# Patient Record
Sex: MALE | Race: WHITE | NOT HISPANIC OR LATINO | Employment: OTHER | URBAN - METROPOLITAN AREA
[De-identification: names, ages, dates, MRNs, and addresses within clinical notes are randomized per-mention and may not be internally consistent; named-entity substitution may affect disease eponyms.]

---

## 2018-05-17 ENCOUNTER — HOSPITAL ENCOUNTER (INPATIENT)
Facility: HOSPITAL | Age: 73
LOS: 13 days | Discharge: HOME HEALTH | DRG: 871 | End: 2018-05-30
Attending: EMERGENCY MEDICINE | Admitting: INTERNAL MEDICINE
Payer: MEDICARE

## 2018-05-17 DIAGNOSIS — G47.33 OBSTRUCTIVE SLEEP APNEA: ICD-10-CM

## 2018-05-17 DIAGNOSIS — R50.9 FEVER: ICD-10-CM

## 2018-05-17 DIAGNOSIS — R65.20 SEVERE SEPSIS: ICD-10-CM

## 2018-05-17 DIAGNOSIS — Z78.9 ALCOHOL USE: ICD-10-CM

## 2018-05-17 DIAGNOSIS — N17.9 AKI (ACUTE KIDNEY INJURY): ICD-10-CM

## 2018-05-17 DIAGNOSIS — I25.10 CORONARY ARTERY DISEASE, ANGINA PRESENCE UNSPECIFIED, UNSPECIFIED VESSEL OR LESION TYPE, UNSPECIFIED WHETHER NATIVE OR TRANSPLANTED HEART: ICD-10-CM

## 2018-05-17 DIAGNOSIS — J44.0 COPD WITH ACUTE LOWER RESPIRATORY INFECTION: ICD-10-CM

## 2018-05-17 DIAGNOSIS — E11.65 TYPE 2 DIABETES MELLITUS WITH HYPERGLYCEMIA, UNSPECIFIED WHETHER LONG TERM INSULIN USE: ICD-10-CM

## 2018-05-17 DIAGNOSIS — J18.9 COMMUNITY ACQUIRED PNEUMONIA OF LEFT LOWER LOBE OF LUNG: Primary | ICD-10-CM

## 2018-05-17 DIAGNOSIS — J96.01 ACUTE HYPOXEMIC RESPIRATORY FAILURE: ICD-10-CM

## 2018-05-17 DIAGNOSIS — J96.01 ACUTE RESPIRATORY FAILURE WITH HYPOXEMIA: ICD-10-CM

## 2018-05-17 DIAGNOSIS — I48.91 A-FIB: ICD-10-CM

## 2018-05-17 DIAGNOSIS — I10 HYPERTENSION, UNSPECIFIED TYPE: ICD-10-CM

## 2018-05-17 DIAGNOSIS — A41.9 SEVERE SEPSIS: ICD-10-CM

## 2018-05-17 DIAGNOSIS — I48.0 PAROXYSMAL ATRIAL FIBRILLATION: ICD-10-CM

## 2018-05-17 LAB
ALBUMIN SERPL BCP-MCNC: 3.9 G/DL
ALLENS TEST: ABNORMAL
ALP SERPL-CCNC: 42 U/L
ALT SERPL W/O P-5'-P-CCNC: 14 U/L
AMMONIA PLAS-SCNC: 22 UMOL/L
ANION GAP SERPL CALC-SCNC: 12 MMOL/L
AST SERPL-CCNC: 11 U/L
BACTERIA #/AREA URNS HPF: ABNORMAL /HPF
BASOPHILS # BLD AUTO: 0 K/UL
BASOPHILS NFR BLD: 0.1 %
BILIRUB SERPL-MCNC: 0.4 MG/DL
BILIRUB UR QL STRIP: NEGATIVE
BUN SERPL-MCNC: 16 MG/DL
CALCIUM SERPL-MCNC: 9.4 MG/DL
CHLORIDE SERPL-SCNC: 97 MMOL/L
CLARITY UR: CLEAR
CO2 SERPL-SCNC: 23 MMOL/L
COLOR UR: YELLOW
CREAT SERPL-MCNC: 1.4 MG/DL
DIFFERENTIAL METHOD: ABNORMAL
EOSINOPHIL # BLD AUTO: 0 K/UL
EOSINOPHIL NFR BLD: 0 %
ERYTHROCYTE [DISTWIDTH] IN BLOOD BY AUTOMATED COUNT: 14.7 %
EST. GFR  (AFRICAN AMERICAN): 58 ML/MIN/1.73 M^2
EST. GFR  (NON AFRICAN AMERICAN): 51 ML/MIN/1.73 M^2
ETHANOL SERPL-MCNC: <10 MG/DL
GLUCOSE SERPL-MCNC: 196 MG/DL
GLUCOSE UR QL STRIP: ABNORMAL
HCO3 UR-SCNC: 25 MMOL/L (ref 24–28)
HCT VFR BLD AUTO: 35.8 %
HGB BLD-MCNC: 12.2 G/DL
HGB UR QL STRIP: ABNORMAL
HYALINE CASTS #/AREA URNS LPF: 0 /LPF
KETONES UR QL STRIP: NEGATIVE
LACTATE SERPL-SCNC: 2.1 MMOL/L
LACTATE SERPL-SCNC: 2.1 MMOL/L
LACTATE SERPL-SCNC: 2.4 MMOL/L
LEUKOCYTE ESTERASE UR QL STRIP: NEGATIVE
LYMPHOCYTES # BLD AUTO: 0.3 K/UL
LYMPHOCYTES NFR BLD: 4.5 %
MCH RBC QN AUTO: 33.6 PG
MCHC RBC AUTO-ENTMCNC: 34 G/DL
MCV RBC AUTO: 99 FL
MICROSCOPIC COMMENT: ABNORMAL
MONOCYTES # BLD AUTO: 0.5 K/UL
MONOCYTES NFR BLD: 6.5 %
NEUTROPHILS # BLD AUTO: 6.9 K/UL
NEUTROPHILS NFR BLD: 88.9 %
NITRITE UR QL STRIP: NEGATIVE
PCO2 BLDA: 38.8 MMHG (ref 35–45)
PH SMN: 7.42 [PH] (ref 7.35–7.45)
PH UR STRIP: 6 [PH] (ref 5–8)
PLATELET # BLD AUTO: 104 K/UL
PMV BLD AUTO: 9 FL
PO2 BLDA: 29 MMHG (ref 40–60)
POC BE: 0 MMOL/L
POC SATURATED O2: 56 % (ref 95–100)
POC TCO2: 26 MMOL/L (ref 24–29)
POTASSIUM SERPL-SCNC: 4 MMOL/L
PROT SERPL-MCNC: 7.6 G/DL
PROT UR QL STRIP: ABNORMAL
RBC # BLD AUTO: 3.62 M/UL
RBC #/AREA URNS HPF: 8 /HPF (ref 0–4)
SAMPLE: ABNORMAL
SITE: ABNORMAL
SODIUM SERPL-SCNC: 132 MMOL/L
SP GR UR STRIP: 1.02 (ref 1–1.03)
SQUAMOUS #/AREA URNS HPF: 1 /HPF
URN SPEC COLLECT METH UR: ABNORMAL
UROBILINOGEN UR STRIP-ACNC: NEGATIVE EU/DL
WBC # BLD AUTO: 7.8 K/UL
WBC #/AREA URNS HPF: 4 /HPF (ref 0–5)
YEAST URNS QL MICRO: ABNORMAL

## 2018-05-17 PROCEDURE — 99223 1ST HOSP IP/OBS HIGH 75: CPT | Mod: AI,,, | Performed by: INTERNAL MEDICINE

## 2018-05-17 PROCEDURE — 96361 HYDRATE IV INFUSION ADD-ON: CPT

## 2018-05-17 PROCEDURE — 80320 DRUG SCREEN QUANTALCOHOLS: CPT

## 2018-05-17 PROCEDURE — 99285 EMERGENCY DEPT VISIT HI MDM: CPT | Mod: 25

## 2018-05-17 PROCEDURE — 36415 COLL VENOUS BLD VENIPUNCTURE: CPT

## 2018-05-17 PROCEDURE — 84145 PROCALCITONIN (PCT): CPT

## 2018-05-17 PROCEDURE — 83605 ASSAY OF LACTIC ACID: CPT

## 2018-05-17 PROCEDURE — 87186 SC STD MICRODIL/AGAR DIL: CPT

## 2018-05-17 PROCEDURE — 63600175 PHARM REV CODE 636 W HCPCS: Performed by: EMERGENCY MEDICINE

## 2018-05-17 PROCEDURE — 81000 URINALYSIS NONAUTO W/SCOPE: CPT

## 2018-05-17 PROCEDURE — 25000003 PHARM REV CODE 250: Performed by: EMERGENCY MEDICINE

## 2018-05-17 PROCEDURE — 27000221 HC OXYGEN, UP TO 24 HOURS

## 2018-05-17 PROCEDURE — 85025 COMPLETE CBC W/AUTO DIFF WBC: CPT

## 2018-05-17 PROCEDURE — 80053 COMPREHEN METABOLIC PANEL: CPT

## 2018-05-17 PROCEDURE — 87077 CULTURE AEROBIC IDENTIFY: CPT

## 2018-05-17 PROCEDURE — 82803 BLOOD GASES ANY COMBINATION: CPT

## 2018-05-17 PROCEDURE — 82140 ASSAY OF AMMONIA: CPT

## 2018-05-17 PROCEDURE — 12000002 HC ACUTE/MED SURGE SEMI-PRIVATE ROOM

## 2018-05-17 PROCEDURE — 99900035 HC TECH TIME PER 15 MIN (STAT)

## 2018-05-17 PROCEDURE — 87040 BLOOD CULTURE FOR BACTERIA: CPT | Mod: 59

## 2018-05-17 PROCEDURE — 83605 ASSAY OF LACTIC ACID: CPT | Mod: 91

## 2018-05-17 PROCEDURE — 94760 N-INVAS EAR/PLS OXIMETRY 1: CPT

## 2018-05-17 PROCEDURE — 96374 THER/PROPH/DIAG INJ IV PUSH: CPT

## 2018-05-17 RX ORDER — IBUPROFEN 200 MG
16 TABLET ORAL
Status: DISCONTINUED | OUTPATIENT
Start: 2018-05-17 | End: 2018-05-30 | Stop reason: HOSPADM

## 2018-05-17 RX ORDER — BENAZEPRIL HYDROCHLORIDE 10 MG/1
40 TABLET ORAL DAILY
Status: DISCONTINUED | OUTPATIENT
Start: 2018-05-18 | End: 2018-05-30 | Stop reason: HOSPADM

## 2018-05-17 RX ORDER — MONTELUKAST SODIUM 10 MG/1
10 TABLET ORAL NIGHTLY
Status: DISCONTINUED | OUTPATIENT
Start: 2018-05-17 | End: 2018-05-30 | Stop reason: HOSPADM

## 2018-05-17 RX ORDER — AMLODIPINE BESYLATE 5 MG/1
10 TABLET ORAL DAILY
Status: DISCONTINUED | OUTPATIENT
Start: 2018-05-18 | End: 2018-05-30 | Stop reason: HOSPADM

## 2018-05-17 RX ORDER — FUROSEMIDE 40 MG/1
40 TABLET ORAL DAILY
COMMUNITY

## 2018-05-17 RX ORDER — METOPROLOL SUCCINATE 25 MG/1
25 TABLET, EXTENDED RELEASE ORAL DAILY
Status: DISCONTINUED | OUTPATIENT
Start: 2018-05-18 | End: 2018-05-30 | Stop reason: HOSPADM

## 2018-05-17 RX ORDER — CEFTRIAXONE 1 G/50ML
1 INJECTION, SOLUTION INTRAVENOUS
Status: COMPLETED | OUTPATIENT
Start: 2018-05-17 | End: 2018-05-17

## 2018-05-17 RX ORDER — ATORVASTATIN CALCIUM 40 MG/1
40 TABLET, FILM COATED ORAL DAILY
Status: ON HOLD | COMMUNITY
End: 2018-05-30

## 2018-05-17 RX ORDER — GABAPENTIN 300 MG/1
300 CAPSULE ORAL 3 TIMES DAILY
Status: DISCONTINUED | OUTPATIENT
Start: 2018-05-18 | End: 2018-05-30 | Stop reason: HOSPADM

## 2018-05-17 RX ORDER — LORAZEPAM 2 MG/ML
2 INJECTION INTRAMUSCULAR
Status: DISCONTINUED | OUTPATIENT
Start: 2018-05-17 | End: 2018-05-30 | Stop reason: HOSPADM

## 2018-05-17 RX ORDER — INSULIN ASPART 100 [IU]/ML
0-5 INJECTION, SOLUTION INTRAVENOUS; SUBCUTANEOUS
Status: DISCONTINUED | OUTPATIENT
Start: 2018-05-17 | End: 2018-05-30 | Stop reason: HOSPADM

## 2018-05-17 RX ORDER — IBUPROFEN 400 MG/1
400 TABLET ORAL
Status: COMPLETED | OUTPATIENT
Start: 2018-05-17 | End: 2018-05-17

## 2018-05-17 RX ORDER — ATORVASTATIN CALCIUM 40 MG/1
40 TABLET, FILM COATED ORAL DAILY
Status: DISCONTINUED | OUTPATIENT
Start: 2018-05-18 | End: 2018-05-28

## 2018-05-17 RX ORDER — GLUCAGON 1 MG
1 KIT INJECTION
Status: DISCONTINUED | OUTPATIENT
Start: 2018-05-17 | End: 2018-05-30 | Stop reason: HOSPADM

## 2018-05-17 RX ORDER — ENOXAPARIN SODIUM 100 MG/ML
40 INJECTION SUBCUTANEOUS EVERY 24 HOURS
Status: DISCONTINUED | OUTPATIENT
Start: 2018-05-17 | End: 2018-05-21

## 2018-05-17 RX ORDER — AMIODARONE HYDROCHLORIDE 200 MG/1
200 TABLET ORAL DAILY
Status: DISCONTINUED | OUTPATIENT
Start: 2018-05-18 | End: 2018-05-30 | Stop reason: HOSPADM

## 2018-05-17 RX ORDER — ASPIRIN 325 MG
162 TABLET ORAL DAILY
COMMUNITY
End: 2019-03-14 | Stop reason: ALTCHOICE

## 2018-05-17 RX ORDER — THIAMINE HCL 100 MG
100 TABLET ORAL DAILY
Status: DISCONTINUED | OUTPATIENT
Start: 2018-05-18 | End: 2018-05-30 | Stop reason: HOSPADM

## 2018-05-17 RX ORDER — CEFTRIAXONE 2 G/50ML
2 INJECTION, SOLUTION INTRAVENOUS
Status: COMPLETED | OUTPATIENT
Start: 2018-05-18 | End: 2018-05-18

## 2018-05-17 RX ORDER — FUROSEMIDE 40 MG/1
40 TABLET ORAL DAILY
Status: DISCONTINUED | OUTPATIENT
Start: 2018-05-18 | End: 2018-05-18

## 2018-05-17 RX ORDER — METOPROLOL SUCCINATE 25 MG/1
25 TABLET, EXTENDED RELEASE ORAL DAILY
COMMUNITY

## 2018-05-17 RX ORDER — MONTELUKAST SODIUM 10 MG/1
10 TABLET ORAL NIGHTLY
COMMUNITY

## 2018-05-17 RX ORDER — TAMSULOSIN HYDROCHLORIDE 0.4 MG/1
0.4 CAPSULE ORAL DAILY
Status: DISCONTINUED | OUTPATIENT
Start: 2018-05-18 | End: 2018-05-30 | Stop reason: HOSPADM

## 2018-05-17 RX ORDER — IBUPROFEN 200 MG
24 TABLET ORAL
Status: DISCONTINUED | OUTPATIENT
Start: 2018-05-17 | End: 2018-05-30 | Stop reason: HOSPADM

## 2018-05-17 RX ORDER — IPRATROPIUM BROMIDE AND ALBUTEROL SULFATE 2.5; .5 MG/3ML; MG/3ML
3 SOLUTION RESPIRATORY (INHALATION) EVERY 4 HOURS
Status: DISCONTINUED | OUTPATIENT
Start: 2018-05-18 | End: 2018-05-30 | Stop reason: HOSPADM

## 2018-05-17 RX ORDER — GABAPENTIN 300 MG/1
300 CAPSULE ORAL 3 TIMES DAILY
COMMUNITY
End: 2019-01-23

## 2018-05-17 RX ORDER — SODIUM CHLORIDE 9 MG/ML
1000 INJECTION, SOLUTION INTRAVENOUS
Status: COMPLETED | OUTPATIENT
Start: 2018-05-17 | End: 2018-05-17

## 2018-05-17 RX ORDER — ACETAMINOPHEN 325 MG/1
650 TABLET ORAL EVERY 6 HOURS PRN
Status: DISCONTINUED | OUTPATIENT
Start: 2018-05-17 | End: 2018-05-30 | Stop reason: HOSPADM

## 2018-05-17 RX ORDER — ASPIRIN 325 MG
325 TABLET ORAL DAILY
Status: DISCONTINUED | OUTPATIENT
Start: 2018-05-18 | End: 2018-05-30 | Stop reason: HOSPADM

## 2018-05-17 RX ORDER — ONDANSETRON 2 MG/ML
4 INJECTION INTRAMUSCULAR; INTRAVENOUS EVERY 8 HOURS PRN
Status: DISCONTINUED | OUTPATIENT
Start: 2018-05-17 | End: 2018-05-30 | Stop reason: HOSPADM

## 2018-05-17 RX ORDER — PANTOPRAZOLE SODIUM 40 MG/1
40 TABLET, DELAYED RELEASE ORAL DAILY
Status: DISCONTINUED | OUTPATIENT
Start: 2018-05-18 | End: 2018-05-30 | Stop reason: HOSPADM

## 2018-05-17 RX ORDER — IBUPROFEN 200 MG
1 TABLET ORAL DAILY
Status: DISCONTINUED | OUTPATIENT
Start: 2018-05-18 | End: 2018-05-30 | Stop reason: HOSPADM

## 2018-05-17 RX ADMIN — CEFTRIAXONE 1 G: 1 INJECTION, SOLUTION INTRAVENOUS at 09:05

## 2018-05-17 RX ADMIN — AZITHROMYCIN MONOHYDRATE 500 MG: 500 INJECTION, POWDER, LYOPHILIZED, FOR SOLUTION INTRAVENOUS at 10:05

## 2018-05-17 RX ADMIN — SODIUM CHLORIDE 1000 ML: 0.9 INJECTION, SOLUTION INTRAVENOUS at 07:05

## 2018-05-17 RX ADMIN — IBUPROFEN 400 MG: 400 TABLET ORAL at 07:05

## 2018-05-17 NOTE — LETTER
May 22, 2018               [facility]  [address]  [place]  [address]  Phone: [phone]  Fax: [phone] May 22, 2018     Patient: Jun Stuart   YOB: 1945       To Whom It May Concern:    Jun Stuart was admitted to the hospital on 5/17/2018  6:54 PM and is presently under hospitalist care. If you have any questions or concerns, please don't hesitate to call my office at [phone].      Sincerely,        Erik Granados MD  Department of Hospital Medicine

## 2018-05-18 PROBLEM — Z72.0 TOBACCO ABUSE: Status: ACTIVE | Noted: 2018-05-18

## 2018-05-18 PROBLEM — I48.0 PAROXYSMAL ATRIAL FIBRILLATION: Status: ACTIVE | Noted: 2018-05-17

## 2018-05-18 PROBLEM — N17.9 AKI (ACUTE KIDNEY INJURY): Status: ACTIVE | Noted: 2018-05-18

## 2018-05-18 PROBLEM — Z78.9 ALCOHOL USE: Status: ACTIVE | Noted: 2018-05-18

## 2018-05-18 LAB
ALLENS TEST: ABNORMAL
AMMONIA PLAS-SCNC: 33 UMOL/L
ANION GAP SERPL CALC-SCNC: 12 MMOL/L
BASOPHILS # BLD AUTO: 0 K/UL
BASOPHILS NFR BLD: 0.3 %
BUN SERPL-MCNC: 17 MG/DL
CALCIUM SERPL-MCNC: 8.8 MG/DL
CHLORIDE SERPL-SCNC: 100 MMOL/L
CO2 SERPL-SCNC: 21 MMOL/L
CREAT SERPL-MCNC: 1.2 MG/DL
DELSYS: ABNORMAL
DIFFERENTIAL METHOD: ABNORMAL
EOSINOPHIL # BLD AUTO: 0 K/UL
EOSINOPHIL NFR BLD: 0 %
ERYTHROCYTE [DISTWIDTH] IN BLOOD BY AUTOMATED COUNT: 14.8 %
EST. GFR  (AFRICAN AMERICAN): >60 ML/MIN/1.73 M^2
EST. GFR  (NON AFRICAN AMERICAN): >60 ML/MIN/1.73 M^2
FIO2: 40
FLOW: 5
GLUCOSE SERPL-MCNC: 143 MG/DL
HCO3 UR-SCNC: 22.5 MMOL/L (ref 24–28)
HCT VFR BLD AUTO: 32.8 %
HGB BLD-MCNC: 11.3 G/DL
LACTATE SERPL-SCNC: 1.4 MMOL/L
LACTATE SERPL-SCNC: 1.6 MMOL/L
LACTATE SERPL-SCNC: 1.6 MMOL/L
LACTATE SERPL-SCNC: 2.2 MMOL/L
LACTATE SERPL-SCNC: 2.6 MMOL/L
LYMPHOCYTES # BLD AUTO: 0.3 K/UL
LYMPHOCYTES NFR BLD: 4.5 %
MCH RBC QN AUTO: 34.1 PG
MCHC RBC AUTO-ENTMCNC: 34.4 G/DL
MCV RBC AUTO: 99 FL
MODE: ABNORMAL
MONOCYTES # BLD AUTO: 0.5 K/UL
MONOCYTES NFR BLD: 6.9 %
NEUTROPHILS # BLD AUTO: 6.3 K/UL
NEUTROPHILS NFR BLD: 88.3 %
PCO2 BLDA: 31.6 MMHG (ref 35–45)
PH SMN: 7.46 [PH] (ref 7.35–7.45)
PLATELET # BLD AUTO: 85 K/UL
PMV BLD AUTO: 8.8 FL
PO2 BLDA: 48 MMHG (ref 80–100)
POC BE: -1 MMOL/L
POC SATURATED O2: 87 % (ref 95–100)
POC TCO2: 23 MMOL/L (ref 23–27)
POCT GLUCOSE: 151 MG/DL (ref 70–110)
POCT GLUCOSE: 171 MG/DL (ref 70–110)
POCT GLUCOSE: 173 MG/DL (ref 70–110)
POTASSIUM SERPL-SCNC: 4.1 MMOL/L
PROCALCITONIN SERPL IA-MCNC: 0.78 NG/ML
RBC # BLD AUTO: 3.31 M/UL
SAMPLE: ABNORMAL
SITE: ABNORMAL
SODIUM SERPL-SCNC: 133 MMOL/L
WBC # BLD AUTO: 7.2 K/UL

## 2018-05-18 PROCEDURE — 27000221 HC OXYGEN, UP TO 24 HOURS

## 2018-05-18 PROCEDURE — 94640 AIRWAY INHALATION TREATMENT: CPT

## 2018-05-18 PROCEDURE — 25000003 PHARM REV CODE 250: Performed by: NURSE PRACTITIONER

## 2018-05-18 PROCEDURE — 83605 ASSAY OF LACTIC ACID: CPT | Mod: 91

## 2018-05-18 PROCEDURE — 20000000 HC ICU ROOM

## 2018-05-18 PROCEDURE — 25000242 PHARM REV CODE 250 ALT 637 W/ HCPCS: Performed by: NURSE PRACTITIONER

## 2018-05-18 PROCEDURE — 25000003 PHARM REV CODE 250: Performed by: EMERGENCY MEDICINE

## 2018-05-18 PROCEDURE — 25000003 PHARM REV CODE 250: Performed by: INTERNAL MEDICINE

## 2018-05-18 PROCEDURE — 99900035 HC TECH TIME PER 15 MIN (STAT)

## 2018-05-18 PROCEDURE — 63600175 PHARM REV CODE 636 W HCPCS: Performed by: INTERNAL MEDICINE

## 2018-05-18 PROCEDURE — 51702 INSERT TEMP BLADDER CATH: CPT

## 2018-05-18 PROCEDURE — 82140 ASSAY OF AMMONIA: CPT

## 2018-05-18 PROCEDURE — 82803 BLOOD GASES ANY COMBINATION: CPT

## 2018-05-18 PROCEDURE — 94761 N-INVAS EAR/PLS OXIMETRY MLT: CPT

## 2018-05-18 PROCEDURE — 36600 WITHDRAWAL OF ARTERIAL BLOOD: CPT

## 2018-05-18 PROCEDURE — 99233 SBSQ HOSP IP/OBS HIGH 50: CPT | Mod: ,,, | Performed by: INTERNAL MEDICINE

## 2018-05-18 PROCEDURE — S4991 NICOTINE PATCH NONLEGEND: HCPCS | Performed by: NURSE PRACTITIONER

## 2018-05-18 PROCEDURE — 63600175 PHARM REV CODE 636 W HCPCS: Performed by: NURSE PRACTITIONER

## 2018-05-18 PROCEDURE — 80048 BASIC METABOLIC PNL TOTAL CA: CPT

## 2018-05-18 PROCEDURE — 85025 COMPLETE CBC W/AUTO DIFF WBC: CPT

## 2018-05-18 PROCEDURE — 87086 URINE CULTURE/COLONY COUNT: CPT

## 2018-05-18 PROCEDURE — 63600175 PHARM REV CODE 636 W HCPCS: Performed by: EMERGENCY MEDICINE

## 2018-05-18 PROCEDURE — 36415 COLL VENOUS BLD VENIPUNCTURE: CPT

## 2018-05-18 RX ORDER — SODIUM CHLORIDE 9 MG/ML
INJECTION, SOLUTION INTRAVENOUS CONTINUOUS
Status: DISCONTINUED | OUTPATIENT
Start: 2018-05-18 | End: 2018-05-18

## 2018-05-18 RX ORDER — HYDRALAZINE HYDROCHLORIDE 20 MG/ML
10 INJECTION INTRAMUSCULAR; INTRAVENOUS EVERY 6 HOURS PRN
Status: DISCONTINUED | OUTPATIENT
Start: 2018-05-19 | End: 2018-05-30 | Stop reason: HOSPADM

## 2018-05-18 RX ORDER — LORAZEPAM 2 MG/ML
1 INJECTION INTRAMUSCULAR EVERY 4 HOURS PRN
Status: DISCONTINUED | OUTPATIENT
Start: 2018-05-18 | End: 2018-05-30 | Stop reason: HOSPADM

## 2018-05-18 RX ORDER — CHLORDIAZEPOXIDE HYDROCHLORIDE 10 MG/1
10 CAPSULE, GELATIN COATED ORAL EVERY 8 HOURS
Status: DISCONTINUED | OUTPATIENT
Start: 2018-05-18 | End: 2018-05-25

## 2018-05-18 RX ORDER — TRAMADOL HYDROCHLORIDE 50 MG/1
50 TABLET ORAL EVERY 8 HOURS PRN
Status: DISCONTINUED | OUTPATIENT
Start: 2018-05-18 | End: 2018-05-30 | Stop reason: HOSPADM

## 2018-05-18 RX ORDER — LORAZEPAM 2 MG/ML
INJECTION INTRAMUSCULAR
Status: DISPENSED
Start: 2018-05-18 | End: 2018-05-19

## 2018-05-18 RX ORDER — FUROSEMIDE 10 MG/ML
20 INJECTION INTRAMUSCULAR; INTRAVENOUS ONCE
Status: COMPLETED | OUTPATIENT
Start: 2018-05-18 | End: 2018-05-18

## 2018-05-18 RX ORDER — ACETAMINOPHEN 10 MG/ML
1000 INJECTION, SOLUTION INTRAVENOUS ONCE
Status: COMPLETED | OUTPATIENT
Start: 2018-05-18 | End: 2018-05-18

## 2018-05-18 RX ADMIN — TAMSULOSIN HYDROCHLORIDE 0.4 MG: 0.4 CAPSULE ORAL at 09:05

## 2018-05-18 RX ADMIN — LORAZEPAM 1 MG: 2 INJECTION, SOLUTION INTRAMUSCULAR; INTRAVENOUS at 04:05

## 2018-05-18 RX ADMIN — Medication 1 TABLET: at 09:05

## 2018-05-18 RX ADMIN — CEFTRIAXONE 2 G: 2 INJECTION, SOLUTION INTRAVENOUS at 09:05

## 2018-05-18 RX ADMIN — ACETAMINOPHEN 1000 MG: 10 INJECTION, SOLUTION INTRAVENOUS at 05:05

## 2018-05-18 RX ADMIN — CHLORDIAZEPOXIDE HYDROCHLORIDE 10 MG: 10 CAPSULE ORAL at 02:05

## 2018-05-18 RX ADMIN — ACETAMINOPHEN 650 MG: 325 TABLET, FILM COATED ORAL at 05:05

## 2018-05-18 RX ADMIN — HYDRALAZINE HYDROCHLORIDE: 20 INJECTION INTRAMUSCULAR; INTRAVENOUS at 11:05

## 2018-05-18 RX ADMIN — IPRATROPIUM BROMIDE AND ALBUTEROL SULFATE 3 ML: .5; 3 SOLUTION RESPIRATORY (INHALATION) at 07:05

## 2018-05-18 RX ADMIN — AMLODIPINE BESYLATE 10 MG: 5 TABLET ORAL at 09:05

## 2018-05-18 RX ADMIN — FUROSEMIDE 20 MG: 10 INJECTION, SOLUTION INTRAMUSCULAR; INTRAVENOUS at 05:05

## 2018-05-18 RX ADMIN — SODIUM CHLORIDE: 0.9 INJECTION, SOLUTION INTRAVENOUS at 12:05

## 2018-05-18 RX ADMIN — PIPERACILLIN SODIUM AND TAZOBACTAM SODIUM 4.5 G: 4; .5 INJECTION, POWDER, FOR SOLUTION INTRAVENOUS at 07:05

## 2018-05-18 RX ADMIN — IPRATROPIUM BROMIDE AND ALBUTEROL SULFATE 3 ML: .5; 3 SOLUTION RESPIRATORY (INHALATION) at 12:05

## 2018-05-18 RX ADMIN — IPRATROPIUM BROMIDE AND ALBUTEROL SULFATE 3 ML: .5; 3 SOLUTION RESPIRATORY (INHALATION) at 11:05

## 2018-05-18 RX ADMIN — ENOXAPARIN SODIUM 40 MG: 100 INJECTION SUBCUTANEOUS at 04:05

## 2018-05-18 RX ADMIN — GABAPENTIN 300 MG: 300 CAPSULE ORAL at 02:05

## 2018-05-18 RX ADMIN — SODIUM CHLORIDE 2000 MG: 9 INJECTION, SOLUTION INTRAVENOUS at 07:05

## 2018-05-18 RX ADMIN — ATORVASTATIN CALCIUM 40 MG: 40 TABLET, FILM COATED ORAL at 09:05

## 2018-05-18 RX ADMIN — NICOTINE 1 PATCH: 14 PATCH, EXTENDED RELEASE TRANSDERMAL at 09:05

## 2018-05-18 RX ADMIN — FUROSEMIDE 40 MG: 40 TABLET ORAL at 09:05

## 2018-05-18 RX ADMIN — CHLORDIAZEPOXIDE HYDROCHLORIDE 10 MG: 10 CAPSULE ORAL at 10:05

## 2018-05-18 RX ADMIN — AZITHROMYCIN MONOHYDRATE 500 MG: 500 INJECTION, POWDER, LYOPHILIZED, FOR SOLUTION INTRAVENOUS at 10:05

## 2018-05-18 RX ADMIN — IPRATROPIUM BROMIDE AND ALBUTEROL SULFATE 3 ML: .5; 3 SOLUTION RESPIRATORY (INHALATION) at 04:05

## 2018-05-18 RX ADMIN — AMIODARONE HYDROCHLORIDE 200 MG: 200 TABLET ORAL at 09:05

## 2018-05-18 RX ADMIN — PANTOPRAZOLE SODIUM 40 MG: 40 TABLET, DELAYED RELEASE ORAL at 09:05

## 2018-05-18 RX ADMIN — ASPIRIN 325 MG ORAL TABLET 325 MG: 325 PILL ORAL at 09:05

## 2018-05-18 RX ADMIN — ENOXAPARIN SODIUM 40 MG: 100 INJECTION SUBCUTANEOUS at 12:05

## 2018-05-18 RX ADMIN — MONTELUKAST SODIUM 10 MG: 10 TABLET, FILM COATED ORAL at 09:05

## 2018-05-18 RX ADMIN — TRAMADOL HYDROCHLORIDE 50 MG: 50 TABLET, FILM COATED ORAL at 02:05

## 2018-05-18 RX ADMIN — MONTELUKAST SODIUM 10 MG: 10 TABLET, FILM COATED ORAL at 12:05

## 2018-05-18 RX ADMIN — ACETAMINOPHEN 650 MG: 325 TABLET, FILM COATED ORAL at 11:05

## 2018-05-18 RX ADMIN — DEXMEDETOMIDINE HYDROCHLORIDE 0.2 MCG/KG/HR: 100 INJECTION, SOLUTION INTRAVENOUS at 05:05

## 2018-05-18 RX ADMIN — METOPROLOL SUCCINATE 25 MG: 25 TABLET, EXTENDED RELEASE ORAL at 09:05

## 2018-05-18 RX ADMIN — GABAPENTIN 300 MG: 300 CAPSULE ORAL at 09:05

## 2018-05-18 RX ADMIN — THIAMINE HCL TAB 100 MG 100 MG: 100 TAB at 09:05

## 2018-05-18 RX ADMIN — BENAZEPRIL HYDROCHLORIDE 40 MG: 10 TABLET, FILM COATED ORAL at 09:05

## 2018-05-18 RX ADMIN — IPRATROPIUM BROMIDE AND ALBUTEROL SULFATE 3 ML: .5; 3 SOLUTION RESPIRATORY (INHALATION) at 03:05

## 2018-05-18 NOTE — PROGRESS NOTES
Attempted to call pt's wife again w/ no answer. I called and spoke w/ pt's daughter Drake and updated her on what was going on and that he had been transferred to ICU. She stated she would go to ICU to see him and find out more information, as well as try and get in touch w/ his wife Isaura.    Renny Sanders RN

## 2018-05-18 NOTE — ASSESSMENT & PLAN NOTE
Chronic problem.  Labile.  Continue home antihypertensive regimen and monitor b/p closely.  Adjust medications as necessary.

## 2018-05-18 NOTE — PROGRESS NOTES
Pt restless, tries to get oob. Pt answers orientation questions appropriately, but appears confused. He was repositioned in bed and instructed to not get up w/o assistance. Bed alarm set. He is a daily drinker and hasn't had a drink since yesterday. He was started on tramadol and librium today. Will continue to monitor for further withdrawal symptoms.    Renny Sanders RN

## 2018-05-18 NOTE — ED PROVIDER NOTES
"Encounter Date: 5/17/2018    SCRIBE #1 NOTE: I, Burt Wallace, am scribing for, and in the presence of, Dr. Rouse .       History     Chief Complaint   Patient presents with    Altered Mental Status     confusion with fever and SOB; 3 drinks of Vodka     Time seen by provider: 7:11 PM on 05/17/2018    Chief Complaint: GERA Stuart is a 72 y.o. male who presents to the ED via EMS for further evaluation of confusion with an onset of this am. Wife states that this am the patient awoke, complaining of chest pain that radiated into his upper back. The patient was brought to [facility], where the wife states that patient had an "x-ray and EKG." She states that the patient was then given pain medication and discharged. She admits that since the patients discharge he has had a subjective fever, stating that "He felt hotter every time I felt him." Patient then drank 2 Toddies, became nausous and had 1 episode of emesis. Patient then started complaining of a diffuse headache. She denies the patient having diarrhea. Wife states that this is when the patient started to become SOB and confused. She reports that he then repeated questions for her, which he "knew the answers too." She admits that it has been worsening since onset this am. Patient then became more somnolent and harder to move per wife. She states that he had 1 episode of incontinence of urine, which is unusual for him. EMS states that the patient had an CBG of 193 and was 91% on RA on arrival. Patient has a PMHx of HTN, DM, colon polyp, and GERD. Records show a PSHx of joint replacement surgery, vasectomy, and cardioversion. Wife admits that the patient drinks alcohol daily.       The history is provided by the patient, the spouse and the EMS personnel.     Review of patient's allergies indicates:  No Known Allergies  Past Medical History:   Diagnosis Date    Atrial fibrillation     Colon polyp     Coronary artery disease     " Diabetes mellitus     GERD (gastroesophageal reflux disease)     Hyperlipidemia     Hypertension      Past Surgical History:   Procedure Laterality Date    cardioversion  2012    JOINT REPLACEMENT      VASECTOMY       Family History   Problem Relation Age of Onset    Urolithiasis Neg Hx     Sickle cell trait Neg Hx     Kidney cancer Neg Hx      Social History   Substance Use Topics    Smoking status: Current Every Day Smoker     Packs/day: 1.00     Years: 50.00     Types: Cigarettes    Smokeless tobacco: Never Used    Alcohol use Yes     Review of Systems   Constitutional: Positive for fever (subjective). Negative for activity change, appetite change, chills and fatigue.   Eyes: Negative for visual disturbance.   Respiratory: Positive for shortness of breath. Negative for apnea.    Cardiovascular: Positive for chest pain. Negative for palpitations.   Gastrointestinal: Positive for nausea and vomiting. Negative for abdominal distention, abdominal pain and diarrhea.   Genitourinary: Negative for difficulty urinating.   Musculoskeletal: Positive for back pain. Negative for neck pain.   Skin: Negative for pallor and rash.   Neurological: Positive for headaches. Negative for speech difficulty, weakness and numbness.   Hematological: Does not bruise/bleed easily.   Psychiatric/Behavioral: Negative for agitation.   All other systems reviewed and are negative.      Physical Exam     Initial Vitals [05/17/18 1849]   BP Pulse Resp Temp SpO2   (!) 183/75 92 (!) 24 (!) 103.1 °F (39.5 °C) (!) 92 %      MAP       111         Physical Exam    Nursing note and vitals reviewed.  Constitutional: He appears well-developed and well-nourished.   HENT:   Head: Normocephalic and atraumatic.   Eyes: Conjunctivae are normal.   Neck: Normal range of motion. Neck supple.   Cardiovascular: Normal rate, regular rhythm and normal heart sounds. Exam reveals no gallop and no friction rub.    No murmur heard.  Pulmonary/Chest: No  respiratory distress. He has no wheezes. He has no rhonchi. He has no rales.   Decreased air movement    Abdominal: Soft. He exhibits no distension. There is no hepatomegaly. There is no tenderness.   Musculoskeletal: Normal range of motion.   Neurological: He is alert and oriented to person, place, and time.   Skin: Skin is warm and dry.   Psychiatric: He has a normal mood and affect.         ED Course   Procedures  Labs Reviewed   CULTURE, BLOOD   CULTURE, BLOOD   AMMONIA   CBC W/ AUTO DIFFERENTIAL   COMPREHENSIVE METABOLIC PANEL   URINALYSIS   ALCOHOL,MEDICAL (ETHANOL)   LACTIC ACID, PLASMA        Imaging Results    None            Medical Decision Making:   History:   Old Medical Records: I decided to obtain old medical records.  Clinical Tests:   Lab Tests: Ordered and Reviewed  Radiological Study: Ordered and Reviewed       APC / Resident Notes:   I, Dr. Coleman Rouse III, personally performed the services described in this documentation. All medical record entries made by the scribe were at my direction and in my presence.  I have reviewed the chart and agree that the record reflects my personal performance and is accurate and complete. Coleman Rouse III, MD.  9:05 PM 05/17/2018       Scribe Attestation:   Scribe #1: I performed the above scribed service and the documentation accurately describes the services I performed. I attest to the accuracy of the note.               Clinical Impression:   The encounter diagnosis was Fever.                           Coleman Rouse III, MD  05/17/18 9143

## 2018-05-18 NOTE — ASSESSMENT & PLAN NOTE
Reportedly drinks etoh daily without history of DT's  Nursing to perform CIWA assessment, monitoring closesly for DT's (tachycardia, diaphoresis, hypertension, and aggitation)  Will utilize multivitamins  Educated on the deleterious effects of alcohol use  Seizure precautions with prn ativan   Fall precautions

## 2018-05-18 NOTE — ASSESSMENT & PLAN NOTE
S/P cardioversion.  Has been in NSR since.  Not on anticoagulant therapy.  Continue home BB, Amiodarone.  Cardiac monitoring.

## 2018-05-18 NOTE — PROGRESS NOTES
05/18/18 0740   Patient Assessment/Suction   Level of Consciousness (AVPU) alert   Respiratory Effort Normal;Unlabored   All Lung Fields Breath Sounds coarse   Cough Frequency infrequent   Cough Type good;nonproductive   PRE-TX-O2-ETCO2   O2 Device (Oxygen Therapy) nasal cannula   $ Is the patient on Low Flow Oxygen? Yes   Flow (L/min) 3  (titrated to 2)   Oxygen Concentration (%) 32   SpO2 98 %   Pulse Oximetry Type Intermittent   $ Pulse Oximetry - Multiple Charge Pulse Oximetry - Multiple   Pulse 64   Resp 18   Aerosol Therapy   $ Aerosol Therapy Charges Aerosol Treatment   Respiratory Treatment Status given   SVN/Inhaler Treatment Route mask   Position During Treatment Sitting on edge of bed (dangling)   Patient Tolerance good   Post-Treatment   Post-treatment Heart Rate (beats/min) 67   Post-treatment Resp Rate (breaths/min) 18   All Fields Breath Sounds unchanged   Duoneb Q4, NC titrated to 2L, vitals as charted, tolerated tx well.

## 2018-05-18 NOTE — ASSESSMENT & PLAN NOTE
IV Azithromycin and Rocephin  Bronchodilator treatments q4h  Antipyretics prn  Supplemental O2

## 2018-05-18 NOTE — PROGRESS NOTES
Found pt in mild distress due to trying to get out of bed while side rail was up, had become pinned TRINI Rocha and TRINI Lawson and myself got him back to bed, pts SpO2 was at 88 but quickly came up to 93 once O2 was increased and breathing tx started. Pt appears to either be having reaction to medication or withdrawals.

## 2018-05-18 NOTE — ASSESSMENT & PLAN NOTE
Dangers of cigarette smoking were reviewed with patient in detail for 3 minutes and patient was encouraged to quit. Nicotine replacement options were discussed - option to use Nicoderm.

## 2018-05-18 NOTE — CONSULTS
Date: 5/18/2018   Jun Stuart [id number] is a 72 y.o. male who has been consulted for vancomycin dosing.    The patient has the following labs:     Creatinine (mg/dl)    WBC Count   Serum creatinine: 1.2 mg/dL 05/18/18 0605  Estimated creatinine clearance: 81.3 mL/min Lab Results   Component Value Date    WBC 7.20 05/18/2018        Current weight is 134.6 kg (296 lb 11.8 oz)    Pt being treated with vancomycin for bacteremia, pneumonia, sepsis.    The patient will be started on vancomycin at a dose of 2000 mg every 12 hours (15 mg/kg/dose). The vancomycin trough has been ordered for 5/20/18 at 0715.  Target goal is 15-20.     Patient will be followed by pharmacy for changes in renal function, toxicity, and efficacy.      Thank you for allowing us to participate in this patient's care.     Qamar Morton, Pharmacist

## 2018-05-18 NOTE — PROGRESS NOTES
Pt's temp elevated, O2 sat low. O2 increased. Dr. Granados called and made aware of pt's current condition, new orders received. Pt transferred to ICU via bed, report given to Ludy FELIZ.    Renny Sanders RN

## 2018-05-18 NOTE — PLAN OF CARE
"I met with the pt and his SO, Ms. Delarosa at bedside. The pt lives with his SO and arrived from home. He denies HH or DME. Pharmacy of choice is [facility] [address] in [place]. The pt correct his PCP to Dr. Morales at the [facility]. Verified insurance as Medicare and [employer]. The pt stated that he," wants to go home today." I told him that I would update Dr. Granados. I educated the pt on the blue discharge folder and wrote my name and number on the pts white board.  Monica Vaughn [facility]     05/18/18 0929   Discharge Assessment   Assessment Type Discharge Planning Assessment   Confirmed/corrected address and phone number on facesheet? Yes   Assessment information obtained from? Patient;Caregiver   Communicated expected length of stay with patient/caregiver no   Prior to hospitilization cognitive status: Alert/Oriented   Prior to hospitalization functional status: Independent   Current cognitive status: Alert/Oriented   Current Functional Status: Independent   Lives With significant other   Able to Return to Prior Arrangements yes   Is patient able to care for self after discharge? Yes   Readmission Within The Last 30 Days no previous admission in last 30 days   Patient currently being followed by outpatient case management? No   Patient currently receives any other outside agency services? No   Equipment Currently Used at Home none   Do you have any problems affording any of your prescribed medications? No   Is the patient taking medications as prescribed? yes   Does the patient have transportation home? Yes   Transportation Available family or friend will provide   Does the patient receive services at the [facility]? No   Discharge Plan A Home   Discharge Plan B Home with family   Patient/Family In Agreement With Plan yes     "

## 2018-05-18 NOTE — PROGRESS NOTES
"Progress Note  Hospital Medicine  Patient Name:Jun Stuart  MRN:  [id number]  Patient Class: IP- Inpatient  Admit Date: 5/17/2018  Length of Stay: 1 days  Expected Discharge Date:   Attending Physician: Erik Granados MD  Primary Care Provider:  Juanjo Park MD    SUBJECTIVE:     Principal Problem: Acute hypoxemic respiratory failure  Initial history of present illness: Jun Sutart is a 72 y.o. Male with PMHx significant for afib, CAD, GERD, HTN, DM and HLD.  He is admitted to the service of hospital medicine with severe sepsis and acute hypoxemic respiratory failure. He presented to the ED via EMS for further evaluation of confusion.  His wife stated that  this am the patient awoke, he began complaining of upper back pain. He was brought to [facility], where the wife states that patient had an "x-ray and EKG." She states that the patient was then given pain medication and discharged. The patient took the pain medication and then drank alcohol.  He became nausous and had 1 episode of emesis.  Wife stated he went to bed and when he tried to get up to go to the bathroom, he couldn't, so she called EMS.  She reported that he had been confused after the pain medication and alcohol.  She stated that he had chills while at [facility], but did not take his temperature.  She endorsed the patient has had and increase in cough that is nonproductive. EMS states that the patient had an CBG of 193 and was 91% on RA on arrival.    PMH/PSH/SH/FH/Meds: reviewed.    Symptoms/Review of Systems: Anxious and tremulous. Wanting to go home. Family members confirm heavy alcohol use on regular basis.  No cough, chest pain or headache, fever or abdominal pain.     Diet:  Adequate intake.    Activity level: Normal.    Pain:  Back pain    OBJECTIVE:   Vital Signs (Most Recent):      Temp: 99.2 °F (37.3 °C) (05/18/18 0725)  Pulse: 64 (05/18/18 0740)  Resp: 18 (05/18/18 0740)  BP: 137/62 (05/18/18 0725)  SpO2: 98 % " (05/18/18 0740)       Vital Signs Range (Last 24H):  Temp:  [98.1 °F (36.7 °C)-103.2 °F (39.6 °C)]   Pulse:  [18-92]   Resp:  [16-24]   BP: (120-189)/(56-95)   SpO2:  [92 %-98 %]     Weight: 130 kg (286 lb 9.6 oz)  Body mass index is 38.87 kg/m².    Intake/Output Summary (Last 24 hours) at 05/18/18 1011  Last data filed at 05/18/18 0806   Gross per 24 hour   Intake           616.67 ml   Output              850 ml   Net          -233.33 ml     Physical Examination:  Constitutional: He is oriented to person, place, and time. He appears well-developed and well-nourished. No distress.   Obese   HENT:   Head: Normocephalic and atraumatic.   Mouth/Throat: Oropharynx is clear and moist.   Eyes: Conjunctivae and EOM are normal. Pupils are equal, round, and reactive to light. No scleral icterus.   Neck: Normal range of motion. Neck supple. No JVD present. No tracheal deviation present. No thyromegaly present.   Cardiovascular: Normal rate, regular rhythm, normal heart sounds and intact distal pulses.  Exam reveals no gallop and no friction rub.    No murmur heard.  Pulses:       Dorsalis pedis pulses are 2+ on the right side, and 2+ on the left side.   Pulmonary/Chest: No accessory muscle usage. No respiratory distress. He has wheezes. He has rhonchi. He has no rales.   Abdominal: Soft. Right CVA tenderness. He exhibits no distension and no mass. There is no tenderness. There is no guarding.   Pendulous   Musculoskeletal: Normal range of motion. He exhibits no edema, tenderness or deformity.   Neurological: He is alert and oriented to person, place, and time. He has normal strength. He exhibits normal muscle tone. Coordination normal.   Skin: Skin is warm and intact. Capillary refill takes less than 2 seconds. No rash noted. He is diaphoretic. No erythema.   Psychiatric: He has a normal mood and affect. His speech is normal and behavior is normal. Judgment and thought content normal.   CRANIAL NERVES   CN III, IV, VI    Pupils are equal, round, and reactive to light.  Extraocular motions are normal.     CBC:    Recent Labs  Lab 05/17/18 1941 05/18/18  0605   WBC 7.80 7.20   RBC 3.62* 3.31*   HGB 12.2* 11.3*   HCT 35.8* 32.8*   * 85*   MCV 99* 99*   MCH 33.6* 34.1*   MCHC 34.0 34.4   BMP    Recent Labs  Lab 05/17/18 1941 05/18/18  0605   * 143*   * 133*   K 4.0 4.1   CL 97 100   CO2 23 21*   BUN 16 17   CREATININE 1.4 1.2   CALCIUM 9.4 8.8      Diagnostic Results:  Microbiology Results (last 7 days)     Procedure Component Value Units Date/Time    Blood culture [[id number]] Collected:  05/17/18 1940    Order Status:  Completed Specimen:  Blood from Antecubital, Right  Arm Updated:  05/18/18 0745     Blood Culture, Routine No Growth to date    Blood culture [[id number]] Collected:  05/17/18 1945    Order Status:  Completed Specimen:  Blood Updated:  05/18/18 0745     Blood Culture, Routine No Growth to date    Culture, Respiratory with Gram Stain [[id number]]     Order Status:  No result Specimen:  Respiratory          CXR: Mildly increased interstitial markings diffusely in each lung.  Differential considerations include mild CHF (noting cardiomegaly) and an acute bronchiolitis.    Assessment/Plan:     * Acute hypoxemic respiratory failure      Community acquired pneumonia of left lower lobe of lung     IV Azithromycin and Rocephin  Bronchodilator treatments q4h  Antipyretics prn  Supplemental O2 via nasal cannula         Severe sepsis     This patient does have evidence of infective focus  My overall impression is severe sepsis.  Source - pneumonia  Antibiotics given-              Antibiotics      Start     Stop Route Frequency Ordered     05/18/18 2200   azithromycin 500 mg in dextrose 5 % 250 mL IVPB (ready to mix system)      05/19 0959 IV ED 1 Time 05/17/18 2248 05/18/18 2100   cefTRIAXone 2 gram/50 mL IVPB 2 g      05/19 0859 IV ED 1 Time 05/17/18 2248       Severe Sepsis only-  Latest labs reviewed,  they are-     Recent Labs  Lab 05/17/18 1941   BILITOT 0.4     Recent Labs  Lab 05/17/18 1941   LACTATE 2.4*      Recent Labs  Lab 05/17/18 1941   PH 7.417   PO2 29*   PCO2 38.8   HCO3 25.0   BE 0     Organ dysfunction indicated by Encephalopathy and Acute respiratory failure and ZACHARY  Trend lactate until normalized.  Follow blood, urine and sputum cultures.  Check procalcitonin level.         Type 2 diabetes mellitus with hyperglycemia     Chronic problem.  Uncontrolled hyperglycemia upon admission labs.    Check blood glucose level q AC/HS.  Use Novolog Insulin Sliding Scale as needed.   Continue American Diabetic Association 1800 Kcal diet.           UTI  Follow urine C/S. Continue iV antibiotics.             ZACHARY (acute kidney injury)     Review of prior lab work at [facility] on 5/8/18 - Cr was 1.07, now 1.4 on admission.  Likely 2/2 to sepsis.  Aggressive IVF hydration.  Follow renal panel and electrolytes closely.  Adjust renal dose medications for Estimated Creatinine Clearance: 66.9 mL/min (based on SCr of 1.4 mg/dL).  Avoid nonessential nephrotoxic agents.       Tobacco abuse     Dangers of cigarette smoking were reviewed with patient in detail for 3 minutes and patient was encouraged to quit. Nicotine replacement options were discussed - option to use Nicoderm.       Alcohol use     Reportedly drinks etoh daily without history of DT's  Nursing to perform CIWA assessment, monitoring closesly for DT's (tachycardia, diaphoresis, hypertension, and aggitation)  Will utilize multivitamins  Educated on the deleterious effects of alcohol use  Seizure precautions with prn ativan   Fall precautions       HTN (hypertension)     Chronic problem.  Labile.  Continue home antihypertensive regimen and monitor b/p closely.  Adjust medications as necessary.       CAD (coronary artery disease)     Historical diagnosis with stent placement.  Did not present with s/s of ACS.  Will continue home BB, ASA, and Statin therapy.  Cardiac  monitoring  Monitor closely for s/s of ACS.       Paroxysmal atrial fibrillation     S/P cardioversion.  Has been in NSR since.  Not on anticoagulant therapy.  Continue home BB, Amiodarone.  Cardiac monitoring.     Discussed with multiple family members.     VTE Risk Mitigation         Ordered     enoxaparin injection 40 mg  Daily      05/17/18 2248     IP VTE LOW RISK PATIENT  Once      05/17/18 2248        Erik Granados MD  Department of Hospital Medicine   [facility]

## 2018-05-18 NOTE — SUBJECTIVE & OBJECTIVE
Past Medical History:   Diagnosis Date    Atrial fibrillation     Colon polyp     Coronary artery disease     Diabetes mellitus     GERD (gastroesophageal reflux disease)     Hyperlipidemia     Hypertension        Past Surgical History:   Procedure Laterality Date    cardioversion  2012    JOINT REPLACEMENT      VASECTOMY         Review of patient's allergies indicates:  No Known Allergies    No current facility-administered medications on file prior to encounter.      Current Outpatient Prescriptions on File Prior to Encounter   Medication Sig    amiodarone (PACERONE) 200 MG Tab Take 200 mg by mouth once daily.     amlodipine (NORVASC) 10 MG tablet Take 10 mg by mouth once daily.     benazepril (LOTENSIN) 40 MG tablet Take 40 mg by mouth once daily.     metFORMIN (GLUCOPHAGE-XR) 500 MG 24 hr tablet Take 1,000 mg by mouth 2 (two) times daily with meals.     tamsulosin (FLOMAX) 0.4 mg Cp24 Take 0.4 mg by mouth once daily.      Family History     None        Social History Main Topics    Smoking status: Current Every Day Smoker     Packs/day: 1.00     Years: 50.00     Types: Cigarettes    Smokeless tobacco: Never Used    Alcohol use Yes    Drug use: No    Sexual activity: Not on file     Review of Systems   Constitutional: Positive for chills and fever. Negative for activity change, appetite change and fatigue.   HENT: Negative for congestion, hearing loss, sore throat and trouble swallowing.    Eyes: Negative for photophobia and visual disturbance.   Respiratory: Positive for cough. Negative for shortness of breath and wheezing.    Cardiovascular: Negative for chest pain, palpitations and leg swelling.   Gastrointestinal: Positive for nausea and vomiting. Negative for abdominal pain and diarrhea.   Endocrine: Negative for polydipsia, polyphagia and polyuria.   Genitourinary: Negative for difficulty urinating, dysuria, frequency and urgency.   Musculoskeletal: Positive for back pain. Negative for  neck pain and neck stiffness.   Skin: Negative for rash and wound.   Neurological: Negative for dizziness, syncope, weakness, numbness and headaches.   Psychiatric/Behavioral: Positive for confusion. The patient is not nervous/anxious.      Objective:     Vital Signs (Most Recent):  Temp: 98.9 °F (37.2 °C) (05/17/18 2310)  Pulse: 60 (05/17/18 2310)  Resp: 18 (05/17/18 2310)  BP: 123/61 (05/17/18 2310)  SpO2: 95 % (05/17/18 2310) Vital Signs (24h Range):  Temp:  [98.8 °F (37.1 °C)-103.2 °F (39.6 °C)] 98.9 °F (37.2 °C)  Pulse:  [60-92] 60  Resp:  [18-24] 18  SpO2:  [92 %-96 %] 95 %  BP: (120-189)/(56-95) 123/61     Weight: 124.7 kg (275 lb)  Body mass index is 37.3 kg/m².    Physical Exam   Constitutional: He is oriented to person, place, and time. He appears well-developed and well-nourished. No distress.   Obese   HENT:   Head: Normocephalic and atraumatic.   Mouth/Throat: Oropharynx is clear and moist.   Eyes: Conjunctivae and EOM are normal. Pupils are equal, round, and reactive to light. No scleral icterus.   Neck: Normal range of motion. Neck supple. No JVD present. No tracheal deviation present. No thyromegaly present.   Cardiovascular: Normal rate, regular rhythm, normal heart sounds and intact distal pulses.  Exam reveals no gallop and no friction rub.    No murmur heard.  Pulses:       Dorsalis pedis pulses are 2+ on the right side, and 2+ on the left side.   Pulmonary/Chest: No accessory muscle usage. No respiratory distress. He has wheezes. He has rhonchi. He has no rales.   Abdominal: Soft. Bowel sounds are normal. He exhibits no distension and no mass. There is no tenderness. There is no guarding.   Pendulous   Musculoskeletal: Normal range of motion. He exhibits no edema, tenderness or deformity.   Neurological: He is alert and oriented to person, place, and time. He has normal strength. He exhibits normal muscle tone. Coordination normal.   Skin: Skin is warm and intact. Capillary refill takes less  than 2 seconds. No rash noted. He is diaphoretic. No erythema.   Psychiatric: He has a normal mood and affect. His speech is normal and behavior is normal. Judgment and thought content normal.   Vitals reviewed.        CRANIAL NERVES     CN III, IV, VI   Pupils are equal, round, and reactive to light.  Extraocular motions are normal.        Significant Labs:   ABGs:   Recent Labs  Lab 05/17/18 1941   PH 7.417   PCO2 38.8   HCO3 25.0   POCSATURATED 56*   BE 0     CBC:   Recent Labs  Lab 05/17/18 1941   WBC 7.80   HGB 12.2*   HCT 35.8*   *     CMP:   Recent Labs  Lab 05/17/18 1941   *   K 4.0   CL 97   CO2 23   *   BUN 16   CREATININE 1.4   CALCIUM 9.4   PROT 7.6   ALBUMIN 3.9   BILITOT 0.4   ALKPHOS 42*   AST 11   ALT 14   ANIONGAP 12   EGFRNONAA 51*     Lactic Acid:   Recent Labs  Lab 05/17/18 1941 05/17/18  2327   LACTATE 2.4* 2.1  2.1     Urine Studies:   Recent Labs  Lab 05/17/18 1920   COLORU Yellow   APPEARANCEUA Clear   PHUR 6.0   SPECGRAV 1.020   PROTEINUA 3+*   GLUCUA 3+*   KETONESU Negative   BILIRUBINUA Negative   OCCULTUA 2+*   NITRITE Negative   UROBILINOGEN Negative   LEUKOCYTESUR Negative   RBCUA 8*   WBCUA 4   BACTERIA Occasional   SQUAMEPITHEL 1   HYALINECASTS 0       Significant Imaging: I have reviewed all pertinent imaging results/findings within the past 24 hours.

## 2018-05-18 NOTE — HPI
"Jun Stuart is a 72 y.o. Male with PMHx significant for afib, CAD, GERD, HTN, DM and HLD.  He is admitted to the service of hospital medicine with severe sepsis and acute hypoxemic respiratory failure. He presented to the ED via EMS for further evaluation of confusion.  His wife stated that  this am the patient awoke, he began complaining of upper back pain. He was brought to [facility], where the wife states that patient had an "x-ray and EKG." She states that the patient was then given pain medication and discharged. The patient took the pain medication and then drank alcohol.  He became nausous and had 1 episode of emesis.  Wife stated he went to bed and when he tried to get up to go to the bathroom, he couldn't, so she called EMS.  She reported that he had been confused after the pain medication and alcohol.  She stated that he had chills while at [facility], but did not take his temperature.  She endorsed the patient has had and increase in cough that is nonproductive. EMS states that the patient had an CBG of 193 and was 91% on RA on arrival.  "

## 2018-05-18 NOTE — PLAN OF CARE
Problem: Patient Care Overview  Goal: Plan of Care Review  Outcome: Ongoing (interventions implemented as appropriate)  Pt new admit this shift. PT spiked temp while in ER motrin given with good results, Temp remained within normal limits since on the floor Initial Lactate 2.4 awaiting repeat draw. IV fluids started per md orders. Bed low and locked call light within reach pt remained free of falls and distress thus far this shift.

## 2018-05-18 NOTE — PROGRESS NOTES
05/17/18 8613   Patient Assessment/Suction   Level of Consciousness (AVPU) alert   Respiratory Effort Normal;Unlabored   PRE-TX-O2-ETCO2   O2 Device (Oxygen Therapy) nasal cannula   $ Is the patient on Low Flow Oxygen? Yes   Flow (L/min) 4   Oxygen Concentration (%) 36   SpO2 (!) 94 %   Pulse Oximetry Type Intermittent   $ Pulse Oximetry - Single Charge Pulse Oximetry - Single   Pulse 63   Resp 20   Ready to Wean/Extubation Screen   FIO2<=50 (chart decimal) 0.36

## 2018-05-18 NOTE — ASSESSMENT & PLAN NOTE
Chronic problem.  Uncontrolled hyperglycemia upon admission labs.    Check blood glucose level q AC/HS.  Use Novolog Insulin Sliding Scale as needed.   Continue American Diabetic Association 1800 Kcal diet.

## 2018-05-18 NOTE — ASSESSMENT & PLAN NOTE
Historical diagnosis with stent placement.  Did not present with s/s of ACS.  Will continue home BB, ASA, and Statin therapy.  Cardiac monitoring  Monitor closely for s/s of ACS.

## 2018-05-18 NOTE — ASSESSMENT & PLAN NOTE
Review of prior lab work at [facility] on 5/8/18 - Cr was 1.07, now 1.4 on admission.  Likely 2/2 to sepsis.  Aggressive IVF hydration.  Follow renal panel and electrolytes closely.  Adjust renal dose medications for Estimated Creatinine Clearance: 66.9 mL/min (based on SCr of 1.4 mg/dL).  Avoid nonessential nephrotoxic agents.

## 2018-05-18 NOTE — ED NOTES
Pt in room 5 via EMS for evaluation of confusion. Pt is awake and alert, oriented to person, time and city. Pt has SOB with decreased breath sounds. Abd is large, distended and non-tender. Pt seen at hospital in [place] today for not feeling well. Pt is febrile. Pt has taken pain pill today and drinks ETOH daily. Pt smokes cigarettes daily. Wife is at bedside and reports pt vomiting today after eating spaghetti and meatballs.

## 2018-05-18 NOTE — ASSESSMENT & PLAN NOTE
This patient does have evidence of infective focus  My overall impression is severe sepsis.  Source - pneumonia  Antibiotics given-   Antibiotics     Start     Stop Route Frequency Ordered    05/18/18 2200  azithromycin 500 mg in dextrose 5 % 250 mL IVPB (ready to mix system)      05/19 0959 IV ED 1 Time 05/17/18 2248 05/18/18 2100  cefTRIAXone 2 gram/50 mL IVPB 2 g      05/19 0859 IV ED 1 Time 05/17/18 2248          Severe Sepsis only-  Latest labs reviewed, they are-    Recent Labs  Lab 05/17/18 1941   BILITOT 0.4       Recent Labs  Lab 05/17/18 1941   LACTATE 2.4*       Recent Labs  Lab 05/17/18 1941   PH 7.417   PO2 29*   PCO2 38.8   HCO3 25.0   BE 0       Organ dysfunction indicated by Encephalopathy and Acute respiratory failure and ZACHARY      Trend lactate until normalized.  Follow blood, urine and sputum cultures.  Check procalcitonin level.

## 2018-05-18 NOTE — PROGRESS NOTES
Pt trying to get OOB by himself, had legs hanging over side rails and found laying soaked in urine. RT at BS, pt appears sob, O2 sat 87%. Oxygen increased, and pt repositioned in bed. He remains restless trying to continue to get OOB. Dr. Sultan josef MD to transfer pt to unit for closer monitoring.    Renny Sanders RN

## 2018-05-18 NOTE — PROGRESS NOTES
Spoke with Dr. Granados and updated him with patient status. Orders received and placed on chart to start Precedex infusion.

## 2018-05-19 ENCOUNTER — OUTSIDE PLACE OF SERVICE (OUTPATIENT)
Dept: PULMONOLOGY | Facility: CLINIC | Age: 73
End: 2018-05-19
Payer: MEDICARE

## 2018-05-19 LAB
ALLENS TEST: ABNORMAL
ALLENS TEST: ABNORMAL
ANION GAP SERPL CALC-SCNC: 11 MMOL/L
BACTERIA UR CULT: NO GROWTH
BASOPHILS # BLD AUTO: 0 K/UL
BASOPHILS NFR BLD: 0.1 %
BNP SERPL-MCNC: 456 PG/ML
BUN SERPL-MCNC: 20 MG/DL
CALCIUM SERPL-MCNC: 8.3 MG/DL
CHLORIDE SERPL-SCNC: 100 MMOL/L
CO2 SERPL-SCNC: 23 MMOL/L
CREAT SERPL-MCNC: 1.4 MG/DL
DELSYS: ABNORMAL
DELSYS: ABNORMAL
DIFFERENTIAL METHOD: ABNORMAL
EOSINOPHIL # BLD AUTO: 0 K/UL
EOSINOPHIL NFR BLD: 0.1 %
EP: 10
ERYTHROCYTE [DISTWIDTH] IN BLOOD BY AUTOMATED COUNT: 14.6 %
ERYTHROCYTE [SEDIMENTATION RATE] IN BLOOD BY WESTERGREN METHOD: 14 MM/H
ERYTHROCYTE [SEDIMENTATION RATE] IN BLOOD BY WESTERGREN METHOD: 28 MM/H
EST. GFR  (AFRICAN AMERICAN): 58 ML/MIN/1.73 M^2
EST. GFR  (NON AFRICAN AMERICAN): 51 ML/MIN/1.73 M^2
FIO2: 100
FLOW: 15
GLUCOSE SERPL-MCNC: 193 MG/DL
HCO3 UR-SCNC: 22.7 MMOL/L (ref 24–28)
HCO3 UR-SCNC: 24.9 MMOL/L (ref 24–28)
HCT VFR BLD AUTO: 30.8 %
HGB BLD-MCNC: 10.6 G/DL
IP: 25
LACTATE SERPL-SCNC: 1.2 MMOL/L
LACTATE SERPL-SCNC: 1.3 MMOL/L
LACTATE SERPL-SCNC: 2.4 MMOL/L
LYMPHOCYTES # BLD AUTO: 0.6 K/UL
LYMPHOCYTES NFR BLD: 9.9 %
MCH RBC QN AUTO: 34 PG
MCHC RBC AUTO-ENTMCNC: 34.3 G/DL
MCV RBC AUTO: 99 FL
MIN VOL: 38
MODE: ABNORMAL
MODE: ABNORMAL
MONOCYTES # BLD AUTO: 0.5 K/UL
MONOCYTES NFR BLD: 8 %
NEUTROPHILS # BLD AUTO: 4.7 K/UL
NEUTROPHILS NFR BLD: 81.9 %
PCO2 BLDA: 33.3 MMHG (ref 35–45)
PCO2 BLDA: 35.9 MMHG (ref 35–45)
PH SMN: 7.41 [PH] (ref 7.35–7.45)
PH SMN: 7.48 [PH] (ref 7.35–7.45)
PLATELET # BLD AUTO: 84 K/UL
PMV BLD AUTO: 9 FL
PO2 BLDA: 219 MMHG (ref 80–100)
PO2 BLDA: 41 MMHG (ref 80–100)
POC BE: -2 MMOL/L
POC BE: 1 MMOL/L
POC SATURATED O2: 100 % (ref 95–100)
POC SATURATED O2: 77 % (ref 95–100)
POC TCO2: 24 MMOL/L (ref 23–27)
POC TCO2: 26 MMOL/L (ref 23–27)
POCT GLUCOSE: 140 MG/DL (ref 70–110)
POCT GLUCOSE: 174 MG/DL (ref 70–110)
POCT GLUCOSE: 215 MG/DL (ref 70–110)
POTASSIUM SERPL-SCNC: 3.6 MMOL/L
RBC # BLD AUTO: 3.11 M/UL
SAMPLE: ABNORMAL
SAMPLE: ABNORMAL
SITE: ABNORMAL
SITE: ABNORMAL
SODIUM SERPL-SCNC: 134 MMOL/L
SP02: 100
SP02: 88
SPONT RATE: 13
WBC # BLD AUTO: 5.8 K/UL

## 2018-05-19 PROCEDURE — 27000190 HC CPAP FULL FACE MASK W/VALVE

## 2018-05-19 PROCEDURE — 99223 1ST HOSP IP/OBS HIGH 75: CPT | Mod: ,,, | Performed by: INTERNAL MEDICINE

## 2018-05-19 PROCEDURE — 83880 ASSAY OF NATRIURETIC PEPTIDE: CPT

## 2018-05-19 PROCEDURE — S4991 NICOTINE PATCH NONLEGEND: HCPCS | Performed by: NURSE PRACTITIONER

## 2018-05-19 PROCEDURE — 20000000 HC ICU ROOM

## 2018-05-19 PROCEDURE — 87077 CULTURE AEROBIC IDENTIFY: CPT

## 2018-05-19 PROCEDURE — 36600 WITHDRAWAL OF ARTERIAL BLOOD: CPT

## 2018-05-19 PROCEDURE — 25000003 PHARM REV CODE 250: Performed by: INTERNAL MEDICINE

## 2018-05-19 PROCEDURE — 25000242 PHARM REV CODE 250 ALT 637 W/ HCPCS: Performed by: NURSE PRACTITIONER

## 2018-05-19 PROCEDURE — 80048 BASIC METABOLIC PNL TOTAL CA: CPT

## 2018-05-19 PROCEDURE — 63600175 PHARM REV CODE 636 W HCPCS: Performed by: NURSE PRACTITIONER

## 2018-05-19 PROCEDURE — 94640 AIRWAY INHALATION TREATMENT: CPT

## 2018-05-19 PROCEDURE — 83605 ASSAY OF LACTIC ACID: CPT | Mod: 91

## 2018-05-19 PROCEDURE — 85025 COMPLETE CBC W/AUTO DIFF WBC: CPT

## 2018-05-19 PROCEDURE — 87186 SC STD MICRODIL/AGAR DIL: CPT

## 2018-05-19 PROCEDURE — 94660 CPAP INITIATION&MGMT: CPT

## 2018-05-19 PROCEDURE — 36415 COLL VENOUS BLD VENIPUNCTURE: CPT

## 2018-05-19 PROCEDURE — 87040 BLOOD CULTURE FOR BACTERIA: CPT | Mod: 59

## 2018-05-19 PROCEDURE — 63600175 PHARM REV CODE 636 W HCPCS: Performed by: EMERGENCY MEDICINE

## 2018-05-19 PROCEDURE — 94761 N-INVAS EAR/PLS OXIMETRY MLT: CPT

## 2018-05-19 PROCEDURE — 25000003 PHARM REV CODE 250: Performed by: NURSE PRACTITIONER

## 2018-05-19 PROCEDURE — 25000003 PHARM REV CODE 250: Performed by: EMERGENCY MEDICINE

## 2018-05-19 PROCEDURE — 27000221 HC OXYGEN, UP TO 24 HOURS

## 2018-05-19 PROCEDURE — 63600175 PHARM REV CODE 636 W HCPCS: Performed by: INTERNAL MEDICINE

## 2018-05-19 PROCEDURE — 82803 BLOOD GASES ANY COMBINATION: CPT

## 2018-05-19 PROCEDURE — 99900035 HC TECH TIME PER 15 MIN (STAT)

## 2018-05-19 RX ORDER — ACETAMINOPHEN 10 MG/ML
1000 INJECTION, SOLUTION INTRAVENOUS EVERY 8 HOURS
Status: DISCONTINUED | OUTPATIENT
Start: 2018-05-19 | End: 2018-05-19

## 2018-05-19 RX ORDER — ADHESIVE BANDAGE
30 BANDAGE TOPICAL DAILY PRN
Status: DISCONTINUED | OUTPATIENT
Start: 2018-05-19 | End: 2018-05-20 | Stop reason: SDUPTHER

## 2018-05-19 RX ORDER — FUROSEMIDE 10 MG/ML
INJECTION INTRAMUSCULAR; INTRAVENOUS
Status: DISPENSED
Start: 2018-05-19 | End: 2018-05-19

## 2018-05-19 RX ORDER — ACETAMINOPHEN 10 MG/ML
1000 INJECTION, SOLUTION INTRAVENOUS EVERY 8 HOURS
Status: COMPLETED | OUTPATIENT
Start: 2018-05-19 | End: 2018-05-19

## 2018-05-19 RX ORDER — FUROSEMIDE 10 MG/ML
20 INJECTION INTRAMUSCULAR; INTRAVENOUS ONCE
Status: COMPLETED | OUTPATIENT
Start: 2018-05-19 | End: 2018-05-19

## 2018-05-19 RX ADMIN — Medication 1 TABLET: at 09:05

## 2018-05-19 RX ADMIN — IPRATROPIUM BROMIDE AND ALBUTEROL SULFATE 3 ML: .5; 3 SOLUTION RESPIRATORY (INHALATION) at 08:05

## 2018-05-19 RX ADMIN — SODIUM CHLORIDE 2000 MG: 9 INJECTION, SOLUTION INTRAVENOUS at 08:05

## 2018-05-19 RX ADMIN — MONTELUKAST SODIUM 10 MG: 10 TABLET, FILM COATED ORAL at 08:05

## 2018-05-19 RX ADMIN — INSULIN ASPART 1 UNITS: 100 INJECTION, SOLUTION INTRAVENOUS; SUBCUTANEOUS at 09:05

## 2018-05-19 RX ADMIN — GABAPENTIN 300 MG: 300 CAPSULE ORAL at 04:05

## 2018-05-19 RX ADMIN — DEXMEDETOMIDINE HYDROCHLORIDE 0.4 MCG/KG/HR: 100 INJECTION, SOLUTION INTRAVENOUS at 08:05

## 2018-05-19 RX ADMIN — PIPERACILLIN SODIUM AND TAZOBACTAM SODIUM 4.5 G: 4; .5 INJECTION, POWDER, FOR SOLUTION INTRAVENOUS at 12:05

## 2018-05-19 RX ADMIN — IPRATROPIUM BROMIDE AND ALBUTEROL SULFATE 3 ML: .5; 3 SOLUTION RESPIRATORY (INHALATION) at 07:05

## 2018-05-19 RX ADMIN — NICOTINE 1 PATCH: 14 PATCH, EXTENDED RELEASE TRANSDERMAL at 09:05

## 2018-05-19 RX ADMIN — ATORVASTATIN CALCIUM 40 MG: 40 TABLET, FILM COATED ORAL at 09:05

## 2018-05-19 RX ADMIN — PIPERACILLIN SODIUM AND TAZOBACTAM SODIUM 4.5 G: 4; .5 INJECTION, POWDER, FOR SOLUTION INTRAVENOUS at 04:05

## 2018-05-19 RX ADMIN — AMIODARONE HYDROCHLORIDE 200 MG: 200 TABLET ORAL at 09:05

## 2018-05-19 RX ADMIN — ACETAMINOPHEN 1000 MG: 10 INJECTION, SOLUTION INTRAVENOUS at 09:05

## 2018-05-19 RX ADMIN — IPRATROPIUM BROMIDE AND ALBUTEROL SULFATE 3 ML: .5; 3 SOLUTION RESPIRATORY (INHALATION) at 03:05

## 2018-05-19 RX ADMIN — ACETAMINOPHEN 1000 MG: 10 INJECTION, SOLUTION INTRAVENOUS at 01:05

## 2018-05-19 RX ADMIN — IPRATROPIUM BROMIDE AND ALBUTEROL SULFATE 3 ML: .5; 3 SOLUTION RESPIRATORY (INHALATION) at 11:05

## 2018-05-19 RX ADMIN — PANTOPRAZOLE SODIUM 40 MG: 40 TABLET, DELAYED RELEASE ORAL at 09:05

## 2018-05-19 RX ADMIN — GABAPENTIN 300 MG: 300 CAPSULE ORAL at 08:05

## 2018-05-19 RX ADMIN — PIPERACILLIN SODIUM AND TAZOBACTAM SODIUM 4.5 G: 4; .5 INJECTION, POWDER, FOR SOLUTION INTRAVENOUS at 08:05

## 2018-05-19 RX ADMIN — CHLORDIAZEPOXIDE HYDROCHLORIDE 10 MG: 10 CAPSULE ORAL at 02:05

## 2018-05-19 RX ADMIN — CHLORDIAZEPOXIDE HYDROCHLORIDE 10 MG: 10 CAPSULE ORAL at 09:05

## 2018-05-19 RX ADMIN — TAMSULOSIN HYDROCHLORIDE 0.4 MG: 0.4 CAPSULE ORAL at 09:05

## 2018-05-19 RX ADMIN — ASPIRIN 325 MG ORAL TABLET 325 MG: 325 PILL ORAL at 09:05

## 2018-05-19 RX ADMIN — MAGNESIUM HYDROXIDE 2400 MG: 400 SUSPENSION ORAL at 08:05

## 2018-05-19 RX ADMIN — CHLORDIAZEPOXIDE HYDROCHLORIDE 10 MG: 10 CAPSULE ORAL at 05:05

## 2018-05-19 RX ADMIN — ACETAMINOPHEN 1000 MG: 10 INJECTION, SOLUTION INTRAVENOUS at 02:05

## 2018-05-19 RX ADMIN — GABAPENTIN 300 MG: 300 CAPSULE ORAL at 09:05

## 2018-05-19 RX ADMIN — FUROSEMIDE 20 MG: 10 INJECTION, SOLUTION INTRAMUSCULAR; INTRAVENOUS at 12:05

## 2018-05-19 RX ADMIN — SODIUM CHLORIDE 2000 MG: 9 INJECTION, SOLUTION INTRAVENOUS at 09:05

## 2018-05-19 RX ADMIN — ENOXAPARIN SODIUM 40 MG: 100 INJECTION SUBCUTANEOUS at 04:05

## 2018-05-19 RX ADMIN — THIAMINE HCL TAB 100 MG 100 MG: 100 TAB at 09:05

## 2018-05-19 RX ADMIN — IPRATROPIUM BROMIDE AND ALBUTEROL SULFATE 3 ML: .5; 3 SOLUTION RESPIRATORY (INHALATION) at 12:05

## 2018-05-19 NOTE — PLAN OF CARE
Problem: Patient Care Overview  Goal: Plan of Care Review  Outcome: Revised  Received from PCU this evening. Febrile 103.1 oral. Ofirmev 1 gram given IV as ordered. Lasix given and rey placed per orders. Patient very confused and agitated, pulling at lines and interfering with treatment, soft wrist restraints applied bilaterally. Precedex initiated per orders. Skin intact. Safety maintained.

## 2018-05-19 NOTE — PROGRESS NOTES
05/18/18 1920   Patient Assessment/Suction   Level of Consciousness (AVPU) alert   Respiratory Effort Normal;Unlabored   All Lung Fields Breath Sounds coarse   PRE-TX-O2-ETCO2   O2 Device (Oxygen Therapy) High Flow nasal Cannula   Flow (L/min) 15   SpO2 (!) 93 %   Pulse Oximetry Type Continuous   Pulse 64   Resp 20   Aerosol Therapy   $ Aerosol Therapy Charges Aerosol Treatment   Respiratory Treatment Status given   SVN/Inhaler Treatment Route mask   Position During Treatment HOB at 45 degrees   Patient Tolerance good   Post-Treatment   Post-treatment Heart Rate (beats/min) 64   Post-treatment Resp Rate (breaths/min) 20   All Fields Breath Sounds unchanged

## 2018-05-19 NOTE — CONSULTS
Pulmonary/Critical Care Consult      Patient name: Jun Stuart  MRN: [id number]  Date: 05/19/2018    Admit Date: 5/17/2018  Consult Requested By: Erki Granados MD    Reason for Consult: Respiratory failure, possible pneumonia, possible DT    HPI:    5/19/2018 - 73 yo male came to ER with confusion, now admitted with pneumonia, possible sepsis.  By report he had episode of nausea and vomiting at home (possibility of aspiration).  During the night he became agitated and CXR showed increased bilateral infiltrates and had some response to lasix.  He required BiPAP and is more stable.  Also placed on precedex and is currently sedate.  He is not able to provide any history.      Review of Systems    Review of Systems   Unable to perform ROS: Mental status change       Past Medical History    Past Medical History:   Diagnosis Date    Atrial fibrillation     Colon polyp     Coronary artery disease     Diabetes mellitus     GERD (gastroesophageal reflux disease)     Hyperlipidemia     Hypertension      Sleep apnea  Obesity  Cigarette use  EtOH use    Past Surgical History    Past Surgical History:   Procedure Laterality Date    cardioversion  2012    JOINT REPLACEMENT      VASECTOMY         Medications (scheduled):      acetaminophen  1,000 mg Intravenous Q8H    albuterol-ipratropium  3 mL Nebulization Q4H    amiodarone  200 mg Oral Daily    amLODIPine  10 mg Oral Daily    aspirin  325 mg Oral Daily    atorvastatin  40 mg Oral Daily    azithromycin  500 mg Intravenous Q24H    benazepril  40 mg Oral Daily    chlordiazepoxide  10 mg Oral Q8H    enoxaparin  40 mg Subcutaneous Daily    folic acid-vit B6-vit B12 2.5-25-2 mg  1 tablet Oral Daily    furosemide        gabapentin  300 mg Oral TID    metoprolol succinate  25 mg Oral Daily    montelukast  10 mg Oral QHS    nicotine  1 patch Transdermal Daily    pantoprazole  40 mg Oral Daily    piperacillin-tazobactam (ZOSYN) IVPB  4.5 g Intravenous Q8H     tamsulosin  0.4 mg Oral Daily    thiamine  100 mg Oral Daily    vancomycin (VANCOCIN) IVPB  15 mg/kg Intravenous Q12H       Medications (infusions):      dexmedetomidine (PRECEDEX) infusion 0.5 mcg/kg/hr (05/19/18 0400)       Medications (prn):     acetaminophen, dextrose 50%, dextrose 50%, glucagon (human recombinant), glucose, glucose, hydrALAZINE, insulin aspart U-100, lorazepam, lorazepam, lorazepam, ondansetron, traMADol    Family History:   Family History   Problem Relation Age of Onset    Urolithiasis Neg Hx     Sickle cell trait Neg Hx     Kidney cancer Neg Hx        Social History: Tobacco:   History   Smoking Status    Current Every Day Smoker    Packs/day: 1.00    Years: 50.00    Types: Cigarettes   Smokeless Tobacco    Never Used                                EtOH:   History   Alcohol Use    Yes                                Drugs:   History   Drug Use No                                Occupation: not known                             Asbestos exposure: not known    Physical Exam    Vital signs:  Temp:  [98.5 °F (36.9 °C)-103.1 °F (39.5 °C)]   Pulse:  []   Resp:  [16-57]   BP: ()/()   SpO2:  [78 %-100 %]     Intake/Output:   Intake/Output Summary (Last 24 hours) at 05/19/18 0709  Last data filed at 05/19/18 0600   Gross per 24 hour   Intake          2394.23 ml   Output             2150 ml   Net           244.23 ml        BMI: Estimated body mass index is 40.25 kg/m² as calculated from the following:    Height as of this encounter: 6' (1.829 m).    Weight as of this encounter: 134.6 kg (296 lb 11.8 oz).    Physical Exam   Constitutional: He is well-developed, well-nourished, and in no distress. No distress.   obese   HENT:   Head: Normocephalic and atraumatic.   Wearing BiPAP  Some facial swelling   Eyes: Conjunctivae and EOM are normal. Pupils are equal, round, and reactive to light.   Neck: Normal range of motion. Neck supple. No JVD present. No tracheal deviation  present. No thyromegaly present.   Cardiovascular: Normal rate, regular rhythm and normal heart sounds.  Exam reveals no gallop and no friction rub.    No murmur heard.  Pulmonary/Chest: Effort normal. No stridor. No respiratory distress. He has no wheezes. He has rales.   Few rales, decreased at dependent areas   Abdominal: Soft. Bowel sounds are normal. He exhibits no distension. There is no tenderness. There is no rebound.   obese   Musculoskeletal: Normal range of motion. He exhibits edema.   Upper extremity   Neurological:   Sedate, arouses, justice   Skin: Skin is warm and dry. He is not diaphoretic.   Vitals reviewed.      Laboratory    No results for input(s): WBC, RBC, HGB, HCT, PLT, MCV, MCH, MCHC in the last 24 hours.    No results for input(s): GLUCOSE, CALCIUM, PROT, NA, K, CO2, CL, BUN, CREATININE, ALKPHOS, ALT, AST, BILITOT in the last 24 hours.    Invalid input(s):  ALBUMIN    No results for input(s): PT, INR, APTT in the last 24 hours.    No results for input(s): CPK, CPKMB, TROPONINI, MB in the last 24 hours.     CBC, BMP (5/18) - noted  BNP - 456  LA - 1.3  Procalcitonin - 0.78    Additional labs: as above    Microbiology:       Microbiology Results (last 7 days)     Procedure Component Value Units Date/Time    Urine culture [[id number]] Collected:  05/18/18 1130    Order Status:  Sent Specimen:  Urine from Urine, Clean Catch Updated:  05/18/18 1840    Blood culture [[id number]] Collected:  05/17/18 1940    Order Status:  Completed Specimen:  Blood from Antecubital, Right  Arm Updated:  05/18/18 1734     Blood Culture, Routine Gram stain aer bottle: Gram positive cocci in clusters resembling Staph      Blood Culture, Routine Gram stain sharon bottle: Gram positive cocci in clusters resembling Staph      Blood Culture, Routine Results called to and read back by: Renny Hu RN 05/18/2018       Blood Culture, Routine 17:32    Blood culture [[id number]] Collected:  05/17/18 1945    Order Status:   Completed Specimen:  Blood Updated:  05/18/18 1733     Blood Culture, Routine Gram stain aer bottle: Gram positive cocci in clusters resembling Staph      Blood Culture, Routine Gram stain sharon bottle: Gram positive cocci in clusters resembling Staph      Blood Culture, Routine Results called to and read back by: Renny Hu RN 05/18/2018       Blood Culture, Routine 17:32    Culture, Respiratory with Gram Stain [[id number]]     Order Status:  No result Specimen:  Respiratory           Radiology    Imaging Results          X-Ray Chest PA And Lateral (Final result)  Result time 05/18/18 07:29:23    Final result by Larissa Rm MD (05/18/18 07:29:23)                 Impression:      Mildly increased interstitial markings diffusely in each lung.  Differential considerations include mild CHF (noting cardiomegaly) and an acute bronchiolitis.    The preliminary and final reports are concordant.      Electronically signed by: Larissa Rm MD  Date:    05/18/2018  Time:    07:29             Narrative:    EXAMINATION:  XR CHEST PA AND LATERAL    CLINICAL HISTORY:  Fever, unspecified    TECHNIQUE:  PA and lateral views of the chest were performed.    COMPARISON:  None    FINDINGS:  Cardiomegaly is noted.  Diffusely increased interstitial markings are identified in each lung.  No pleural abnormality is seen.  No pneumothorax is seen.  Cardiac loop recorder noted.  No acute osseous abnormality is identified.  Mild degenerative disc disease is present.                               RADIOLOGY REPORT (Final result)  Result time 05/18/18 12:29:10              CXR (5/19) - incaresed bilateral infiltrates looks more like pulmonary edema    Additional Studies          Ventilator Information    Oxygen Concentration (%):  [] 30           Recent Labs  Lab 05/19/18  0127   PH 7.482*   PCO2 33.3*   PO2 219*   HCO3 24.9   POCSATURATED 100   BE 1       Impression/Plan      ICD-10-CM ICD-9-CM   1. Community acquired  pneumonia of left lower lobe of lung J18.1 481   2. Fever R50.9 780.60   3. Acute hypoxemic respiratory failure J96.01 518.81   4. Acute respiratory failure with hypoxemia J96.01 518.81     Respiratory failure, acute hypoxemic  - continue BiPAP for now  - try to decrease sedation and trial off BiPAP later today  - possible penumonia (vs pneumonitis)  - possible component of pulmonary edema    Pneumonia (vs pneumonitis) - LLL  - antibiotics per ID  - follow procalcitonin  - possible aspiration    Pulmonary edema  - when he decompensated CXR looks nore like edema  - diurese (done)  - follow  - ECHO ordered    Bacteremia  - BC + GPC  - await identification  - possible pneumonia, UTI  - continue antibiotics    Delerium  - possible EtOH withdrawal  - continue precedex and try to decrease as able  - thiamine, MVI    H/o EtOH use  - aware    H/o ciagrette use  - aware, will need to discuss with pt    ASCVD  - aware  - follow    Atrial fibrillation - paroxysmal  - currently NSR    DM  - aware    Thank you for this consult.  I will follow with you while the patient is hospitalized.  Please call ([phone]) if you have any questions.    Jas Mendez MD

## 2018-05-19 NOTE — PLAN OF CARE
05/19/18 0804   Patient Assessment/Suction   Level of Consciousness (AVPU) responds to voice   Respiratory Effort Unlabored;Normal   All Lung Fields Breath Sounds coarse   Rhythm/Pattern, Respiratory unlabored;pattern regular;depth regular   Cough Frequency infrequent   Cough Type good;nonproductive   PRE-TX-O2-ETCO2   O2 Device (Oxygen Therapy) BiPAP   $ Is the patient on Low Flow Oxygen? Yes   Oxygen Concentration (%) 30   SpO2 95 %   Pulse Oximetry Type Continuous   $ Pulse Oximetry - Multiple Charge Pulse Oximetry - Multiple   Pulse (!) 55   Resp 18   BP (!) 102/53   Aerosol Therapy   $ Aerosol Therapy Charges Aerosol Treatment   Respiratory Treatment Status given   SVN/Inhaler Treatment Route in-line   Position During Treatment HOB at 45 degrees   Patient Tolerance good   Post-Treatment   Post-treatment Heart Rate (beats/min) 57   Post-treatment Resp Rate (breaths/min) 16   All Fields Breath Sounds unchanged   Ready to Wean/Extubation Screen   FIO2<=50 (chart decimal) 0.3   Preset CPAP/BiPAP Settings   Mode Of Delivery BiPAP S/T   $ CPAP/BiPAP Daily Charge BiPAP/CPAP Daily   $ Initial CPAP/BiPAP Setup? No   $ Is patient using? Yes   Equipment Type V60   Airway Device Type large full face mask   Humidifier not applicable   Ipap 20   EPAP (cm H2O) 10   Pressure Support (cm H2O) 10   Set Rate (Breaths/Min) 14   ITime (sec) 1   Rise Time (sec) 3   Patient CPAP/BiPAP Settings   Timed Inspiration (Sec) 1   IPAP Rise Time (sec) 3   RR Total (Breaths/Min) 19   Tidal Volume (mL) 877   VE Minute Ventilation (L/min) 16.4 L/min   Peak Inspiratory Pressure (cm H2O) 21   TiTOT (%) 40   Total Leak (L/Min) 15   Patient Trigger - ST Mode Only (%) 100   CPAP/BiPAP Alarms   High Pressure (cm H2O) 30   Low Pressure (cm H2O) 10   Minute Ventilation (L/Min) 3   High RR (breaths/min) 40   Low RR (breaths/min) 8

## 2018-05-19 NOTE — PLAN OF CARE
Problem: Patient Care Overview  Goal: Plan of Care Review  Shift goals discussed with patient's family with time given for questions and answers.    Comments: Remains on Precedex to keep calm, titrated for effect. Due to hypoxic episode during this shift the patient was put on BiPap and lasix given. Patient improved and rested well the remainder of the shift. Patient did have elevated temp during shift and was medicated with IV Tylenol with temperature returning to normal.

## 2018-05-19 NOTE — CONSULTS
Consult received approximately 45 min after I had left the hospital  Reviewed the chart in its entirety   72 year old man with a history of diabetes, smoking and alcohol abuse admitted with suspected pneumonia, nausea and vomiting, chest discomfort and altered mental status after taking a pain pill and 2 alcoholic beverages. Placed on rocephin and azithromycin.   Today his CXR worsened with obvious left lower lobe infiltrate, had worsening agitation suspicious for alcohol withdrawal and was moved to ICU. There was also return of blood cultures with GPC in clusters in 4/4 bottles. Vancomycin was added to regimen which was altered to zosyn plus azithromycin.    I will not be available in person 5/19 but will be there on 5/20.  I would recommend stopping azithromycin, and if blood cultures are consistent with STaph aureus tomorrow, I would change zosyn to ceftaroline 600 mg IV q8(in addition to the vanc) pending sensitivities.   I would recommend repeating the blood cultures and obtaining an echocardiogram.     I would be happy to discuss the case by phone with any of his providers.

## 2018-05-19 NOTE — PROGRESS NOTES
Spoke with Dr Padilla regarding Dr Armijo's note. New orders written per recommendation. Notified Dr Padilla of c/o numbness to left fingers and back pain.

## 2018-05-19 NOTE — SIGNIFICANT EVENT
RN notified me that patient is having increased work of breathing.  Ordered ABG and CXR stat.  Went to bedside.  PE: distressed, tachypneic, accessory muscle usage, dusky, Oxygen saturation high 70's on 15L NC.  Hypertensive, tachycardic.  Oriented to self and place. MILIAN to command.  Cardiac:  Tachycardic, no murmurs, no gallops  Resp:  Bilateral insp and exp wheezing and rales  Abdomen: protuberant, soft, non-tender    Plan:    Lasix 20mg IV x 1 dose  Bipap - continuous  ABG in 1 hour  Monitor closely for further decline

## 2018-05-19 NOTE — PROGRESS NOTES
Progress Note    Admit Date: 5/17/2018   LOS: 2 days     SUBJECTIVE:     Interval history: Now on Bipap. Family present. Pt coherent. Denies any issues at this time. Off Bipap, reports that his breathing is baseline. No acute events reported per nursing staff.     Scheduled Meds:   acetaminophen  1,000 mg Intravenous Q8H    albuterol-ipratropium  3 mL Nebulization Q4H    amiodarone  200 mg Oral Daily    amLODIPine  10 mg Oral Daily    aspirin  325 mg Oral Daily    atorvastatin  40 mg Oral Daily    azithromycin  500 mg Intravenous Q24H    benazepril  40 mg Oral Daily    chlordiazepoxide  10 mg Oral Q8H    enoxaparin  40 mg Subcutaneous Daily    folic acid-vit B6-vit B12 2.5-25-2 mg  1 tablet Oral Daily    furosemide        gabapentin  300 mg Oral TID    metoprolol succinate  25 mg Oral Daily    montelukast  10 mg Oral QHS    nicotine  1 patch Transdermal Daily    pantoprazole  40 mg Oral Daily    piperacillin-tazobactam (ZOSYN) IVPB  4.5 g Intravenous Q8H    tamsulosin  0.4 mg Oral Daily    thiamine  100 mg Oral Daily    vancomycin (VANCOCIN) IVPB  15 mg/kg Intravenous Q12H     Continuous Infusions:   dexmedetomidine (PRECEDEX) infusion 0.4 mcg/kg/hr (05/19/18 0815)     PRN Meds:acetaminophen, dextrose 50%, dextrose 50%, glucagon (human recombinant), glucose, glucose, hydrALAZINE, insulin aspart U-100, lorazepam, lorazepam, lorazepam, ondansetron, traMADol    Review of patient's allergies indicates:  No Known Allergies    Review of Systems  Negative for CP/SOB without bipap.     OBJECTIVE:     Vital Signs (Most Recent)  Temp: 98.5 °F (36.9 °C) (05/19/18 0400)  Pulse: (!) 55 (05/19/18 0804)  Resp: 18 (05/19/18 0804)  BP: (!) 101/51 (05/19/18 0600)  SpO2: 95 % (05/19/18 0804)    Vital Signs Range (Last 24H):  Temp:  [98.5 °F (36.9 °C)-103.1 °F (39.5 °C)]   Pulse:  []   Resp:  [16-57]   BP: ()/()   SpO2:  [78 %-100 %]     I & O (Last 24H):  Intake/Output Summary (Last 24 hours) at  05/19/18 1009  Last data filed at 05/19/18 0926   Gross per 24 hour   Intake          2394.23 ml   Output             2125 ml   Net           269.23 ml     Physical Exam:  Constitutional: He appears well-developed and well-nourished. No distress.   Obese   Cardiovascular: Normal rate, regular rhythm, normal heart sounds and intact distal pulses.  Exam reveals no gallop and no friction rub.    No murmur heard.  Abdominal: Soft. He exhibits no distension and no mass. There is no tenderness. There is no guarding.   Musculoskeletal: Normal range of motion. He exhibits no edema, tenderness or deformity.   Psychiatric: He has a normal mood and affect. His speech is normal and behavior is normal. Judgment and thought content normal.     Laboratory:  CBC:   Recent Labs  Lab 05/19/18  0716   WBC 5.80   RBC 3.11*   HGB 10.6*   HCT 30.8*   PLT 84*   MCV 99*   MCH 34.0*   MCHC 34.3     BMP:   Recent Labs  Lab 05/19/18  0716   *   *   K 3.6      CO2 23   BUN 20   CREATININE 1.4   CALCIUM 8.3*     CMP:   Recent Labs  Lab 05/17/18  1941 05/19/18  0716   *  < > 193*   CALCIUM 9.4  < > 8.3*   ALBUMIN 3.9  --   --    PROT 7.6  --   --    *  < > 134*   K 4.0  < > 3.6   CO2 23  < > 23   CL 97  < > 100   BUN 16  < > 20   CREATININE 1.4  < > 1.4   ALKPHOS 42*  --   --    ALT 14  --   --    AST 11  --   --    BILITOT 0.4  --   --    < > = values in this interval not displayed.      ASSESSMENT/PLAN:     Acute hypoxemic respiratory failure     Now on Bipap  2/2 CAP (see below)     Community acquired pneumonia of left lower lobe of lung     IV Azithromycin and Rocephin  Bronchodilator treatments q4h  Antipyretics prn  Supplemental O2 via nasal cannula  Febrile over night.        Severe sepsis      This patient does have evidence of infective focus  My overall impression is severe sepsis.  Source - pneumonia  Antibiotics given-                                              Antibiotics            Start     Stop  Route Frequency Ordered     05/18/18 2200   azithromycin 500 mg in dextrose 5 % 250 mL IVPB (ready to mix system)      05/19 0959 IV ED 1 Time 05/17/18 2248     05/18/18 2100   cefTRIAXone 2 gram/50 mL IVPB 2 g      05/19 0859 IV ED 1 Time 05/17/18 2248                    Severe Sepsis only-  Latest labs reviewed, they are-     Recent Labs  Lab 05/17/18 1941   BILITOT 0.4      Recent Labs  Lab 05/17/18 1941   LACTATE 2.4*      Recent Labs  Lab 05/17/18 1941   PH 7.417   PO2 29*   PCO2 38.8   HCO3 25.0   BE 0      Organ dysfunction indicated by Encephalopathy and Acute respiratory failure and ZACHARY  Trend lactate until normalized.  Follow blood, urine and sputum cultures.  Check procalcitonin level.           Type 2 diabetes mellitus with hyperglycemia      Chronic problem.  Uncontrolled hyperglycemia upon admission labs.    Check blood glucose level q AC/HS.  Use Novolog Insulin Sliding Scale as needed.   Continue American Diabetic Association 1800 Kcal diet.          UTI  Follow urine C/S. Continue iV antibiotics.                ZACHARY (acute kidney injury)      Review of prior lab work at [facility] on 5/8/18 - Cr was 1.07, now 1.4 on admission. Likely 2/2 to sepsis.  IVF hydration.  Follow renal panel and electrolytes closely.  Adjust renal dose medications for Estimated Creatinine Clearance: 66.9 mL/min (based on SCr of 1.4 mg/dL).  Avoid nonessential nephrotoxic agents.         Tobacco abuse      Dangers of cigarette smoking were reviewed with patient in detail for 3 minutes and patient was encouraged to quit. Nicotine replacement options were discussed - option to use Nicoderm.         Alcohol use      Reportedly drinks etoh daily without history of DT's  Nursing to perform CIWA assessment, monitoring closesly for DT's (tachycardia, diaphoresis, hypertension, and aggitation)  Will utilize multivitamins  Educated on the deleterious effects of alcohol use  Seizure precautions with prn ativan   Fall precautions          HTN (hypertension)      Chronic problem.  Labile.  Continue home antihypertensive regimen and monitor b/p closely.  Adjust medications as necessary.         CAD (coronary artery disease)      Historical diagnosis with stent placement.  Did not present with s/s of ACS.  Will continue home BB, ASA, and Statin therapy.  Cardiac monitoring  Monitor closely for s/s of ACS.         Paroxysmal atrial fibrillation      S/P cardioversion.  Has been in NSR since.  Not on anticoagulant therapy.  Continue home BB, Amiodarone.  Cardiac monitoring.       Discussed with multiple family members.            VTE Risk Mitigation          Ordered       enoxaparin injection 40 mg  Daily      05/17/18 2248       IP VTE LOW RISK PATIENT  Once      05/17/18 2248     Portions of this note were created using Dragon voice recognition software. There may be voice recognition errors found in the text, and attempts were made to correct these errors prior to signature    Cody Padilla MD    Family Medicine  5/19/2018

## 2018-05-20 LAB
ANION GAP SERPL CALC-SCNC: 10 MMOL/L
BACTERIA BLD CULT: NORMAL
BASOPHILS # BLD AUTO: 0 K/UL
BASOPHILS NFR BLD: 0.1 %
BUN SERPL-MCNC: 22 MG/DL
CALCIUM SERPL-MCNC: 8.8 MG/DL
CHLORIDE SERPL-SCNC: 101 MMOL/L
CO2 SERPL-SCNC: 24 MMOL/L
CREAT SERPL-MCNC: 1.3 MG/DL
DIFFERENTIAL METHOD: ABNORMAL
EOSINOPHIL # BLD AUTO: 0 K/UL
EOSINOPHIL NFR BLD: 0.1 %
ERYTHROCYTE [DISTWIDTH] IN BLOOD BY AUTOMATED COUNT: 14.8 %
EST. GFR  (AFRICAN AMERICAN): >60 ML/MIN/1.73 M^2
EST. GFR  (NON AFRICAN AMERICAN): 55 ML/MIN/1.73 M^2
GLUCOSE SERPL-MCNC: 189 MG/DL
HCT VFR BLD AUTO: 29.7 %
HGB BLD-MCNC: 10.1 G/DL
LYMPHOCYTES # BLD AUTO: 0.7 K/UL
LYMPHOCYTES NFR BLD: 11.3 %
MCH RBC QN AUTO: 33.8 PG
MCHC RBC AUTO-ENTMCNC: 34 G/DL
MCV RBC AUTO: 100 FL
MONOCYTES # BLD AUTO: 0.6 K/UL
MONOCYTES NFR BLD: 9.6 %
NEUTROPHILS # BLD AUTO: 4.7 K/UL
NEUTROPHILS NFR BLD: 78.9 %
PLATELET # BLD AUTO: 79 K/UL
PMV BLD AUTO: 10.2 FL
POCT GLUCOSE: 177 MG/DL (ref 70–110)
POCT GLUCOSE: 203 MG/DL (ref 70–110)
POCT GLUCOSE: 218 MG/DL (ref 70–110)
POTASSIUM SERPL-SCNC: 3.9 MMOL/L
PROCALCITONIN SERPL IA-MCNC: 1.48 NG/ML
RBC # BLD AUTO: 2.99 M/UL
SODIUM SERPL-SCNC: 135 MMOL/L
VANCOMYCIN TROUGH SERPL-MCNC: 22.3 UG/ML
WBC # BLD AUTO: 6 K/UL

## 2018-05-20 PROCEDURE — 99900035 HC TECH TIME PER 15 MIN (STAT)

## 2018-05-20 PROCEDURE — 25000242 PHARM REV CODE 250 ALT 637 W/ HCPCS: Performed by: NURSE PRACTITIONER

## 2018-05-20 PROCEDURE — S4991 NICOTINE PATCH NONLEGEND: HCPCS | Performed by: NURSE PRACTITIONER

## 2018-05-20 PROCEDURE — 25000003 PHARM REV CODE 250: Performed by: FAMILY MEDICINE

## 2018-05-20 PROCEDURE — 63600175 PHARM REV CODE 636 W HCPCS: Performed by: INTERNAL MEDICINE

## 2018-05-20 PROCEDURE — 25000003 PHARM REV CODE 250: Performed by: NURSE PRACTITIONER

## 2018-05-20 PROCEDURE — 80048 BASIC METABOLIC PNL TOTAL CA: CPT

## 2018-05-20 PROCEDURE — 80202 ASSAY OF VANCOMYCIN: CPT

## 2018-05-20 PROCEDURE — 25000003 PHARM REV CODE 250: Performed by: INTERNAL MEDICINE

## 2018-05-20 PROCEDURE — 99233 SBSQ HOSP IP/OBS HIGH 50: CPT | Mod: ,,, | Performed by: INTERNAL MEDICINE

## 2018-05-20 PROCEDURE — 63600175 PHARM REV CODE 636 W HCPCS: Performed by: NURSE PRACTITIONER

## 2018-05-20 PROCEDURE — 94761 N-INVAS EAR/PLS OXIMETRY MLT: CPT

## 2018-05-20 PROCEDURE — 63600175 PHARM REV CODE 636 W HCPCS: Performed by: EMERGENCY MEDICINE

## 2018-05-20 PROCEDURE — 94660 CPAP INITIATION&MGMT: CPT

## 2018-05-20 PROCEDURE — 85025 COMPLETE CBC W/AUTO DIFF WBC: CPT

## 2018-05-20 PROCEDURE — 84145 PROCALCITONIN (PCT): CPT

## 2018-05-20 PROCEDURE — 87205 SMEAR GRAM STAIN: CPT

## 2018-05-20 PROCEDURE — 94799 UNLISTED PULMONARY SVC/PX: CPT

## 2018-05-20 PROCEDURE — 25000003 PHARM REV CODE 250: Performed by: EMERGENCY MEDICINE

## 2018-05-20 PROCEDURE — 12000002 HC ACUTE/MED SURGE SEMI-PRIVATE ROOM

## 2018-05-20 PROCEDURE — 36415 COLL VENOUS BLD VENIPUNCTURE: CPT

## 2018-05-20 PROCEDURE — 87070 CULTURE OTHR SPECIMN AEROBIC: CPT

## 2018-05-20 PROCEDURE — 94640 AIRWAY INHALATION TREATMENT: CPT

## 2018-05-20 PROCEDURE — 27000221 HC OXYGEN, UP TO 24 HOURS

## 2018-05-20 RX ORDER — POLYETHYLENE GLYCOL 3350 17 G/17G
17 POWDER, FOR SOLUTION ORAL DAILY PRN
Status: DISCONTINUED | OUTPATIENT
Start: 2018-05-20 | End: 2018-05-30 | Stop reason: HOSPADM

## 2018-05-20 RX ORDER — ADHESIVE BANDAGE
30 BANDAGE TOPICAL DAILY PRN
Status: DISCONTINUED | OUTPATIENT
Start: 2018-05-20 | End: 2018-05-30 | Stop reason: HOSPADM

## 2018-05-20 RX ADMIN — INSULIN ASPART 2 UNITS: 100 INJECTION, SOLUTION INTRAVENOUS; SUBCUTANEOUS at 05:05

## 2018-05-20 RX ADMIN — ACETAMINOPHEN 650 MG: 325 TABLET, FILM COATED ORAL at 10:05

## 2018-05-20 RX ADMIN — ASPIRIN 325 MG ORAL TABLET 325 MG: 325 PILL ORAL at 08:05

## 2018-05-20 RX ADMIN — PIPERACILLIN SODIUM AND TAZOBACTAM SODIUM 4.5 G: 4; .5 INJECTION, POWDER, FOR SOLUTION INTRAVENOUS at 10:05

## 2018-05-20 RX ADMIN — Medication 1 TABLET: at 08:05

## 2018-05-20 RX ADMIN — IPRATROPIUM BROMIDE AND ALBUTEROL SULFATE 3 ML: .5; 3 SOLUTION RESPIRATORY (INHALATION) at 07:05

## 2018-05-20 RX ADMIN — AMLODIPINE BESYLATE 10 MG: 5 TABLET ORAL at 08:05

## 2018-05-20 RX ADMIN — ACETAMINOPHEN 650 MG: 325 TABLET, FILM COATED ORAL at 11:05

## 2018-05-20 RX ADMIN — AMIODARONE HYDROCHLORIDE 200 MG: 200 TABLET ORAL at 08:05

## 2018-05-20 RX ADMIN — THIAMINE HCL TAB 100 MG 100 MG: 100 TAB at 08:05

## 2018-05-20 RX ADMIN — GABAPENTIN 300 MG: 300 CAPSULE ORAL at 08:05

## 2018-05-20 RX ADMIN — IPRATROPIUM BROMIDE AND ALBUTEROL SULFATE 3 ML: .5; 3 SOLUTION RESPIRATORY (INHALATION) at 11:05

## 2018-05-20 RX ADMIN — POLYETHYLENE GLYCOL (3350) 17 G: 17 POWDER, FOR SOLUTION ORAL at 04:05

## 2018-05-20 RX ADMIN — IPRATROPIUM BROMIDE AND ALBUTEROL SULFATE 3 ML: .5; 3 SOLUTION RESPIRATORY (INHALATION) at 06:05

## 2018-05-20 RX ADMIN — BENAZEPRIL HYDROCHLORIDE 40 MG: 10 TABLET, FILM COATED ORAL at 08:05

## 2018-05-20 RX ADMIN — ACETAMINOPHEN 650 MG: 325 TABLET, FILM COATED ORAL at 05:05

## 2018-05-20 RX ADMIN — GABAPENTIN 300 MG: 300 CAPSULE ORAL at 02:05

## 2018-05-20 RX ADMIN — IPRATROPIUM BROMIDE AND ALBUTEROL SULFATE 3 ML: .5; 3 SOLUTION RESPIRATORY (INHALATION) at 04:05

## 2018-05-20 RX ADMIN — VANCOMYCIN HYDROCHLORIDE 1750 MG: 1 INJECTION, POWDER, LYOPHILIZED, FOR SOLUTION INTRAVENOUS at 01:05

## 2018-05-20 RX ADMIN — PIPERACILLIN SODIUM AND TAZOBACTAM SODIUM 4.5 G: 4; .5 INJECTION, POWDER, FOR SOLUTION INTRAVENOUS at 07:05

## 2018-05-20 RX ADMIN — CHLORDIAZEPOXIDE HYDROCHLORIDE 10 MG: 10 CAPSULE ORAL at 01:05

## 2018-05-20 RX ADMIN — ENOXAPARIN SODIUM 40 MG: 100 INJECTION SUBCUTANEOUS at 06:05

## 2018-05-20 RX ADMIN — CHLORDIAZEPOXIDE HYDROCHLORIDE 10 MG: 10 CAPSULE ORAL at 05:05

## 2018-05-20 RX ADMIN — TAMSULOSIN HYDROCHLORIDE 0.4 MG: 0.4 CAPSULE ORAL at 08:05

## 2018-05-20 RX ADMIN — TRAMADOL HYDROCHLORIDE 50 MG: 50 TABLET, FILM COATED ORAL at 12:05

## 2018-05-20 RX ADMIN — ATORVASTATIN CALCIUM 40 MG: 40 TABLET, FILM COATED ORAL at 08:05

## 2018-05-20 RX ADMIN — INSULIN ASPART 1 UNITS: 100 INJECTION, SOLUTION INTRAVENOUS; SUBCUTANEOUS at 09:05

## 2018-05-20 RX ADMIN — PANTOPRAZOLE SODIUM 40 MG: 40 TABLET, DELAYED RELEASE ORAL at 08:05

## 2018-05-20 RX ADMIN — GABAPENTIN 300 MG: 300 CAPSULE ORAL at 09:05

## 2018-05-20 RX ADMIN — IPRATROPIUM BROMIDE AND ALBUTEROL SULFATE 3 ML: .5; 3 SOLUTION RESPIRATORY (INHALATION) at 03:05

## 2018-05-20 RX ADMIN — NICOTINE 1 PATCH: 14 PATCH, EXTENDED RELEASE TRANSDERMAL at 08:05

## 2018-05-20 RX ADMIN — PIPERACILLIN SODIUM AND TAZOBACTAM SODIUM 4.5 G: 4; .5 INJECTION, POWDER, FOR SOLUTION INTRAVENOUS at 04:05

## 2018-05-20 RX ADMIN — MONTELUKAST SODIUM 10 MG: 10 TABLET, FILM COATED ORAL at 09:05

## 2018-05-20 RX ADMIN — MAGNESIUM HYDROXIDE 2400 MG: 400 SUSPENSION ORAL at 12:05

## 2018-05-20 RX ADMIN — METOPROLOL SUCCINATE 25 MG: 25 TABLET, EXTENDED RELEASE ORAL at 08:05

## 2018-05-20 RX ADMIN — CHLORDIAZEPOXIDE HYDROCHLORIDE 10 MG: 10 CAPSULE ORAL at 09:05

## 2018-05-20 NOTE — PROGRESS NOTES
Progress Note  Pulmonary/Critical Care      Admit Date: 5/17/2018    SUBJECTIVE:     HPI/Interval history (See H&P for complete P,F,SHx) :     5/20/2018 - Looks much better today, tolerated bipap overnight, now sitting up in chair is alert and responsive.  No new complaints today.  Still with some cough and congestion as well as some right sided pleuritic chest pain.    Review of Systems: List if applicable    Pain scale: 3/10    Review of Systems   Constitutional: Positive for malaise/fatigue. Negative for chills, diaphoresis, fever and weight loss.   HENT: Negative for congestion.    Eyes: Negative for pain.   Respiratory: Positive for cough, sputum production, shortness of breath and wheezing. Negative for hemoptysis and stridor.    Cardiovascular: Positive for chest pain. Negative for palpitations, orthopnea, claudication, leg swelling and PND.   Gastrointestinal: Negative for abdominal pain, constipation, diarrhea, heartburn, nausea and vomiting.   Genitourinary: Negative for dysuria, frequency and urgency.   Musculoskeletal: Negative for falls and myalgias.   Neurological: Negative for sensory change, focal weakness and weakness.   Psychiatric/Behavioral: Negative for depression. The patient is not nervous/anxious.        OBJECTIVE:     Vital Signs Range (Last 24H):  Temp:  [98.3 °F (36.8 °C)-102 °F (38.9 °C)]   Pulse:  [57-96]   Resp:  [16-34]   BP: ()/()   SpO2:  [91 %-98 %]     I & O (Last 24H):    Intake/Output Summary (Last 24 hours) at 05/20/18 0837  Last data filed at 05/20/18 0600   Gross per 24 hour   Intake          2673.64 ml   Output             1950 ml   Net           723.64 ml       Estimated body mass index is 40.14 kg/m² as calculated from the following:    Height as of this encounter: 6' (1.829 m).    Weight as of this encounter: 134.3 kg (296 lb).    Vent Settings- Oxygen Concentration (%):  [30-40] 30    ABG    Recent Labs  Lab 05/19/18  0127   PH 7.482*   PO2 219*   PCO2 33.3*    HCO3 24.9   BE 1       Physical Exam:  Physical Exam   Constitutional: He is oriented to person, place, and time. No distress.   overweight   HENT:   Head: Normocephalic and atraumatic.   Eyes: Conjunctivae and EOM are normal. Pupils are equal, round, and reactive to light.   Neck: Normal range of motion. Neck supple. No JVD present. No tracheal deviation present. No thyromegaly present.   Cardiovascular: Normal rate, regular rhythm and normal heart sounds.  Exam reveals no gallop and no friction rub.    No murmur heard.  Pulmonary/Chest: Effort normal. No stridor. No respiratory distress. He has wheezes. He has rales. He exhibits no tenderness.   Abdominal: Soft. Bowel sounds are normal. He exhibits no distension. There is no tenderness. There is no rebound.   obese   Musculoskeletal: Normal range of motion. He exhibits edema. He exhibits no tenderness.   Lymphadenopathy:     He has no cervical adenopathy.   Neurological: He is alert and oriented to person, place, and time.   justice   Skin: Skin is warm and dry. He is not diaphoretic.   Vitals reviewed.      Laboratory/Diagnostic Data:    Recent Results (from the past 336 hour(s))   CBC auto differential    Collection Time: 05/20/18  3:06 AM   Result Value Ref Range    WBC 6.00 3.90 - 12.70 K/uL    Hemoglobin 10.1 (L) 14.0 - 18.0 g/dL    Hematocrit 29.7 (L) 40.0 - 54.0 %    Platelets 79 (L) 150 - 350 K/uL   CBC auto differential    Collection Time: 05/19/18  7:16 AM   Result Value Ref Range    WBC 5.80 3.90 - 12.70 K/uL    Hemoglobin 10.6 (L) 14.0 - 18.0 g/dL    Hematocrit 30.8 (L) 40.0 - 54.0 %    Platelets 84 (L) 150 - 350 K/uL   CBC auto differential    Collection Time: 05/18/18  6:05 AM   Result Value Ref Range    WBC 7.20 3.90 - 12.70 K/uL    Hemoglobin 11.3 (L) 14.0 - 18.0 g/dL    Hematocrit 32.8 (L) 40.0 - 54.0 %    Platelets 85 (L) 150 - 350 K/uL       Recent Results (from the past 336 hour(s))   Basic metabolic panel    Collection Time: 05/20/18  3:06 AM    Result Value Ref Range    Sodium 135 (L) 136 - 145 mmol/L    Potassium 3.9 3.5 - 5.1 mmol/L    Chloride 101 95 - 110 mmol/L    CO2 24 23 - 29 mmol/L    BUN, Bld 22 8 - 23 mg/dL    Creatinine 1.3 0.5 - 1.4 mg/dL    Calcium 8.8 8.7 - 10.5 mg/dL    Anion Gap 10 8 - 16 mmol/L   Basic metabolic panel    Collection Time: 05/19/18  7:16 AM   Result Value Ref Range    Sodium 134 (L) 136 - 145 mmol/L    Potassium 3.6 3.5 - 5.1 mmol/L    Chloride 100 95 - 110 mmol/L    CO2 23 23 - 29 mmol/L    BUN, Bld 20 8 - 23 mg/dL    Creatinine 1.4 0.5 - 1.4 mg/dL    Calcium 8.3 (L) 8.7 - 10.5 mg/dL    Anion Gap 11 8 - 16 mmol/L   Basic metabolic panel    Collection Time: 05/18/18  6:05 AM   Result Value Ref Range    Sodium 133 (L) 136 - 145 mmol/L    Potassium 4.1 3.5 - 5.1 mmol/L    Chloride 100 95 - 110 mmol/L    CO2 21 (L) 23 - 29 mmol/L    BUN, Bld 17 8 - 23 mg/dL    Creatinine 1.2 0.5 - 1.4 mg/dL    Calcium 8.8 8.7 - 10.5 mg/dL    Anion Gap 12 8 - 16 mmol/L       Lab Results   Component Value Date    ALT 14 05/17/2018    AST 11 05/17/2018    ALKPHOS 42 (L) 05/17/2018    BILITOT 0.4 05/17/2018       No results for input(s): PT in the last 24 hours.    Invalid input(s):  INR,  APTT    Microbiology    Microbiology Results (last 7 days)     Procedure Component Value Units Date/Time    Blood culture [[id number]] Collected:  05/17/18 1945    Order Status:  Completed Specimen:  Blood Updated:  05/20/18 0804     Blood Culture, Routine Gram stain aer bottle: Gram positive cocci in clusters resembling Staph      Blood Culture, Routine Gram stain sharon bottle: Gram positive cocci in clusters resembling Staph      Blood Culture, Routine Results called to and read back by: Renny Hu RN 05/18/2018       Blood Culture, Routine 17:32     Blood Culture, Routine --     STAPHYLOCOCCUS AUREUS  ID consult required at [facility].[place] and [place] locations.  For susceptibility see order #[id number]      Blood culture [[id number]] Collected:   05/17/18 1940    Order Status:  Completed Specimen:  Blood from Antecubital, Right  Arm Updated:  05/20/18 0802     Blood Culture, Routine Gram stain aer bottle: Gram positive cocci in clusters resembling Staph      Blood Culture, Routine Gram stain sharon bottle: Gram positive cocci in clusters resembling Staph      Blood Culture, Routine Results called to and read back by: Renny Hu RN 05/18/2018       Blood Culture, Routine 17:32     Blood Culture, Routine --     STAPHYLOCOCCUS AUREUS  Susceptibility pending  ID consult required at [facility],[place] and [facility] locations.      Blood culture [[id number]] Collected:  05/19/18 1356    Order Status:  Completed Specimen:  Blood Updated:  05/20/18 0715     Blood Culture, Routine No Growth to date    Narrative:       Blood cultures x 2    Blood culture [[id number]] Collected:  05/19/18 1346    Order Status:  Completed Specimen:  Blood Updated:  05/20/18 0715     Blood Culture, Routine No Growth to date    Narrative:       X 2 sets    Urine culture [[id number]] Collected:  05/18/18 1130    Order Status:  Completed Specimen:  Urine from Urine, Clean Catch Updated:  05/19/18 2147     Urine Culture, Routine No growth    Culture, Respiratory with Gram Stain [[id number]]     Order Status:  No result Specimen:  Respiratory           Radiology    Imaging Results          X-Ray Chest PA And Lateral (Final result)  Result time 05/18/18 07:29:23    Final result by Larissa Rm MD (05/18/18 07:29:23)                 Impression:      Mildly increased interstitial markings diffusely in each lung.  Differential considerations include mild CHF (noting cardiomegaly) and an acute bronchiolitis.    The preliminary and final reports are concordant.      Electronically signed by: Larissa Rm MD  Date:    05/18/2018  Time:    07:29             Narrative:    EXAMINATION:  XR CHEST PA AND LATERAL    CLINICAL HISTORY:  Fever, unspecified    TECHNIQUE:  PA and lateral views of  the chest were performed.    COMPARISON:  None    FINDINGS:  Cardiomegaly is noted.  Diffusely increased interstitial markings are identified in each lung.  No pleural abnormality is seen.  No pneumothorax is seen.  Cardiac loop recorder noted.  No acute osseous abnormality is identified.  Mild degenerative disc disease is present.                               RADIOLOGY REPORT (Final result)  Result time 05/18/18 12:29:10              CXR - 5/20 - resolving LLL pneumonia, some pulm ederma    Medications:     albuterol-ipratropium  3 mL Nebulization Q4H    amiodarone  200 mg Oral Daily    amLODIPine  10 mg Oral Daily    aspirin  325 mg Oral Daily    atorvastatin  40 mg Oral Daily    benazepril  40 mg Oral Daily    chlordiazepoxide  10 mg Oral Q8H    enoxaparin  40 mg Subcutaneous Daily    folic acid-vit B6-vit B12 2.5-25-2 mg  1 tablet Oral Daily    gabapentin  300 mg Oral TID    metoprolol succinate  25 mg Oral Daily    montelukast  10 mg Oral QHS    nicotine  1 patch Transdermal Daily    pantoprazole  40 mg Oral Daily    piperacillin-tazobactam (ZOSYN) IVPB  4.5 g Intravenous Q8H    tamsulosin  0.4 mg Oral Daily    thiamine  100 mg Oral Daily    vancomycin (VANCOCIN) IVPB  15 mg/kg Intravenous Q12H        dexmedetomidine (PRECEDEX) infusion Stopped (05/20/18 0500)       acetaminophen, dextrose 50%, dextrose 50%, glucagon (human recombinant), glucose, glucose, hydrALAZINE, insulin aspart U-100, lorazepam, lorazepam, lorazepam, magnesium hydroxide 400 mg/5 ml, ondansetron, sulfur hexafluoride microspheres, traMADol    ASSESSMENT/PLAN:     Active Problems:    Active Hospital Problems    Diagnosis  POA    *Acute hypoxemic respiratory failure [J96.01]  Yes    Alcohol use [Z78.9]  Yes    Tobacco abuse [Z72.0]  Yes    ZACHARY (acute kidney injury) [N17.9]  Yes    Community acquired pneumonia of left lower lobe of lung [J18.1]  Yes    Severe sepsis [A41.9, R65.20]  Yes    Paroxysmal atrial  fibrillation [I48.0]  Yes    CAD (coronary artery disease) [I25.10]  Yes    HTN (hypertension) [I10]  Yes    Type 2 diabetes mellitus with hyperglycemia [E11.65]  Yes      Resolved Hospital Problems    Diagnosis Date Resolved POA   No resolved problems to display.     Respiratory failure, acute hypoxemic  - better, use bipap prn and qhs  - wean O2 as able  - ok to transfer to floor     Pneumonia (vs pneumonitis) - LLL  - better radiographically and clinically  - antibiotics per ID  - procalcitonin - 0.78  - possible aspiration     Pulmonary edema  - continue to diurese, better  - ECHO ordered     Bacteremia  - BC + staph, repeat cultures pending  - possible pneumonia, UTI  - continue antibiotics per ID     Delerium  - much better today  - thiamine, MVI     H/o EtOH use  - aware     H/o cigarette use  - aware, will need to discuss with pt     ASCVD  - aware  - follow     Atrial fibrillation - paroxysmal  - currently NSR     DM  - aware    Anemia  - HCT has decreased but no acute blood loss   - follow, transfuse for hgb < 7    Overall improved, could be moved to floor from pulmonary standpoint    I will continue to follow with the pt.  Please call me at [phone] if you have any questions.      Jas Mendez MD

## 2018-05-20 NOTE — PLAN OF CARE
05/20/18 1847   Patient Assessment/Suction   Level of Consciousness (AVPU) alert   Respiratory Effort Unlabored   All Lung Fields Breath Sounds diminished   ODIN Breath Sounds diminished   LLL Breath Sounds diminished   RUL Breath Sounds diminished   PRE-TX-O2-ETCO2   O2 Device (Oxygen Therapy) nasal cannula   $ Is the patient on Low Flow Oxygen? Yes   Flow (L/min) 5   SpO2 (!) 92 %   Pulse Oximetry Type Continuous   $ Pulse Oximetry - Multiple Charge Pulse Oximetry - Multiple   Pulse 77   Resp 20   Aerosol Therapy   $ Aerosol Therapy Charges Aerosol Treatment   Respiratory Treatment Status given   SVN/Inhaler Treatment Route with oxygen;mask   Position During Treatment Sitting in chair   Patient Tolerance good   Post-Treatment   Post-treatment Heart Rate (beats/min) 74   Post-treatment Resp Rate (breaths/min) 21   All Fields Breath Sounds unchanged   Incentive Spirometer   $ Incentive Spirometer Charges done with encouragement   Administration (Incentive Spirometer) done with encouragement   Number of Repetitions (Incentive Spirometer) 8   Level (mL) (Incentive Spirometer) 1200   Patient Tolerance good   Preset CPAP/BiPAP Settings   Mode Of Delivery Standby

## 2018-05-20 NOTE — PROGRESS NOTES
05/19/18 1911   Patient Assessment/Suction   Level of Consciousness (AVPU) alert   Respiratory Effort Normal;Unlabored   All Lung Fields Breath Sounds diminished;wheezes, expiratory   PRE-TX-O2-ETCO2   O2 Device (Oxygen Therapy) nasal cannula w/ humidification   Flow (L/min) 5   Oxygen Concentration (%) 40   SpO2 (!) 92 %   Pulse Oximetry Type Continuous   Pulse 93   Resp (!) 24   Aerosol Therapy   $ Aerosol Therapy Charges Aerosol Treatment   Respiratory Treatment Status given   SVN/Inhaler Treatment Route mask   Position During Treatment HOB at 45 degrees   Patient Tolerance good   Post-Treatment   Post-treatment Heart Rate (beats/min) 92   Post-treatment Resp Rate (breaths/min) 20   All Fields Breath Sounds unchanged   Ready to Wean/Extubation Screen   FIO2<=50 (chart decimal) 0.4   Preset CPAP/BiPAP Settings   Mode Of Delivery Standby

## 2018-05-20 NOTE — CONSULTS
Date: 5/20/2018   uJn Stuart [id number] is a 72 y.o. male who has been consulted for vancomycin dosing.    The patient has the following labs: Scr 1.3 Crcl 75 WBC 6      Current weight is 134.3 kg (296 lb)    Pt is receiving vancomycin 2000 mg every 12 hours.  Vancomycin trough from 5/20/2018 at 5/20/18 was 22.3 mg/dL.  Target goal is 15-20 mg/dL.    Trough was drawn on time and anticipate it is  Supratherapeutic. Pharmacy will decrease current dose.     The patient will be changed to a vancomycin dose of 1750 mg every 12 hours. The vancomycin trough has been ordered for 5/22/18 at 0030.    Patient will be followed by pharmacy for changes in renal function, toxicity, and efficacy.    Thank you for allowing us to participate in this patient's care.     Carlos Alberto Sloan, PharmD

## 2018-05-20 NOTE — PLAN OF CARE
Problem: Patient Care Overview  Goal: Plan of Care Review  Outcome: Ongoing (interventions implemented as appropriate)  Bipap at bedside for PRN and HS, aerosol txs and IS Q4 hrs tolerates well

## 2018-05-20 NOTE — NURSING
Received report from ICU nurse Yumiko. Patient arrived to floor via wheelchair. Patient up in chair with wife at bedside. No complaints at this time. Will continue to monitor.

## 2018-05-20 NOTE — NURSING
Report given to Pillo on 3rd floor. Patient transferred via wheelchair with O2. Wife is at bedside.

## 2018-05-20 NOTE — PROGRESS NOTES
Progress Note    Admit Date: 5/17/2018   LOS: 3 days     SUBJECTIVE:     Interval history: No acute events reported over night. Continues to have improvement in respiratory symptoms and has ambulated without SOB. Now without CP but reports constipation and low back pain which is intermittent.     Scheduled Meds:   albuterol-ipratropium  3 mL Nebulization Q4H    amiodarone  200 mg Oral Daily    amLODIPine  10 mg Oral Daily    aspirin  325 mg Oral Daily    atorvastatin  40 mg Oral Daily    benazepril  40 mg Oral Daily    chlordiazepoxide  10 mg Oral Q8H    enoxaparin  40 mg Subcutaneous Daily    folic acid-vit B6-vit B12 2.5-25-2 mg  1 tablet Oral Daily    gabapentin  300 mg Oral TID    metoprolol succinate  25 mg Oral Daily    montelukast  10 mg Oral QHS    nicotine  1 patch Transdermal Daily    pantoprazole  40 mg Oral Daily    piperacillin-tazobactam (ZOSYN) IVPB  4.5 g Intravenous Q8H    tamsulosin  0.4 mg Oral Daily    thiamine  100 mg Oral Daily    vancomycin (VANCOCIN) IVPB  1,750 mg Intravenous Q12H     Continuous Infusions:   dexmedetomidine (PRECEDEX) infusion Stopped (05/20/18 0500)     PRN Meds:acetaminophen, dextrose 50%, dextrose 50%, glucagon (human recombinant), glucose, glucose, hydrALAZINE, insulin aspart U-100, lorazepam, lorazepam, lorazepam, magnesium hydroxide 400 mg/5 ml, ondansetron, sulfur hexafluoride microspheres, traMADol    Review of patient's allergies indicates:  No Known Allergies    Review of Systems  Positive for low back pain and constipation. Negative for SOB/CP.    OBJECTIVE:     Vital Signs (Most Recent)  Temp: 100 °F (37.8 °C) (05/20/18 0800)  Pulse: 79 (05/20/18 0900)  Resp: (!) 35 (05/20/18 0900)  BP: (!) 150/65 (05/20/18 0900)  SpO2: (!) 93 % (05/20/18 0900)    Vital Signs Range (Last 24H):  Temp:  [98.3 °F (36.8 °C)-102 °F (38.9 °C)]   Pulse:  [59-96]   Resp:  [0-35]   BP: (106-204)/()   SpO2:  [91 %-98 %]     I & O (Last 24H):  Intake/Output Summary  (Last 24 hours) at 05/20/18 1103  Last data filed at 05/20/18 0800   Gross per 24 hour   Intake          2673.64 ml   Output             1825 ml   Net           848.64 ml     Physical Exam:  Constitutional: He appears well-developed and well-nourished. No distress.   Obese   Cardiovascular: Normal rate, regular rhythm, normal heart sounds and intact distal pulses.  Exam reveals no gallop and no friction rub.    No murmur heard.  Abdominal: Soft. He exhibits no distension and no mass. There is no tenderness. There is no guarding.   Resp: Positive for rhales. No increased WOB.   Musculoskeletal: Normal range of motion. He exhibits no edema, tenderness or deformity.   Psychiatric: He has a normal mood and affect. His speech is normal and behavior is normal. Judgment and thought content normal.     Laboratory:  CBC:   Recent Labs  Lab 05/20/18  0306   WBC 6.00   RBC 2.99*   HGB 10.1*   HCT 29.7*   PLT 79*   *   MCH 33.8*   MCHC 34.0     BMP:   Recent Labs  Lab 05/20/18  0306   *   *   K 3.9      CO2 24   BUN 22   CREATININE 1.3   CALCIUM 8.8     CMP:   Recent Labs  Lab 05/17/18  1941  05/20/18  0306   *  < > 189*   CALCIUM 9.4  < > 8.8   ALBUMIN 3.9  --   --    PROT 7.6  --   --    *  < > 135*   K 4.0  < > 3.9   CO2 23  < > 24   CL 97  < > 101   BUN 16  < > 22   CREATININE 1.4  < > 1.3   ALKPHOS 42*  --   --    ALT 14  --   --    AST 11  --   --    BILITOT 0.4  --   --    < > = values in this interval not displayed.      ASSESSMENT/PLAN:     Acute hypoxemic respiratory failure            Resolved.             Transfer to floor per pulm. Appreciate assistance.      Community acquired pneumonia of left lower lobe of lung             IV Vanc/Zosyn            Bronchodilator treatments q4h            Antipyretics prn            Supplemental O2 via nasal cannula            Repeat B/Cx's positive for staph. ID following, sensitivities pending.                         Type 2 diabetes  mellitus with hyperglycemia       Chronic problem.  Uncontrolled hyperglycemia upon admission labs.    Check blood glucose level q AC/HS.  Use Novolog Insulin Sliding Scale as needed.   Continue American Diabetic Association 1800 Kcal diet.            UTI            Follow urine C/S. Continue iV antibiotics.                   ZACHARY (acute kidney injury)       Improved on IVF's.  IVF hydration.  Follow renal panel and electrolytes closely.  Adjust renal dose medications for Estimated Creatinine Clearance: 66.9 mL/min (based on SCr of 1.4 mg/dL).  Avoid nonessential nephrotoxic agents.           Tobacco abuse       Dangers of cigarette smoking were reviewed with patient in detail for 3 minutes and patient was encouraged to quit. Nicotine replacement options were discussed - option to use Nicoderm.           Alcohol use       Reportedly drinks etoh daily without history of DT's  Nursing to perform CIWA assessment, monitoring closesly for DT's (tachycardia, diaphoresis, hypertension, and aggitation)  Will utilize multivitamins  Educated on the deleterious effects of alcohol use  Seizure precautions with prn ativan   Fall precautions           HTN (hypertension)       Chronic problem.  Labile.  Continue home antihypertensive regimen and monitor b/p closely.  Adjust medications as necessary.           CAD (coronary artery disease)       Historical diagnosis with stent placement.  Did not present with s/s of ACS.  Will continue home BB, ASA, and Statin therapy.  Cardiac monitoring  Monitor closely for s/s of ACS.           Paroxysmal atrial fibrillation       S/P cardioversion.  Has been in NSR since.  Not on anticoagulant therapy.  Continue home BB, Amiodarone.  Cardiac monitoring.        Discussed with multiple family members.                        VTE Risk Mitigation          Ordered       enoxaparin injection 40 mg  Daily      05/17/18 2248       IP VTE LOW RISK PATIENT  Once      05/17/18 2248      Portions of this  note were created using Dragon voice recognition software. There may be voice recognition errors found in the text, and attempts were made to correct these errors prior to signature    Cody Padilla MD    Family Medicine  5/20/2018

## 2018-05-20 NOTE — CONSULTS
Consult Note  Infectious Disease    Reason for Consult:  Sepsis, Staph aureus    HPI: Jun Stuart is a  72 y.o. male who was admitted through the ED on 5/17 for altered mental status, hypoxemia, chills and fever, nausea and vomiting. He was placed empirically on rocephin and zithromax. His breathing worsened and his confusion did as well and he was moved to the ICU, placed on bipap, given lasix, and rocephin changed to zosyn and vanc added when blood cultures became positive for GPC, now identified as Staph aureus. I reviewed the chart on 5/18 and left recs. Zithromax was stopped, an echo was performed and repeat blood cultures were done and are thus far. Negative. His CXR evolved a LLL infiltrate which is already improved. He required precedex for a period and his mental status is back to baseline . He has not had a staph infection for over 10 years and no one near him has either. He has no sinus congestion, purulent nasal discharge, but does have discolored sputum.    Review of patient's allergies indicates:  No Known Allergies  Past Medical History:   Diagnosis Date    Atrial fibrillation     Colon polyp     Coronary artery disease     Diabetes mellitus     GERD (gastroesophageal reflux disease)     Hyperlipidemia     Hypertension      Past Surgical History:   Procedure Laterality Date    cardioversion  2012    JOINT REPLACEMENT      VASECTOMY     Event recorder left parasternal chest    Social History     Social History    Marital status:      Spouse name: N/A    Number of children: N/A    Years of education: N/A     Social History Main Topics    Smoking status: Current Every Day Smoker     Packs/day: 1.00     Years: 50.00     Types: Cigarettes    Smokeless tobacco: Never Used    Alcohol use Yes, 6 oz spirits per d ay    Drug use: No    Sexual activity: Not Asked     Other Topics Concern    None     Social History Narrative    None     Family History   Problem Relation Age of Onset     Urolithiasis Neg Hx     Sickle cell trait Neg Hx     Kidney cancer Neg Hx        Pertinent medications noted:     Review of Systems:   5/16 chills,and no fever until he came to the ed  No change in vision, loss of vision or diplopia  No sinus congestion, purulent nasal discharge, post nasal drip or facial pain  No pain in mouth or throat. No problems with teeth, gums  No angina, palpitations, syncope  mild cough, thick brownish sputum production, possible pleurisy,no  hemoptysis, positive shortness of breath, dyspnea on exertion, or snoring/apnea  No nausea, vomiting, diarrhea,  or focal abd pain  No dysuria,   No swelling of joints, redness of joints, injuries,but he does have pain in his back. Hard to tell by his description if it is pleurisy or MSK  No unusual headaches, dizziness, vertigo, numbness, paresthesias, neuropathy, falls  No anxiety, depression,   No bleeding, lymphadenopathy, anemia, malignancy, unusual bruising    EXAM & DIAGNOSTICS REVIEWED:   Vitals:     Temp:  [98.3 °F (36.8 °C)-102 °F (38.9 °C)]   Temp: 100 °F (37.8 °C) (05/20/18 0800)  Pulse: 79 (05/20/18 0900)  Resp: (!) 35 (05/20/18 0900)  BP: (!) 150/65 (05/20/18 0900)  SpO2: (!) 93 % (05/20/18 0900)    Intake/Output Summary (Last 24 hours) at 05/20/18 0915  Last data filed at 05/20/18 0800   Gross per 24 hour   Intake          2673.64 ml   Output             2100 ml   Net           573.64 ml       General:  In NAD. Looks non toxic. Alert and attentive, cooperative  Eyes:  Anicteric, PERRL, EOMI  ENT:  Mouth w/ pink MMM, no lesions/exudate, food dentition  Neck:  Trachea midline, supple, no adenopathy appreciated  Lungs: Decreased BS throughout. No consolidation, pleural rub or wheezing  Heart:  RRR, no gallop/murmur noted  Abd:  soft, NT, ND, normal BS, obese, no masses/organomegaly appreciated.  :  Lind draining yellow urine, clear  Musc:  Joints without effusion, swelling,  erythema, synovitis, right total knee scar. No point  tenderness along spine  Skin:  Generally warm, dry, normal for color. No rashes. No palmar or plantar    lesions. No subungual petechiae. Some pretibial hyperpigmentation  Wound:   Neuro: AAOx3, speech clear, moves all extrems equally  Extrem: Trace LE edema, no erythema, phlebitis, cellulitis,   VAD:    Lines/Tubes/Drains:    General Labs reviewed:    Recent Labs  Lab 05/20/18  0306   WBC 6.00   RBC 2.99*   HGB 10.1*   HCT 29.7*   PLT 79*   *   MCH 33.8*   MCHC 34.0       Recent Labs  Lab 05/20/18  0306   CALCIUM 8.8   *   K 3.9   CO2 24      BUN 22   CREATININE 1.3           Micro: 5/17/18 blood cultures with STaph aureus, sensitivities pending    Imaging Reviewed:   CXR s    IMPRESSION & PLAN   1. Staph aureus bacteremia and pneumonia, LLL pneumonia  2. Pulmonary edema, improved, requiring bipap  3. Alcohol abuse, with suspect withdrawal, requiring precedex  4. Diabetes  5.  TKA right, in situ    Recommendation:   Continue vancomycin. If this is MRSA, the zosyn can be stopped  Follow up echocardiogram report  CT chest, no contrast in am.  Whole body bone scan  Will need a minimum of 14d of IV treatment

## 2018-05-21 LAB
ANION GAP SERPL CALC-SCNC: 9 MMOL/L
AORTIC ROOT ANNULUS: 3.34 CM
AORTIC VALVE CUSP SEPERATION: 2.56 CM
AV MEAN GRADIENT: 7.95 MMHG
AV VALVE AREA: 4.66 CM2
BASOPHILS # BLD AUTO: 0 K/UL
BASOPHILS NFR BLD: 0.1 %
BSA FOR ECHO PROCEDURE: 2.61 M2
BUN SERPL-MCNC: 17 MG/DL
CALCIUM SERPL-MCNC: 8.9 MG/DL
CHLORIDE SERPL-SCNC: 101 MMOL/L
CO2 SERPL-SCNC: 24 MMOL/L
CREAT SERPL-MCNC: 1.3 MG/DL
DIFFERENTIAL METHOD: ABNORMAL
DOP CALC AO PEAK VEL: 1.73 M/S
DOP CALC AO VTI: 0.28 CM
DOP CALC LVOT AREA: 4.83 CM2
DOP CALC LVOT DIAMETER: 2.48 CM
DOP CALC LVOT STROKE VOLUME: 1.3 CM3
DOP CALCLVOT PEAK VEL VTI: 0.27 CM
E WAVE DECELERATION TIME: 197.12 MSEC
E/A RATIO: 1.6
E/E' RATIO: 10.46
ECHO EF ESTIMATED: 65 %
EOSINOPHIL # BLD AUTO: 0 K/UL
EOSINOPHIL NFR BLD: 0.3 %
ERYTHROCYTE [DISTWIDTH] IN BLOOD BY AUTOMATED COUNT: 15.1 %
EST. GFR  (AFRICAN AMERICAN): >60 ML/MIN/1.73 M^2
EST. GFR  (NON AFRICAN AMERICAN): 55 ML/MIN/1.73 M^2
GLUCOSE SERPL-MCNC: 198 MG/DL
HCT VFR BLD AUTO: 27.5 %
HGB BLD-MCNC: 9.5 G/DL
IVRT: 0.06 MSEC
LEFT ATRIUM SIZE: 5.03 CM
LV LATERAL E/E' RATIO: 11.33
LV SEPTAL E/E' RATIO: 9.71
LYMPHOCYTES # BLD AUTO: 0.6 K/UL
LYMPHOCYTES NFR BLD: 10.3 %
MCH RBC QN AUTO: 34.2 PG
MCHC RBC AUTO-ENTMCNC: 34.6 G/DL
MCV RBC AUTO: 99 FL
MONOCYTES # BLD AUTO: 0.7 K/UL
MONOCYTES NFR BLD: 11.1 %
MV PEAK A VEL: 0.85 M/S
MV PEAK E VEL: 1.36 M/S
MV STENOSIS PRESSURE HALF TIME: 57.17 MS
MV VALVE AREA P 1/2 METHOD: 3.85 CM2
NEUTROPHILS # BLD AUTO: 4.9 K/UL
NEUTROPHILS NFR BLD: 78.2 %
PLATELET # BLD AUTO: 85 K/UL
PLATELET BLD QL SMEAR: ABNORMAL
PMV BLD AUTO: 10.2 FL
POCT GLUCOSE: 186 MG/DL (ref 70–110)
POCT GLUCOSE: 219 MG/DL (ref 70–110)
POCT GLUCOSE: 233 MG/DL (ref 70–110)
POCT GLUCOSE: 235 MG/DL (ref 70–110)
POTASSIUM SERPL-SCNC: 3.6 MMOL/L
RBC # BLD AUTO: 2.78 M/UL
SODIUM SERPL-SCNC: 134 MMOL/L
TDI LATERAL: 0.12
TDI SEPTAL: 0.14
TDI: 0.13
WBC # BLD AUTO: 6.3 K/UL

## 2018-05-21 PROCEDURE — 25000003 PHARM REV CODE 250: Performed by: FAMILY MEDICINE

## 2018-05-21 PROCEDURE — 99900035 HC TECH TIME PER 15 MIN (STAT)

## 2018-05-21 PROCEDURE — 93010 ELECTROCARDIOGRAM REPORT: CPT | Mod: ,,, | Performed by: INTERNAL MEDICINE

## 2018-05-21 PROCEDURE — 93005 ELECTROCARDIOGRAM TRACING: CPT

## 2018-05-21 PROCEDURE — 80048 BASIC METABOLIC PNL TOTAL CA: CPT

## 2018-05-21 PROCEDURE — 25000242 PHARM REV CODE 250 ALT 637 W/ HCPCS: Performed by: NURSE PRACTITIONER

## 2018-05-21 PROCEDURE — 94761 N-INVAS EAR/PLS OXIMETRY MLT: CPT

## 2018-05-21 PROCEDURE — 25000003 PHARM REV CODE 250: Performed by: NURSE PRACTITIONER

## 2018-05-21 PROCEDURE — 94660 CPAP INITIATION&MGMT: CPT

## 2018-05-21 PROCEDURE — S4991 NICOTINE PATCH NONLEGEND: HCPCS | Performed by: NURSE PRACTITIONER

## 2018-05-21 PROCEDURE — 63600175 PHARM REV CODE 636 W HCPCS: Performed by: NURSE PRACTITIONER

## 2018-05-21 PROCEDURE — 85025 COMPLETE CBC W/AUTO DIFF WBC: CPT

## 2018-05-21 PROCEDURE — 25000003 PHARM REV CODE 250: Performed by: EMERGENCY MEDICINE

## 2018-05-21 PROCEDURE — 27000221 HC OXYGEN, UP TO 24 HOURS

## 2018-05-21 PROCEDURE — 25000003 PHARM REV CODE 250: Performed by: INTERNAL MEDICINE

## 2018-05-21 PROCEDURE — 94799 UNLISTED PULMONARY SVC/PX: CPT

## 2018-05-21 PROCEDURE — 94640 AIRWAY INHALATION TREATMENT: CPT

## 2018-05-21 PROCEDURE — 36415 COLL VENOUS BLD VENIPUNCTURE: CPT

## 2018-05-21 PROCEDURE — 63600175 PHARM REV CODE 636 W HCPCS: Performed by: INTERNAL MEDICINE

## 2018-05-21 PROCEDURE — 99233 SBSQ HOSP IP/OBS HIGH 50: CPT | Mod: ,,, | Performed by: INTERNAL MEDICINE

## 2018-05-21 PROCEDURE — 12000002 HC ACUTE/MED SURGE SEMI-PRIVATE ROOM

## 2018-05-21 RX ORDER — CEFAZOLIN SODIUM 2 G/50ML
2 SOLUTION INTRAVENOUS
Status: DISCONTINUED | OUTPATIENT
Start: 2018-05-21 | End: 2018-05-30 | Stop reason: HOSPADM

## 2018-05-21 RX ORDER — ENOXAPARIN SODIUM 150 MG/ML
1 INJECTION SUBCUTANEOUS
Status: DISCONTINUED | OUTPATIENT
Start: 2018-05-21 | End: 2018-05-25

## 2018-05-21 RX ORDER — SENNOSIDES 8.6 MG/1
8.6 TABLET ORAL DAILY PRN
Status: DISCONTINUED | OUTPATIENT
Start: 2018-05-21 | End: 2018-05-21

## 2018-05-21 RX ORDER — AMOXICILLIN 250 MG
1 CAPSULE ORAL DAILY PRN
Status: DISCONTINUED | OUTPATIENT
Start: 2018-05-21 | End: 2018-05-30 | Stop reason: HOSPADM

## 2018-05-21 RX ORDER — LIDOCAINE 50 MG/G
1 PATCH TOPICAL
Status: DISCONTINUED | OUTPATIENT
Start: 2018-05-21 | End: 2018-05-30 | Stop reason: HOSPADM

## 2018-05-21 RX ORDER — SYRING-NEEDL,DISP,INSUL,0.3 ML 29 G X1/2"
296 SYRINGE, EMPTY DISPOSABLE MISCELLANEOUS ONCE
Status: COMPLETED | OUTPATIENT
Start: 2018-05-21 | End: 2018-05-21

## 2018-05-21 RX ADMIN — TAMSULOSIN HYDROCHLORIDE 0.4 MG: 0.4 CAPSULE ORAL at 08:05

## 2018-05-21 RX ADMIN — ATORVASTATIN CALCIUM 40 MG: 40 TABLET, FILM COATED ORAL at 08:05

## 2018-05-21 RX ADMIN — CHLORDIAZEPOXIDE HYDROCHLORIDE 10 MG: 10 CAPSULE ORAL at 02:05

## 2018-05-21 RX ADMIN — AMLODIPINE BESYLATE 10 MG: 5 TABLET ORAL at 08:05

## 2018-05-21 RX ADMIN — METOPROLOL SUCCINATE 25 MG: 25 TABLET, EXTENDED RELEASE ORAL at 08:05

## 2018-05-21 RX ADMIN — IPRATROPIUM BROMIDE AND ALBUTEROL SULFATE 3 ML: .5; 3 SOLUTION RESPIRATORY (INHALATION) at 03:05

## 2018-05-21 RX ADMIN — THIAMINE HCL TAB 100 MG 100 MG: 100 TAB at 08:05

## 2018-05-21 RX ADMIN — IPRATROPIUM BROMIDE AND ALBUTEROL SULFATE 3 ML: .5; 3 SOLUTION RESPIRATORY (INHALATION) at 07:05

## 2018-05-21 RX ADMIN — CEFAZOLIN SODIUM 2 G: 2 SOLUTION INTRAVENOUS at 09:05

## 2018-05-21 RX ADMIN — GABAPENTIN 300 MG: 300 CAPSULE ORAL at 08:05

## 2018-05-21 RX ADMIN — VANCOMYCIN HYDROCHLORIDE 1750 MG: 1 INJECTION, POWDER, LYOPHILIZED, FOR SOLUTION INTRAVENOUS at 01:05

## 2018-05-21 RX ADMIN — MAGESIUM CITRATE 296 ML: 1.75 LIQUID ORAL at 05:05

## 2018-05-21 RX ADMIN — ASPIRIN 325 MG ORAL TABLET 325 MG: 325 PILL ORAL at 08:05

## 2018-05-21 RX ADMIN — INSULIN ASPART 2 UNITS: 100 INJECTION, SOLUTION INTRAVENOUS; SUBCUTANEOUS at 05:05

## 2018-05-21 RX ADMIN — POLYETHYLENE GLYCOL (3350) 17 G: 17 POWDER, FOR SOLUTION ORAL at 06:05

## 2018-05-21 RX ADMIN — BENAZEPRIL HYDROCHLORIDE 40 MG: 10 TABLET, FILM COATED ORAL at 08:05

## 2018-05-21 RX ADMIN — GABAPENTIN 300 MG: 300 CAPSULE ORAL at 02:05

## 2018-05-21 RX ADMIN — AMIODARONE HYDROCHLORIDE 200 MG: 200 TABLET ORAL at 08:05

## 2018-05-21 RX ADMIN — IPRATROPIUM BROMIDE AND ALBUTEROL SULFATE 3 ML: .5; 3 SOLUTION RESPIRATORY (INHALATION) at 11:05

## 2018-05-21 RX ADMIN — MONTELUKAST SODIUM 10 MG: 10 TABLET, FILM COATED ORAL at 08:05

## 2018-05-21 RX ADMIN — INSULIN ASPART 2 UNITS: 100 INJECTION, SOLUTION INTRAVENOUS; SUBCUTANEOUS at 12:05

## 2018-05-21 RX ADMIN — CEFAZOLIN SODIUM 2 G: 2 SOLUTION INTRAVENOUS at 01:05

## 2018-05-21 RX ADMIN — NICOTINE 1 PATCH: 14 PATCH, EXTENDED RELEASE TRANSDERMAL at 08:05

## 2018-05-21 RX ADMIN — ACETAMINOPHEN 650 MG: 325 TABLET, FILM COATED ORAL at 08:05

## 2018-05-21 RX ADMIN — PIPERACILLIN SODIUM AND TAZOBACTAM SODIUM 4.5 G: 4; .5 INJECTION, POWDER, FOR SOLUTION INTRAVENOUS at 12:05

## 2018-05-21 RX ADMIN — CHLORDIAZEPOXIDE HYDROCHLORIDE 10 MG: 10 CAPSULE ORAL at 09:05

## 2018-05-21 RX ADMIN — Medication 1 TABLET: at 08:05

## 2018-05-21 RX ADMIN — HYDRALAZINE HYDROCHLORIDE 10 MG: 20 INJECTION INTRAMUSCULAR; INTRAVENOUS at 11:05

## 2018-05-21 RX ADMIN — LIDOCAINE 1 PATCH: 50 PATCH TOPICAL at 05:05

## 2018-05-21 RX ADMIN — PANTOPRAZOLE SODIUM 40 MG: 40 TABLET, DELAYED RELEASE ORAL at 08:05

## 2018-05-21 RX ADMIN — PIPERACILLIN SODIUM AND TAZOBACTAM SODIUM 4.5 G: 4; .5 INJECTION, POWDER, FOR SOLUTION INTRAVENOUS at 03:05

## 2018-05-21 RX ADMIN — STANDARDIZED SENNA CONCENTRATE AND DOCUSATE SODIUM 1 TABLET: 8.6; 5 TABLET, FILM COATED ORAL at 05:05

## 2018-05-21 RX ADMIN — INSULIN ASPART 1 UNITS: 100 INJECTION, SOLUTION INTRAVENOUS; SUBCUTANEOUS at 09:05

## 2018-05-21 RX ADMIN — ENOXAPARIN SODIUM 140 MG: 150 INJECTION SUBCUTANEOUS at 05:05

## 2018-05-21 RX ADMIN — ACETAMINOPHEN 650 MG: 325 TABLET, FILM COATED ORAL at 03:05

## 2018-05-21 RX ADMIN — TRAMADOL HYDROCHLORIDE 50 MG: 50 TABLET, FILM COATED ORAL at 06:05

## 2018-05-21 RX ADMIN — CHLORDIAZEPOXIDE HYDROCHLORIDE 10 MG: 10 CAPSULE ORAL at 05:05

## 2018-05-21 NOTE — PROGRESS NOTES
RT entered pt's room around 1900 5/20/2018 and  he was on his cell phone with his wife on speaker. He said some pretty lady just walked in, RT ignored his comment. Pts wife asked him, did they bring the machine to measure your oxygen, pt stated I dont know what the hell your talking about. RT told pt yes they did, RT pointed it out to the pt and explained the top blue number is you oxygen level. He stated that's not it, its the bottom one. RT explained again at this time his oxygen was 92%. He hung the phone up and told his wife he wasn't listening to her anymore. When he did that he told me I know why you arent , RT stated Jennifer been  for 14years, pt then told me well it must only be because you stay on your knees a lot. RT did not say anything in response to this rude comment. I told pt I would be back to turn his breathing treatment off in 5 minutes, he told me he was fucking special and I was not leaving his room. I explained I had to go see another pt, he told me if I wasn't back in 5 minutes he was taking it off. I told pt to not take it off because he would not have any oxygen. He told me he didn't fucking care and that's why I dont need to leave his room.  RT left the room and explained the situation to Radha Mendoza Charge Nurse, she went into the pt's room and asked what he had told the Respiratory Therapist and explained this was not the way we communicate with staff etc. RT returned to the room to take his treatment off and pt gave an apology about how he talked and stated he was just trying to pick with me. RT excepted and felt it was best to no longer go in his room, RT passed the care of the pt to Emani SANDHU who is in charge tonight.

## 2018-05-21 NOTE — PROGRESS NOTES
"Progress Note  Hospital Medicine  Patient Name:Jun Stuart  MRN:  [id number]  Patient Class: IP- Inpatient  Admit Date: 5/17/2018  Length of Stay: 4 days  Expected Discharge Date:   Attending Physician: Erik Granados MD  Primary Care Provider:  Juanjo Park MD    SUBJECTIVE:     Principal Problem: Acute hypoxemic respiratory failure  Initial history of present illness: Jun Stuart is a 72 y.o. Male with PMHx significant for afib, CAD, GERD, HTN, DM and HLD.  He is admitted to the service of hospital medicine with severe sepsis and acute hypoxemic respiratory failure. He presented to the ED via EMS for further evaluation of confusion.  His wife stated that  this am the patient awoke, he began complaining of upper back pain. He was brought to [facility], where the wife states that patient had an "x-ray and EKG." She states that the patient was then given pain medication and discharged. The patient took the pain medication and then drank alcohol.  He became nausous and had 1 episode of emesis.  Wife stated he went to bed and when he tried to get up to go to the bathroom, he couldn't, so she called EMS.  She reported that he had been confused after the pain medication and alcohol.  She stated that he had chills while at [facility], but did not take his temperature.  She endorsed the patient has had and increase in cough that is nonproductive. EMS states that the patient had an CBG of 193 and was 91% on RA on arrival.    PMH/PSH/SH/FH/Meds: reviewed.    Symptoms/Review of Systems:  Patient continues to be febrile, Tmax 102.1F. Blood cultures grew Staph Aureus sp. Patient is on 5L/min NC supplemental oxygen. Using BiPAP nightly. No chest pain or headache, fever or abdominal pain.     Diet:  Adequate intake.    Activity level: Normal.    Pain:  Patient reports no pain.       OBJECTIVE:   Vital Signs (Most Recent):      Temp: (!) 102.1 °F (38.9 °C) (05/21/18 0806)  Pulse: 86 (05/21/18 " 0814)  Resp: (!) 22 (05/21/18 0806)  BP: (!) 117/53 (05/21/18 0814)  SpO2: (!) 90 % (05/21/18 0806)       Vital Signs Range (Last 24H):  Temp:  [97.5 °F (36.4 °C)-102.1 °F (38.9 °C)]   Pulse:  [68-91]   Resp:  [16-29]   BP: (117-176)/(53-77)   SpO2:  [90 %-95 %]     Weight: 134.3 kg (296 lb)  Body mass index is 40.14 kg/m².    Intake/Output Summary (Last 24 hours) at 05/21/18 0917  Last data filed at 05/21/18 0600   Gross per 24 hour   Intake             1800 ml   Output              638 ml   Net             1162 ml     Physical Examination:  Constitutional: He is oriented to person, place, and time. He appears well-developed and well-nourished. No distress.   Obese   HENT:   Head: Normocephalic and atraumatic.   Mouth/Throat: Oropharynx is clear and moist.   Eyes: Conjunctivae and EOM are normal. Pupils are equal, round, and reactive to light. No scleral icterus.   Neck: Normal range of motion. Neck supple. No JVD present. No tracheal deviation present. No thyromegaly present.   Cardiovascular: Normal rate, regular rhythm, normal heart sounds and intact distal pulses.  Exam reveals no gallop and no friction rub.    No murmur heard.  Pulses:       Dorsalis pedis pulses are 2+ on the right side, and 2+ on the left side.   Pulmonary/Chest: No accessory muscle usage. No respiratory distress. He has wheezes. He has rhonchi. He has no rales.   Abdominal: Soft. Bowel sounds are normal. He exhibits no distension and no mass. There is no tenderness. There is no guarding.   Pendulous   Musculoskeletal: Normal range of motion. He exhibits no edema, tenderness or deformity.   Neurological: He is alert and oriented to person, place, and time. He has normal strength. He exhibits normal muscle tone. Coordination normal.   Skin: Skin is warm and intact. Capillary refill takes less than 2 seconds. No rash noted. He is diaphoretic. No erythema.   Psychiatric: He has a normal mood and affect. His speech is normal and behavior is  normal. Judgment and thought content normal.   Vitals reviewed.  CRANIAL NERVES      CN III, IV, VI   Pupils are equal, round, and reactive to light.  Extraocular motions are normal.     CBC:    Recent Labs  Lab 05/19/18  0716 05/20/18  0306 05/21/18  0351   WBC 5.80 6.00 6.30   RBC 3.11* 2.99* 2.78*   HGB 10.6* 10.1* 9.5*   HCT 30.8* 29.7* 27.5*   PLT 84* 79* 85*   MCV 99* 100* 99*   MCH 34.0* 33.8* 34.2*   MCHC 34.3 34.0 34.6   BMP    Recent Labs  Lab 05/19/18  0716 05/20/18  0306 05/21/18  0351   * 189* 198*   * 135* 134*   K 3.6 3.9 3.6    101 101   CO2 23 24 24   BUN 20 22 17   CREATININE 1.4 1.3 1.3   CALCIUM 8.3* 8.8 8.9      Diagnostic Results:  Microbiology Results (last 7 days)     Procedure Component Value Units Date/Time    Culture, Respiratory with Gram Stain [[id number]] Collected:  05/20/18 1056    Order Status:  Completed Specimen:  Respiratory from Sputum Updated:  05/20/18 2309     Gram Stain (Respiratory) <10 epithelial cells per low power field.     Gram Stain (Respiratory) Many WBC's     Gram Stain (Respiratory) Rare Gram positive cocci    Blood culture [[id number]] Collected:  05/19/18 1356    Order Status:  Completed Specimen:  Blood Updated:  05/20/18 2235     Blood Culture, Routine Gram stain aer bottle: Gram positive cocci in clusters resembling Staph      Blood Culture, Routine Results called to and read back by: Vivian Castillo RN  05/20/2018  22:34    Narrative:       Blood cultures x 2    Blood culture [[id number]] Collected:  05/19/18 1346    Order Status:  Completed Specimen:  Blood Updated:  05/20/18 1940     Blood Culture, Routine Gram stain aer bottle: Gram positive cocci in clusters resembling Staph      Blood Culture, Routine Results called to and read back by: Radha Mendoza RN  05/20/2018  19:40    Narrative:       X 2 sets    Blood culture [[id number]] Collected:  05/17/18 1945    Order Status:  Completed Specimen:  Blood Updated:  05/20/18 1310     Blood Culture,  Routine Gram stain aer bottle: Gram positive cocci in clusters resembling Staph      Blood Culture, Routine Gram stain sharon bottle: Gram positive cocci in clusters resembling Staph      Blood Culture, Routine Results called to and read back by: Renny Hu RN 05/18/2018       Blood Culture, Routine 17:32     Blood Culture, Routine --     STAPHYLOCOCCUS AUREUS  ID consult required at [facility].  For susceptibility see order #[id number]      Blood culture [[id number]]  (Susceptibility) Collected:  05/17/18 1940    Order Status:  Completed Specimen:  Blood from Antecubital, Right  Arm Updated:  05/20/18 1032     Blood Culture, Routine Gram stain aer bottle: Gram positive cocci in clusters resembling Staph      Blood Culture, Routine Gram stain sharon bottle: Gram positive cocci in clusters resembling Staph      Blood Culture, Routine Results called to and read back by: Renny Hu RN 05/18/2018       Blood Culture, Routine 17:32     Blood Culture, Routine --     STAPHYLOCOCCUS AUREUS  ID consult required at [facility] and [facility] locations.      Urine culture [[id number]] Collected:  05/18/18 1130    Order Status:  Completed Specimen:  Urine from Urine, Clean Catch Updated:  05/19/18 2147     Urine Culture, Routine No growth         CXR: New left lower lobe pneumonia.  Cardiomegaly.  Prominence of the pulmonary vasculature may represent borderline heart function.    CXR: Congestive heart failure with mild to moderate pulmonary edema.  Additional dense infiltrate in the left lower lobe consistent with pneumonia.    CXR: Resolving left lower lobe pneumonia.  Continued moderate pulmonary edema and cardiomegaly consistent with congestive heart failure.    CT chest without contrast:  Patchy bilateral upper lobe interstitial infiltrates.  The upper lobe predominance suggest sarcoidosis or a rather atypical infectious process.  Cavitation, pleural thickening or other evidence of  tuberculosis is not seen.  Small right pleural effusion and trace left pleural effusion is noted with moderate atelectasis at both lung bases.  Consolidation at the left lung base is not seen.  Mild cardiomegaly.  Coronary artery calcification.    Assessment/Plan:     Acute hypoxemic respiratory failure  Community acquired pneumonia of left lower lobe of lung  Staph Aureus Pneumonia, left lower lobe  Staph Aureus Bacteremia  Supplemental O2 via nasal canula; titrate O2 saturation to >92%. Use BiPAP as needed.  Follow pulmonary recommendations.   Continue beta 2 agonist bronchodilator treatments.   Continue IV antibiotics - Vancomycin and Zosyn. Pharmacist to dose Vancomycin. Follow ID recommendations.   Check sputum GS and Cx.   Continue routine medications as before.   Follow whole body scan.  Follow 2 D ECHO.                                                   Type 2 diabetes mellitus with hyperglycemia       Chronic problem.  Uncontrolled hyperglycemia upon admission labs.    Check blood glucose level q AC/HS.  Use Novolog Insulin Sliding Scale as needed.   Continue American Diabetic Association 1800 Kcal diet.            UTI            Follow urine C/S. Continue iV antibiotics.                   ZACHARY (acute kidney injury)       Improved on IVF's.  IVF hydration.  Follow renal panel and electrolytes closely.  Adjust renal dose medications for Estimated Creatinine Clearance: 66.9 mL/min (based on SCr of 1.4 mg/dL).  Avoid nonessential nephrotoxic agents.           Tobacco abuse       Dangers of cigarette smoking were reviewed with patient in detail for 3 minutes and patient was encouraged to quit. Nicotine replacement options were discussed - option to use Nicoderm.           Alcohol use       Reportedly drinks etoh daily without history of DT's  Nursing to perform CIWA assessment, monitoring closesly for DT's (tachycardia, diaphoresis, hypertension, and aggitation)  Will utilize multivitamins  Educated on the  deleterious effects of alcohol use  Seizure precautions with prn ativan   Fall precautions           HTN (hypertension)       Chronic problem.  Labile.  Continue home antihypertensive regimen and monitor b/p closely.  Adjust medications as necessary.           CAD (coronary artery disease)       Historical diagnosis with stent placement.  Did not present with s/s of ACS.  Will continue home BB, ASA, and Statin therapy.  Cardiac monitoring  Monitor closely for s/s of ACS.           Paroxysmal atrial fibrillation       S/P cardioversion.  Has been in NSR since. Start Lovenox 1 mg/kg sq q 12 hrs. Patient requesting second opinion cardiologist for long-term anticoagulation. Usually follows with Dr. Hurst. We discussed NOAC. Patientrefusing Coumadin.  Continue home BB, Amiodarone.  Cardiac monitoring.        Discussed with multiple family members - daughter and wife. Time spent in care of the patient, counseling and coordination of care (Greater than 50% spent in direct face to face contact): 35 min.    VTE Risk Mitigation         Ordered     enoxaparin injection 40 mg  Daily      05/17/18 2248     IP VTE LOW RISK PATIENT  Once      05/17/18 2248        Erik Granados MD  Department of Hospital Medicine   [facility]

## 2018-05-21 NOTE — PLAN OF CARE
"Problem: Patient Care Overview  Goal: Plan of Care Review  Outcome: Ongoing (interventions implemented as appropriate)  Plan of care reviewed with patient and spouse. Wife staying at bedside, attentive to care. Safety maintained throughout shift. Bed in low and locked position, side rails up x2, call light within reach. No falls/injuries during shift. IV antibiotics infused per orders. Blood glucose monitored per orders. Seizure precautions maintained. O2 in use. Bipap in use at bedtime. Continuous pulse ox in use. Telemetry in place. No c/o pain. Pt exhibited signs of anxiety, medication was offered, patient's wife refused. She stated she "did not want anything given that would decrease his O2 sat". Hourly rounding utilized. Will continue to monitor.       "

## 2018-05-21 NOTE — PROGRESS NOTES
ELIEZER Serrano NP notified of elevated temperatures. No new orders at this time. Will continue to monitor.

## 2018-05-21 NOTE — CONSULTS
Jun Stuart [id number] is a 72 y.o. male who had been consulted for vancomycin dosing.    Vancomycin has been discontinued.  Pharmacy consult for vancomycin dosing in no longer required.      Thank you for allowing us to participate in this patient's care.     Jaden Lopez, PharmD

## 2018-05-21 NOTE — PLAN OF CARE
Problem: Patient Care Overview  Goal: Plan of Care Review  Outcome: Ongoing (interventions implemented as appropriate)  Pt remained free from injury through shift. Ambulates without difficulty. IV abx infused. o2 @ 4-5l/min with o2 sats >90%. Tolerating meals. BS monitored. Seizure precautions maintained. NSR per tele monitor. lovenox for vte prevention. Orders obtained for mag citrate as pt stated he is constipated, nurse carried out. Pt awaiting bm. Call light in reach. Family @  Bedside. Nurse hourly rounded to ensure pt safety.

## 2018-05-21 NOTE — PLAN OF CARE
Receives aerosol tx q4     05/21/18 0743   Patient Assessment/Suction   Level of Consciousness (AVPU) alert   Respiratory Effort Normal   Expansion/Accessory Muscles/Retractions expansion symmetric   All Lung Fields Breath Sounds coarse   ODIN Breath Sounds diminished   LLL Breath Sounds diminished   RUL Breath Sounds diminished   RML Breath Sounds diminished   RLL Breath Sounds diminished   Rhythm/Pattern, Respiratory shallow   Cough Frequency infrequent   Cough Type productive   PRE-TX-O2-ETCO2   O2 Device (Oxygen Therapy) nasal cannula   $ Is the patient on Low Flow Oxygen? Yes   Flow (L/min) 5   SpO2 (!) 90 %   Pulse Oximetry Type Continuous   $ Pulse Oximetry - Multiple Charge Pulse Oximetry - Multiple   Pulse 79   Resp 18   Aerosol Therapy   $ Aerosol Therapy Charges Aerosol Treatment   Respiratory Treatment Status given   SVN/Inhaler Treatment Route mask   Position During Treatment HOB at 45 degrees   Patient Tolerance good   Post-Treatment   Post-treatment Heart Rate (beats/min) 81   Post-treatment Resp Rate (breaths/min) 21   All Fields Breath Sounds unchanged

## 2018-05-21 NOTE — PLAN OF CARE
Problem: Patient Care Overview  Goal: Plan of Care Review  Outcome: Ongoing (interventions implemented as appropriate)  Awake, alert, and oriented. Patient voiding no difficulty. IV antibiotics infused per order. VS stable. O2 continuously monitored. Remains afebrile throughout shift. Comfort level established. Moderate pain control w/ PRN pain medication. Bed in low position, brakes locked, side rails up x 2, call light w/in reach. Verbalized understanding of POC. Open communication facilitated. Will continue to monitor.

## 2018-05-22 PROBLEM — G47.33 OBSTRUCTIVE SLEEP APNEA: Status: ACTIVE | Noted: 2018-05-22

## 2018-05-22 PROBLEM — J44.0 COPD WITH ACUTE LOWER RESPIRATORY INFECTION: Status: ACTIVE | Noted: 2018-05-22

## 2018-05-22 LAB
ANION GAP SERPL CALC-SCNC: 11 MMOL/L
BACTERIA BLD CULT: NORMAL
BACTERIA SPEC AEROBE CULT: NORMAL
BASOPHILS # BLD AUTO: 0 K/UL
BASOPHILS NFR BLD: 0.4 %
BUN SERPL-MCNC: 18 MG/DL
CALCIUM SERPL-MCNC: 9.3 MG/DL
CHLORIDE SERPL-SCNC: 99 MMOL/L
CO2 SERPL-SCNC: 26 MMOL/L
CREAT SERPL-MCNC: 1.3 MG/DL
DIFFERENTIAL METHOD: ABNORMAL
EOSINOPHIL # BLD AUTO: 0 K/UL
EOSINOPHIL NFR BLD: 0.1 %
ERYTHROCYTE [DISTWIDTH] IN BLOOD BY AUTOMATED COUNT: 15.3 %
EST. GFR  (AFRICAN AMERICAN): >60 ML/MIN/1.73 M^2
EST. GFR  (NON AFRICAN AMERICAN): 55 ML/MIN/1.73 M^2
GLUCOSE SERPL-MCNC: 185 MG/DL
GRAM STN SPEC: NORMAL
HCT VFR BLD AUTO: 29.5 %
HGB BLD-MCNC: 9.9 G/DL
LYMPHOCYTES # BLD AUTO: 0.6 K/UL
LYMPHOCYTES NFR BLD: 9.7 %
MCH RBC QN AUTO: 33.5 PG
MCHC RBC AUTO-ENTMCNC: 33.5 G/DL
MCV RBC AUTO: 100 FL
MONOCYTES # BLD AUTO: 0.6 K/UL
MONOCYTES NFR BLD: 8.8 %
NEUTROPHILS # BLD AUTO: 5.1 K/UL
NEUTROPHILS NFR BLD: 81 %
PLATELET # BLD AUTO: 94 K/UL
PMV BLD AUTO: 9.8 FL
POCT GLUCOSE: 160 MG/DL (ref 70–110)
POCT GLUCOSE: 199 MG/DL (ref 70–110)
POCT GLUCOSE: 205 MG/DL (ref 70–110)
POCT GLUCOSE: 219 MG/DL (ref 70–110)
POTASSIUM SERPL-SCNC: 3.7 MMOL/L
RBC # BLD AUTO: 2.95 M/UL
SODIUM SERPL-SCNC: 136 MMOL/L
WBC # BLD AUTO: 6.4 K/UL

## 2018-05-22 PROCEDURE — 63600175 PHARM REV CODE 636 W HCPCS: Performed by: EMERGENCY MEDICINE

## 2018-05-22 PROCEDURE — 12000002 HC ACUTE/MED SURGE SEMI-PRIVATE ROOM

## 2018-05-22 PROCEDURE — 94799 UNLISTED PULMONARY SVC/PX: CPT

## 2018-05-22 PROCEDURE — 85025 COMPLETE CBC W/AUTO DIFF WBC: CPT

## 2018-05-22 PROCEDURE — 63600175 PHARM REV CODE 636 W HCPCS: Performed by: INTERNAL MEDICINE

## 2018-05-22 PROCEDURE — 25000003 PHARM REV CODE 250: Performed by: EMERGENCY MEDICINE

## 2018-05-22 PROCEDURE — S4991 NICOTINE PATCH NONLEGEND: HCPCS | Performed by: NURSE PRACTITIONER

## 2018-05-22 PROCEDURE — 87186 SC STD MICRODIL/AGAR DIL: CPT

## 2018-05-22 PROCEDURE — 27000221 HC OXYGEN, UP TO 24 HOURS

## 2018-05-22 PROCEDURE — 99232 SBSQ HOSP IP/OBS MODERATE 35: CPT | Mod: ,,, | Performed by: INTERNAL MEDICINE

## 2018-05-22 PROCEDURE — 99900035 HC TECH TIME PER 15 MIN (STAT)

## 2018-05-22 PROCEDURE — 87040 BLOOD CULTURE FOR BACTERIA: CPT

## 2018-05-22 PROCEDURE — 25000003 PHARM REV CODE 250: Performed by: INTERNAL MEDICINE

## 2018-05-22 PROCEDURE — 94640 AIRWAY INHALATION TREATMENT: CPT

## 2018-05-22 PROCEDURE — 97162 PT EVAL MOD COMPLEX 30 MIN: CPT

## 2018-05-22 PROCEDURE — 97116 GAIT TRAINING THERAPY: CPT

## 2018-05-22 PROCEDURE — 25000242 PHARM REV CODE 250 ALT 637 W/ HCPCS: Performed by: NURSE PRACTITIONER

## 2018-05-22 PROCEDURE — 80048 BASIC METABOLIC PNL TOTAL CA: CPT

## 2018-05-22 PROCEDURE — 25000003 PHARM REV CODE 250: Performed by: NURSE PRACTITIONER

## 2018-05-22 PROCEDURE — 87077 CULTURE AEROBIC IDENTIFY: CPT

## 2018-05-22 PROCEDURE — 94660 CPAP INITIATION&MGMT: CPT

## 2018-05-22 PROCEDURE — 99233 SBSQ HOSP IP/OBS HIGH 50: CPT | Mod: ,,, | Performed by: INTERNAL MEDICINE

## 2018-05-22 PROCEDURE — 94761 N-INVAS EAR/PLS OXIMETRY MLT: CPT

## 2018-05-22 RX ADMIN — IPRATROPIUM BROMIDE AND ALBUTEROL SULFATE 3 ML: .5; 3 SOLUTION RESPIRATORY (INHALATION) at 12:05

## 2018-05-22 RX ADMIN — GABAPENTIN 300 MG: 300 CAPSULE ORAL at 03:05

## 2018-05-22 RX ADMIN — IPRATROPIUM BROMIDE AND ALBUTEROL SULFATE 3 ML: .5; 3 SOLUTION RESPIRATORY (INHALATION) at 07:05

## 2018-05-22 RX ADMIN — CEFAZOLIN SODIUM 2 G: 2 SOLUTION INTRAVENOUS at 02:05

## 2018-05-22 RX ADMIN — IPRATROPIUM BROMIDE AND ALBUTEROL SULFATE 3 ML: .5; 3 SOLUTION RESPIRATORY (INHALATION) at 11:05

## 2018-05-22 RX ADMIN — ASPIRIN 325 MG ORAL TABLET 325 MG: 325 PILL ORAL at 08:05

## 2018-05-22 RX ADMIN — CEFAZOLIN SODIUM 2 G: 2 SOLUTION INTRAVENOUS at 06:05

## 2018-05-22 RX ADMIN — PANTOPRAZOLE SODIUM 40 MG: 40 TABLET, DELAYED RELEASE ORAL at 08:05

## 2018-05-22 RX ADMIN — AMIODARONE HYDROCHLORIDE 200 MG: 200 TABLET ORAL at 08:05

## 2018-05-22 RX ADMIN — ENOXAPARIN SODIUM 140 MG: 150 INJECTION SUBCUTANEOUS at 05:05

## 2018-05-22 RX ADMIN — GABAPENTIN 300 MG: 300 CAPSULE ORAL at 08:05

## 2018-05-22 RX ADMIN — METOPROLOL SUCCINATE 25 MG: 25 TABLET, EXTENDED RELEASE ORAL at 08:05

## 2018-05-22 RX ADMIN — TRAMADOL HYDROCHLORIDE 50 MG: 50 TABLET, FILM COATED ORAL at 06:05

## 2018-05-22 RX ADMIN — LIDOCAINE 1 PATCH: 50 PATCH TOPICAL at 04:05

## 2018-05-22 RX ADMIN — IPRATROPIUM BROMIDE AND ALBUTEROL SULFATE 3 ML: .5; 3 SOLUTION RESPIRATORY (INHALATION) at 03:05

## 2018-05-22 RX ADMIN — BENAZEPRIL HYDROCHLORIDE 40 MG: 10 TABLET, FILM COATED ORAL at 08:05

## 2018-05-22 RX ADMIN — THIAMINE HCL TAB 100 MG 100 MG: 100 TAB at 08:05

## 2018-05-22 RX ADMIN — GABAPENTIN 300 MG: 300 CAPSULE ORAL at 09:05

## 2018-05-22 RX ADMIN — CEFAZOLIN SODIUM 2 G: 2 SOLUTION INTRAVENOUS at 09:05

## 2018-05-22 RX ADMIN — CHLORDIAZEPOXIDE HYDROCHLORIDE 10 MG: 10 CAPSULE ORAL at 05:05

## 2018-05-22 RX ADMIN — INSULIN ASPART 2 UNITS: 100 INJECTION, SOLUTION INTRAVENOUS; SUBCUTANEOUS at 12:05

## 2018-05-22 RX ADMIN — ATORVASTATIN CALCIUM 40 MG: 40 TABLET, FILM COATED ORAL at 08:05

## 2018-05-22 RX ADMIN — ACETAMINOPHEN 650 MG: 325 TABLET, FILM COATED ORAL at 03:05

## 2018-05-22 RX ADMIN — AMLODIPINE BESYLATE 10 MG: 5 TABLET ORAL at 08:05

## 2018-05-22 RX ADMIN — NICOTINE 1 PATCH: 14 PATCH, EXTENDED RELEASE TRANSDERMAL at 08:05

## 2018-05-22 RX ADMIN — CHLORDIAZEPOXIDE HYDROCHLORIDE 10 MG: 10 CAPSULE ORAL at 09:05

## 2018-05-22 RX ADMIN — MONTELUKAST SODIUM 10 MG: 10 TABLET, FILM COATED ORAL at 09:05

## 2018-05-22 RX ADMIN — CHLORDIAZEPOXIDE HYDROCHLORIDE 10 MG: 10 CAPSULE ORAL at 02:05

## 2018-05-22 RX ADMIN — LORAZEPAM 1 MG: 2 INJECTION, SOLUTION INTRAMUSCULAR; INTRAVENOUS at 09:05

## 2018-05-22 RX ADMIN — Medication 1 TABLET: at 08:05

## 2018-05-22 RX ADMIN — TAMSULOSIN HYDROCHLORIDE 0.4 MG: 0.4 CAPSULE ORAL at 08:05

## 2018-05-22 NOTE — PROGRESS NOTES
Progress Note  PULMONARY  2  Admit Date: 5/17/2018 05/22/2018      SUBJECTIVE:     May 22, alert, uses hospital bipap but dislikes home units in past.  No home o2, usually good function.  Having pleuritic chest pain rather than back pain that ppt admission. Pt was smoker with no chr wheeze/cough/zayas.      PFSH and Allergies reviewed.    OBJECTIVE:     Vitals (Most recent):  Vitals:    05/22/18 1609   BP: (!) 180/81   Pulse: 78   Resp: 20   Temp: 98.5 °F (36.9 °C)       Vitals (24 hour range):  Temp:  [98.2 °F (36.8 °C)-101.3 °F (38.5 °C)]   Pulse:  [67-82]   Resp:  [16-22]   BP: (147-186)/(66-84)   SpO2:  [90 %-99 %]       Intake/Output Summary (Last 24 hours) at 05/22/18 1725  Last data filed at 05/22/18 1500   Gross per 24 hour   Intake             1540 ml   Output                4 ml   Net             1536 ml          Physical Exam:  The patient's neuro status (alertness,orientation,cognitive function,motor skills,), pharyngeal exam (oral lesions, hygiene, abn dentition,), Neck (jvd,mass,thyroid,nodes in neck and above/below clavicle),RESPIRATORY(symmetry,effort,fremitus,percussion,auscultation),  Cor(rhythm,heart tones including gallops,perfusion,edema)ABD(distention,hepatic&splenomegaly,tenderness,masses), Skin(rash,cyanosis),Psyc(affect,judgement,).  Exam negative except for these pertinent findings:    Morbid obese, M4, edema legs +2 brawney. Decreased bs, no distress on high flow ox. Symmetric,nl fremitus/percussion    Myocardial Findings     Left Ventricle ejection fraction at 65%. Normal left ventricle diastolic function. Normal left atrial pressure.      Right Ventricle Normal ejection fraction.      Left Atrium Cavity is severely dilated            Radiographs reviewed: view by direct vision  cxr worsened from admit 5/18 to 5/20, ct with patchy gg infiltrates bilat with pleural eff /thickening.   Results for orders placed during the hospital encounter of 05/17/18   X-Ray Chest 1 View    Narrative  EXAMINATION:  XR CHEST 1 VIEW    CLINICAL HISTORY:  pneumonia; Lobar pneumonia, unspecified organism    TECHNIQUE:  Single frontal view of the chest was performed.    COMPARISON:  Chest of 19 May 2018.    FINDINGS:  There is mild cardiomegaly.  There is continued moderate pulmonary edema.  There is decreased density at the left lower lobe consistent with resolving pneumonia.      Impression Resolving left lower lobe pneumonia.  Continued moderate pulmonary edema and cardiomegaly consistent with congestive heart failure.      Electronically signed by: Renny Cuevas MD  Date:    05/20/2018  Time:    08:35   ]Impression       Patchy bilateral upper lobe interstitial infiltrates.  The upper lobe predominance suggest sarcoidosis or a rather atypical infectious process.  Cavitation, pleural thickening or other evidence of tuberculosis is not seen.  Small right pleural effusion and trace left pleural effusion is noted with moderate atelectasis at both lung bases.  Consolidation at the left lung base is not seen.  Mild cardiomegaly.  Coronary artery calcification.      Electronically signed by: Renny Cuevas MD  Date: 05/20/2018  Time: 10:35         Labs       Recent Labs  Lab 05/22/18  0453   WBC 6.40   HGB 9.9*   HCT 29.5*   PLT 94*       Recent Labs  Lab 05/22/18  0453      K 3.7   CL 99   CO2 26   BUN 18   CREATININE 1.3   *   CALCIUM 9.3   No results for input(s): PH, PCO2, PO2, HCO3 in the last 24 hours.  Microbiology Results (last 7 days)     Procedure Component Value Units Date/Time    Blood culture [[id number]] Collected:  05/19/18 1356    Order Status:  Completed Specimen:  Blood Updated:  05/22/18 1454     Blood Culture, Routine Gram stain aer bottle: Gram positive cocci in clusters resembling Staph      Blood Culture, Routine Results called to and read back by: Vivian Castillo RN  05/20/2018  22:34     Blood Culture, Routine --     STAPHYLOCOCCUS AUREUS  ID consult required at [facility]  [facility].  For susceptibility see order #[id number]      Narrative:       Blood cultures x 2    Blood culture [[id number]]  (Susceptibility) Collected:  05/19/18 1346    Order Status:  Completed Specimen:  Blood Updated:  05/22/18 1454     Blood Culture, Routine Gram stain aer bottle: Gram positive cocci in clusters resembling Staph      Blood Culture, Routine Results called to and read back by: Radha Mendoza RN  05/20/2018  19:40     Blood Culture, Routine --     STAPHYLOCOCCUS AUREUS  ID consult required at [facility] and [facility] locations.      Narrative:       X 2 sets    Culture, Respiratory with Gram Stain [[id number]] Collected:  05/20/18 1056    Order Status:  Completed Specimen:  Respiratory from Sputum Updated:  05/22/18 1255     Respiratory Culture Normal respiratory sharron     Gram Stain (Respiratory) <10 epithelial cells per low power field.     Gram Stain (Respiratory) Many WBC's     Gram Stain (Respiratory) Rare Gram positive cocci    Blood culture [[id number]] Collected:  05/22/18 0453    Order Status:  Sent Specimen:  Blood from Antecubital, Right  Arm Updated:  05/22/18 1008    Blood culture [[id number]] Collected:  05/17/18 1945    Order Status:  Completed Specimen:  Blood Updated:  05/20/18 1310     Blood Culture, Routine Gram stain aer bottle: Gram positive cocci in clusters resembling Staph      Blood Culture, Routine Gram stain sharon bottle: Gram positive cocci in clusters resembling Staph      Blood Culture, Routine Results called to and read back by: Renny Hu RN 05/18/2018       Blood Culture, Routine 17:32     Blood Culture, Routine --     STAPHYLOCOCCUS AUREUS  ID consult required at [facility].  For susceptibility see order #[id number]      Blood culture [[id number]]  (Susceptibility) Collected:  05/17/18 1940    Order Status:  Completed Specimen:  Blood from Antecubital, Right  Arm Updated:  05/20/18 1032     Blood Culture, Routine Gram  stain aer bottle: Gram positive cocci in clusters resembling Staph      Blood Culture, Routine Gram stain sharon bottle: Gram positive cocci in clusters resembling Staph      Blood Culture, Routine Results called to and read back by: Renny Hu RN 05/18/2018       Blood Culture, Routine 17:32     Blood Culture, Routine --     STAPHYLOCOCCUS AUREUS  ID consult required at [facility] access hospital,[place] and [facility] locations.      Urine culture [[id number]] Collected:  05/18/18 1130    Order Status:  Completed Specimen:  Urine from Urine, Clean Catch Updated:  05/19/18 2147     Urine Culture, Routine No growth      Results for MEKA MEYER (MRN [id number]) as of 5/22/2018 17:18   Ref. Range 5/19/2018 00:04 5/19/2018 01:27   POC PH Latest Ref Range: 7.35 - 7.45  7.410 7.482 (H)   POC PCO2 Latest Ref Range: 35 - 45 mmHg 35.9 33.3 (L)   POC PO2 Latest Ref Range: 80 - 100 mmHg 41 (LL) 219 (H)       Impression:  Active Hospital Problems    Diagnosis  POA    *Acute hypoxemic respiratory failure [J96.01]  Yes    Obstructive sleep apnea [G47.33]  Yes    COPD with acute lower respiratory infection [J44.0]  Yes    Alcohol use [Z78.9]  Yes    Tobacco abuse [Z72.0]  Yes    ZACHARY (acute kidney injury) [N17.9]  Yes    Community acquired pneumonia of left lower lobe of lung [J18.1]  Yes    Severe sepsis [A41.9, R65.20]  Yes    Paroxysmal atrial fibrillation [I48.0]  Yes    CAD (coronary artery disease) [I25.10]  Yes    HTN (hypertension) [I10]  Yes    Type 2 diabetes mellitus with hyperglycemia [E11.65]  Yes      Resolved Hospital Problems    Diagnosis Date Resolved POA   No resolved problems to display.               Plan:     May 22, will f/u cxr.  Should clear resp failure with rx staph.  Copd  And osas and cor pulmonale may play role?  Echo suggest mitral dz/left heart problem?                                    .

## 2018-05-22 NOTE — PROGRESS NOTES
"Progress Note  Hospital Medicine  Patient Name:Jun Stuart  MRN:  [id number]  Patient Class: IP- Inpatient  Admit Date: 5/17/2018  Length of Stay: 5 days  Expected Discharge Date:   Attending Physician: Erik Granados MD  Primary Care Provider:  Juanjo Park MD    SUBJECTIVE:     Principal Problem: Acute hypoxemic respiratory failure  Initial history of present illness: Jun Stuart is a 72 y.o. Male with PMHx significant for afib, CAD, GERD, HTN, DM and HLD.  He is admitted to the service of hospital medicine with severe sepsis and acute hypoxemic respiratory failure. He presented to the ED via EMS for further evaluation of confusion.  His wife stated that  this am the patient awoke, he began complaining of upper back pain. He was brought to [facility], where the wife states that patient had an "x-ray and EKG." She states that the patient was then given pain medication and discharged. The patient took the pain medication and then drank alcohol.  He became nausous and had 1 episode of emesis.  Wife stated he went to bed and when he tried to get up to go to the bathroom, he couldn't, so she called EMS.  She reported that he had been confused after the pain medication and alcohol.  She stated that he had chills while at [facility], but did not take his temperature.  She endorsed the patient has had and increase in cough that is nonproductive. EMS states that the patient had an CBG of 193 and was 91% on RA on arrival.    PMH/PSH/SH/FH/Meds: reviewed.    Symptoms/Review of Systems:  Patient continues to be febrile, Tmax 102.1F. Blood cultures grew Staph Aureus sp. Patient is on 5L/min NC supplemental oxygen. Using BiPAP nightly. No chest pain or headache, fever or abdominal pain.     Diet:  Adequate intake.    Activity level: Normal.    Pain:  Patient reports no pain.       OBJECTIVE:   Vital Signs (Most Recent):      Temp: 99.2 °F (37.3 °C) (05/22/18 0749)  Pulse: 77 (05/22/18 0749)  Resp: 18 " (05/22/18 0749)  BP: (!) 167/73 (05/22/18 0749)  SpO2: (!) 93 % (05/22/18 0749)       Vital Signs Range (Last 24H):  Temp:  [99.1 °F (37.3 °C)-101.3 °F (38.5 °C)]   Pulse:  [65-82]   Resp:  [16-22]   BP: (164-197)/(72-84)   SpO2:  [90 %-96 %]     Weight: 134.3 kg (296 lb)  Body mass index is 40.14 kg/m².    Intake/Output Summary (Last 24 hours) at 05/22/18 0926  Last data filed at 05/22/18 0600   Gross per 24 hour   Intake             1440 ml   Output                4 ml   Net             1436 ml     Physical Examination:  Constitutional: He is oriented to person, place, and time. He appears well-developed and well-nourished. No distress.   Obese   HENT:   Head: Normocephalic and atraumatic.   Mouth/Throat: Oropharynx is clear and moist.   Eyes: Conjunctivae and EOM are normal. Pupils are equal, round, and reactive to light. No scleral icterus.   Neck: Normal range of motion. Neck supple. No JVD present. No tracheal deviation present. No thyromegaly present.   Cardiovascular: Normal rate, regular rhythm, normal heart sounds and intact distal pulses.  Exam reveals no gallop and no friction rub.    No murmur heard.  Pulses:       Dorsalis pedis pulses are 2+ on the right side, and 2+ on the left side.   Pulmonary/Chest: No accessory muscle usage. No respiratory distress. He has wheezes. He has rhonchi. He has no rales.   Abdominal: Soft. Bowel sounds are normal. He exhibits no distension and no mass. There is no tenderness. There is no guarding.   Pendulous   Musculoskeletal: Normal range of motion. He exhibits no edema, tenderness or deformity.   Neurological: He is alert and oriented to person, place, and time. He has normal strength. He exhibits normal muscle tone. Coordination normal.   Skin: Skin is warm and intact. Capillary refill takes less than 2 seconds. No rash noted. He is diaphoretic. No erythema.   Psychiatric: He has a normal mood and affect. His speech is normal and behavior is normal. Judgment and  thought content normal.   Vitals reviewed.  CRANIAL NERVES   CN III, IV, VI   Pupils are equal, round, and reactive to light.  Extraocular motions are normal.     CBC:    Recent Labs  Lab 05/20/18  0306 05/21/18 0351 05/22/18 0453   WBC 6.00 6.30 6.40   RBC 2.99* 2.78* 2.95*   HGB 10.1* 9.5* 9.9*   HCT 29.7* 27.5* 29.5*   PLT 79* 85* 94*   * 99* 100*   MCH 33.8* 34.2* 33.5*   MCHC 34.0 34.6 33.5   BMP    Recent Labs  Lab 05/20/18 0306 05/21/18 0351 05/22/18 0453   * 198* 185*   * 134* 136   K 3.9 3.6 3.7    101 99   CO2 24 24 26   BUN 22 17 18   CREATININE 1.3 1.3 1.3   CALCIUM 8.8 8.9 9.3      Diagnostic Results:  Microbiology Results (last 7 days)     Procedure Component Value Units Date/Time    Blood culture [[id number]] Collected:  05/22/18 0453    Order Status:  Sent Specimen:  Blood Updated:  05/22/18 0453    Blood culture [[id number]] Collected:  05/19/18 1356    Order Status:  Completed Specimen:  Blood Updated:  05/21/18 1006     Blood Culture, Routine Gram stain aer bottle: Gram positive cocci in clusters resembling Staph      Blood Culture, Routine Results called to and read back by: Vivian Castillo RN  05/20/2018  22:34     Blood Culture, Routine --     STAPHYLOCOCCUS AUREUS  ID consult required at [facility].  For susceptibility see order #[id number]      Narrative:       Blood cultures x 2    Blood culture [[id number]] Collected:  05/19/18 1346    Order Status:  Completed Specimen:  Blood Updated:  05/21/18 1004     Blood Culture, Routine Gram stain aer bottle: Gram positive cocci in clusters resembling Staph      Blood Culture, Routine Results called to and read back by: Radha Mendoza RN  05/20/2018  19:40     Blood Culture, Routine --     STAPHYLOCOCCUS AUREUS  Susceptibility pending  [facility]      Narrative:       X 2 sets    Culture, Respiratory with Gram Stain [[id number]] Collected:   05/20/18 1056    Order Status:  Completed Specimen:  Respiratory from Sputum Updated:  05/20/18 2309     Gram Stain (Respiratory) <10 epithelial cells per low power field.     Gram Stain (Respiratory) Many WBC's     Gram Stain (Respiratory) Rare Gram positive cocci    Blood culture [[id number]] Collected:  05/17/18 1945    Order Status:  Completed Specimen:  Blood Updated:  05/20/18 1310     Blood Culture, Routine Gram stain aer bottle: Gram positive cocci in clusters resembling Staph      Blood Culture, Routine Gram stain sharon bottle: Gram positive cocci in clusters resembling Staph      Blood Culture, Routine Results called to and read back by: Renny Hu RN 05/18/2018       Blood Culture, Routine 17:32     Blood Culture, Routine --     STAPHYLOCOCCUS AUREUS  ID consult required at [facility].  For susceptibility see order #[id number]      Blood culture [[id number]]  (Susceptibility) Collected:  05/17/18 1940    Order Status:  Completed Specimen:  Blood from Antecubital, Right  Arm Updated:  05/20/18 1032     Blood Culture, Routine Gram stain aer bottle: Gram positive cocci in clusters resembling Staph      Blood Culture, Routine Gram stain sharon bottle: Gram positive cocci in clusters resembling Staph      Blood Culture, Routine Results called to and read back by: Renny Hu RN 05/18/2018       Blood Culture, Routine 17:32     Blood Culture, Routine --     STAPHYLOCOCCUS AUREUS  ID consult required at [facility] locations.      Urine culture [[id number]] Collected:  05/18/18 1130    Order Status:  Completed Specimen:  Urine from Urine, Clean Catch Updated:  05/19/18 2147     Urine Culture, Routine No growth         CXR: New left lower lobe pneumonia.  Cardiomegaly.  Prominence of the pulmonary vasculature may represent borderline heart function.    CXR: Congestive heart failure with mild to moderate pulmonary edema.  Additional dense infiltrate in the  left lower lobe consistent with pneumonia.    CXR: Resolving left lower lobe pneumonia.  Continued moderate pulmonary edema and cardiomegaly consistent with congestive heart failure.    CT chest without contrast:  Patchy bilateral upper lobe interstitial infiltrates.  The upper lobe predominance suggest sarcoidosis or a rather atypical infectious process.  Cavitation, pleural thickening or other evidence of tuberculosis is not seen.  Small right pleural effusion and trace left pleural effusion is noted with moderate atelectasis at both lung bases.  Consolidation at the left lung base is not seen.  Mild cardiomegaly.  Coronary artery calcification.    WBC tagged scan: There is no scintigraphic evidence of metastatic disease or osseous destruction.    ECHO:  · Left atrium is severely dilated.  · LVEF appears to be normal      Performing Sonographer     Vanita Guzman    Reason For Exam   Priority: Routine   Dx: Acute respiratory failure with hypoxemia [J96.01 (ICD-10-CM)]      Vitals     Height Weight BMI (Calculated) BSA (Calculated - sq m) BP   6' (1.829 m) 134.3 kg (296 lb) 40.2 2.61 sq meters 167/73       Study Details     A complete echo was performed using spectral Doppler. Overall the study quality was poor. The study had technical difficulties. The study was difficult due to patient's body habitus.   Myocardial Findings     Left Ventricle ejection fraction at 65%. Normal left ventricle diastolic function. Normal left atrial pressure.      Right Ventricle Normal ejection fraction.      Left Atrium Cavity is severely dilated.          Assessment/Plan:     Acute hypoxemic respiratory failure  Community acquired pneumonia of left lower lobe of lung  Staph Aureus Pneumonia, left lower lobe  Staph Aureus Bacteremia  Supplemental O2 via nasal canula; titrate O2 saturation to >92%. Use BiPAP as needed.  Follow pulmonary recommendations.   Continue beta 2 agonist bronchodilator treatments.   Continue IV antibiotics -  Vancomycin and Zosyn. Pharmacist to dose Vancomycin. Follow ID recommendations.   Check sputum GS and Cx.   Continue routine medications as before.   Whole body scan results reviewed.  2 D ECHO results reviewed which is sub-optimal. Discussed with Dr. SALVATORE Montes for EDUAR, patient's respiratory status not staus for EDUAR at this time, may require anesthesiologist assistance.   Follow T-spine MRI results.  Patient to have CT abdomen and pelvis in am.                                                    Type 2 diabetes mellitus with hyperglycemia       Chronic problem.  Uncontrolled hyperglycemia upon admission labs.    Check blood glucose level q AC/HS.  Use Novolog Insulin Sliding Scale as needed.   Continue American Diabetic Association 1800 Kcal diet.            UTI            Follow urine C/S. Continue IV antibiotics.                   ZACHARY (acute kidney injury)       Improved on IVF's.  IVF hydration.  Follow renal panel and electrolytes closely.  Adjust renal dose medications for Estimated Creatinine Clearance: 66.9 mL/min (based on SCr of 1.4 mg/dL).  Avoid nonessential nephrotoxic agents.           Tobacco abuse       Dangers of cigarette smoking were reviewed with patient in detail for 3 minutes and patient was encouraged to quit. Nicotine replacement options were discussed - option to use Nicoderm.           Alcohol use       Reportedly drinks etoh daily without history of DT's  Nursing to perform CIWA assessment, monitoring closesly for DT's (tachycardia, diaphoresis, hypertension, and aggitation)  Will utilize multivitamins  Educated on the deleterious effects of alcohol use  Seizure precautions with prn ativan   Fall precautions           HTN (hypertension)       Chronic problem.  Labile.  Continue home antihypertensive regimen and monitor b/p closely.  Adjust medications as necessary.           CAD (coronary artery disease)       Historical diagnosis with stent placement.  Did not present with s/s of ACS.  Will  continue home BB, ASA, and Statin therapy.  Cardiac monitoring  Monitor closely for s/s of ACS.           Paroxysmal atrial fibrillation       S/P cardioversion.  Has been in NSR since. On Lovenox 1 mg/kg sq q 12 hrs.   Continue home BB, Amiodarone.  Cardiac monitoring.        Discussed with daughter and wife. They have multiple questions. Time spent in care of the patient, counseling and coordination of care (Greater than 50% spent in direct face to face contact): 35 min.    VTE Risk Mitigation         Ordered     enoxaparin injection 140 mg  Every 12 hours (non-standard times)      05/21/18 1623     IP VTE LOW RISK PATIENT  Once      05/17/18 4804        Erik Granados MD  Department of Hospital Medicine   [facility]

## 2018-05-22 NOTE — PLAN OF CARE
Problem: Patient Care Overview  Goal: Individualization & Mutuality  AAOx4. Up with assist to restroom using walker. Pt up in chair majority of shift. IV antibiotics infused per order. VSS. 02-7L in use. Cont pulse ox in use. IS encouraged throughout shift. Patient remains fall free throughout my shift. Comfort level established. Good pain control with prn medications. Bed low, brakes locked, SR up x2, call light within reach. Will continue to monitor.

## 2018-05-22 NOTE — PT/OT/SLP EVAL
"Physical Therapy Evaluation    Patient Name:  Jun Stuart   MRN:  [id number]    Recommendations:     Discharge Recommendations:   (OP pulmonary rehab)   Discharge Equipment Recommendations: none   Barriers to discharge: None    Assessment:     Jun Stuart is a 72 y.o. male admitted with a medical diagnosis of Acute hypoxemic respiratory failure.  He presents with the following impairments/functional limitations:  weakness, impaired endurance, impaired self care skills, impaired functional mobilty, gait instability, impaired cardiopulmonary response to activity . Pt has excellent family support and did well mobilizing to hallways on o2  At 8 liters..    Rehab Prognosis:  fair; patient would benefit from acute skilled PT services to address these deficits and reach maximum level of function.      Recent Surgery: * No surgery found *      Plan:     During this hospitalization, patient to be seen 6 x/week to address the above listed problems via gait training, therapeutic activities, therapeutic exercises  · Plan of Care Expires:  06/08/18   Plan of Care Reviewed with: patient, spouse, daughter    Subjective     Communicated with nurse Dale prior to session.  Patient found seated at the bedside chair upon PT entry to room, agreeable to evaluation.    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  Patient has the following equipment:  .  DME owned (not currently used): rolling walker.  Upon discharge, patient will have assistance from family.    Objective:     Patient " found with: pulse ox (continuous), peripheral IV, oxygen     General Precautions: Standard, fall, respiratory   Orthopedic Precautions:N/A   Braces: N/A     Exams:  · RLE ROM: WFL  · RLE Strength: WFL  · LLE ROM: WFL  · LLE Strength: WFL    Functional Mobility:  · Transfers:     · Sit to Stand:  minimum assistance with rolling walker  · Gait: 250ft with several rest stands with RW, O2 at 8 liters with SAT 91%    AM-PAC 6 CLICK MOBILITY  Total Score:16       Therapeutic Activities and Exercises:   pt has been in chair most of day  Pt educated on correct breathing techniques  Active movements of LE's encouraged    Patient left up in chair with all lines intact and call button in reach.    GOALS:    Physical Therapy Goals        Problem: Physical Therapy Goal    Goal Priority Disciplines Outcome Goal Variances Interventions   Physical Therapy Goal     PT/OT, PT Ongoing (interventions implemented as appropriate)     Description:  Goals to be met by: 2018     Patient will increase functional independence with mobility by performin. Sit to stand transfer with Contact Guard Assistance  2. Bed to chair transfer with Contact Guard Assistance using Rolling Walker  3. Gait  x 250x2 feet with Contact Guard Assistance using Rolling Walker.   4. Lower extremity exercise program x20 reps per handout, with assistance as needed                      History:     Past Medical History:   Diagnosis Date    Atrial fibrillation     Colon polyp     Coronary artery disease     Diabetes mellitus     GERD (gastroesophageal reflux disease)     Hyperlipidemia     Hypertension        Past Surgical History:   Procedure Laterality Date    cardioversion      JOINT REPLACEMENT      VASECTOMY         Clinical Decision Making:     History  Co-morbidities and personal factors that may impact the plan of care Examination  Body Structures and Functions, activity limitations and participation restrictions that may impact the  plan of care Clinical Presentation   Decision Making/ Complexity Score   Co-morbidities:   [] Time since onset of injury / illness / exacerbation  [] Status of current condition  []Patient's cognitive status and safety concerns    [] Multiple Medical Problems (see med hx)  Personal Factors:   [] Patient's age  [] Prior Level of function   [] Patient's home situation (environment and family support)  [] Patient's level of motivation  [] Expected progression of patient      HISTORY:(criteria)    [] 92750 - no personal factors/history    [] 48235 - has 1-2 personal factor/comorbidity     [] 79459 - has >3 personal factor/comorbidity     Body Regions:  [] Objective examination findings  [] Head     []  Neck  [] Trunk   [] Upper Extremity  [] Lower Extremity    Body Systems:  [] For communication ability, affect, cognition, language, and learning style: the assessment of the ability to make needs known, consciousness, orientation (person, place, and time), expected emotional /behavioral responses, and learning preferences (eg, learning barriers, education  needs)  [] For the neuromuscular system: a general assessment of gross coordinated movement (eg, balance, gait, locomotion, transfers, and transitions) and motor function  (motor control and motor learning)  [] For the musculoskeletal system: the assessment of gross symmetry, gross range of motion, gross strength, height, and weight  [] For the integumentary system: the assessment of pliability(texture), presence of scar formation, skin color, and skin integrity  [] For cardiovascular/pulmonary system: the assessment of heart rate, respiratory rate, blood pressure, and edema     Activity limitations:    [] Patient's cognitive status and saf ety concerns          [] Status of current condition      [] Weight bearing restriction  [] Cardiopulmunary Restriction    Participation Restrictions:   [] Goals and goal agreement with the patient     [] Rehab potential (prognosis)  and probable outcome      Examination of Body System: (criteria)    [] 91692 - addressing 1-2 elements    [] 31513 - addressing a total of 3 or more elements     [] 82827 -  Addressing a total of 4 or more elements         Clinical Presentation: (criteria)  Choose one     On examination of body system using standardized tests and measures patient presents with (CHOOSE ONE) elements from any of the following: body structures and functions, activity limitations, and/or participation restrictions.  Leading to a clinical presentation that is considered (CHOOSE ONE)                              Clinical Decision Making  (Eval Complexity):  Choose One     Time Tracking:     PT Received On: 05/22/18  PT Start Time: 1424     PT Stop Time: 1452  PT Total Time (min): 28 min     Billable Minutes: Evaluation 10 and Gait Training 18      Joycelyn Fuentes, PT  05/22/2018

## 2018-05-22 NOTE — PLAN OF CARE
05/21/18 1929   Patient Assessment/Suction   Level of Consciousness (AVPU) alert   OIDN Breath Sounds diminished   LLL Breath Sounds diminished   RUL Breath Sounds diminished   RML Breath Sounds diminished   RLL Breath Sounds diminished   PRE-TX-O2-ETCO2   O2 Device (Oxygen Therapy) nasal cannula   $ Is the patient on Low Flow Oxygen? Yes   Flow (L/min) 5   SpO2 (!) 91 %   Pulse Oximetry Type Continuous   $ Pulse Oximetry - Multiple Charge Pulse Oximetry - Multiple   Pulse 75   Resp (!) 22   Aerosol Therapy   $ Aerosol Therapy Charges Aerosol Treatment   Respiratory Treatment Status given   SVN/Inhaler Treatment Route with oxygen;mask   Position During Treatment Sitting in chair   Patient Tolerance good   Post-Treatment   Post-treatment Heart Rate (beats/min) 77   Post-treatment Resp Rate (breaths/min) 20   All Fields Breath Sounds unchanged   Incentive Spirometer   $ Incentive Spirometer Charges done with encouragement   Administration (Incentive Spirometer) done with encouragement   Number of Repetitions (Incentive Spirometer) 6   Level (mL) (Incentive Spirometer) 1500   Patient Tolerance good   Preset CPAP/BiPAP Settings   Mode Of Delivery Standby

## 2018-05-22 NOTE — PROGRESS NOTES
05/22/18 1130   Patient Assessment/Suction   Level of Consciousness (AVPU) alert   Respiratory Effort Normal;Unlabored   Expansion/Accessory Muscles/Retractions no retractions;no use of accessory muscles   All Lung Fields Breath Sounds diminished   Cough Frequency infrequent   Cough Type nonproductive;good   PRE-TX-O2-ETCO2   O2 Device (Oxygen Therapy) nasal cannula   Flow (L/min) 8   Oxygen Concentration (%) 40   SpO2 95 %   Pulse Oximetry Type Intermittent   Pulse 81   Resp 16   Aerosol Therapy   $ Aerosol Therapy Charges Aerosol Treatment   Respiratory Treatment Status given   SVN/Inhaler Treatment Route mask   Position During Treatment Sitting in chair   Patient Tolerance good   Post-Treatment   Post-treatment Heart Rate (beats/min) 79   Post-treatment Resp Rate (breaths/min) 16   All Fields Breath Sounds unchanged   Ready to Wean/Extubation Screen   FIO2<=50 (chart decimal) 0.4   Preset CPAP/BiPAP Settings   Mode Of Delivery BiPAP;other (see comments)  (pt. place on bipap at this time for mask fitting for patient)   Airway Device Type (mask fitting done for comfort of patient and educated )

## 2018-05-22 NOTE — PLAN OF CARE
Problem: Physical Therapy Goal  Goal: Physical Therapy Goal  Goals to be met by: 2018     Patient will increase functional independence with mobility by performin. Sit to stand transfer with Contact Guard Assistance  2. Bed to chair transfer with Contact Guard Assistance using Rolling Walker  3. Gait  x 250x2 feet with Contact Guard Assistance using Rolling Walker.   4. Lower extremity exercise program x20 reps per handout, with assistance as needed    Outcome: Ongoing (interventions implemented as appropriate)  PT eval and treat completed. Gait at hallways 250ft with RW/O2 at 8 liters with SAT 91%

## 2018-05-22 NOTE — PLAN OF CARE
05/22/18 0721   Patient Assessment/Suction   Level of Consciousness (AVPU) alert   Respiratory Effort Unlabored;Normal   Expansion/Accessory Muscles/Retractions no retractions;no use of accessory muscles   All Lung Fields Breath Sounds diminished;clear   Cough Frequency infrequent   Cough Type nonproductive   PRE-TX-O2-ETCO2   O2 Device (Oxygen Therapy) nasal cannula   $ Is the patient on Low Flow Oxygen? Yes   Flow (L/min) 8   Oxygen Concentration (%) 40   SpO2 95 %   Pulse Oximetry Type Intermittent   $ Pulse Oximetry - Multiple Charge Pulse Oximetry - Multiple   Pulse 74   Resp 16   Aerosol Therapy   $ Aerosol Therapy Charges Aerosol Treatment   Respiratory Treatment Status given   SVN/Inhaler Treatment Route mask   Position During Treatment Sitting in chair   Patient Tolerance good   Post-Treatment   Post-treatment Heart Rate (beats/min) 76   Post-treatment Resp Rate (breaths/min) 20   All Fields Breath Sounds unchanged   Incentive Spirometer   $ Incentive Spirometer Charges done with encouragement   Administration (Incentive Spirometer) done with encouragement   Number of Repetitions (Incentive Spirometer) 8   Level (mL) (Incentive Spirometer) 1500   Patient Tolerance good   Ready to Wean/Extubation Screen   FIO2<=50 (chart decimal) 0.4

## 2018-05-22 NOTE — PLAN OF CARE
Problem: Patient Care Overview  Goal: Plan of Care Review  Outcome: Ongoing (interventions implemented as appropriate)  AAOx4. Plan of care reviewed with patient. Safety maintained throughout shift. Bed in low and locked position, side rails up x2, call light within reach. Repositions independently. Ambulated with use of walker. No falls/injuries during shift. Telemetry monitor intact. O2 in use. Bipap in use at bedtime. Continuous pulse ox in use. Fall precautions maintained. Seizure precautions maintained. Blood glucose monitored per orders. IV antibiotics infused per orders. Hydralazine given for systolic BP >180 per orders. Hourly rounding utilized. Will continue to monitor.

## 2018-05-22 NOTE — CONSULTS
This 72-year-old gentleman was admitted with symptoms of weakness and found to   be septic.  He also had mental status changes, which have also resolved now.    The patient's blood cultures are growing gram-positive cocci.  He still has   temperature elevation on and off.  Cardiology is consulted to address the issue   of anticoagulation in light of his history of PAF and possible EDUAR.  The patient   has a complex medical history.  He has atrial fibrillation on and off for a   while and underwent electrical cardioversion by Dr. Lam few years back.    Since then, he has been in sinus rhythm.  The patient has also a loop recorder   in place.  He sees Dr. Lam for his cardiac clearance.  I have discussed   with them in detail about the pros and cons of anticoagulation, they are   reluctant and want to discuss with Dr. Lam.  I also suggested that we   should have this loop recorder interrogated to see if he remained in and out of   atrial fib or not.  That will also make easier to make the final decision about   long-term anticoagulation.  The patient also has a history of coronary stent   placement few years back.  He has underlying severe COPD.  Currently, he is on a   6 L via cannula with O2 sats of 91%.  He is somewhat tachypneic, sitting in a   chair.    PAST MEDICAL HISTORY:  Includes diabetes, gastroesophageal reflux,   hyperlipidemia, hypertension, mastectomy and joint replacement.    PHYSICAL EXAMINATION:  GENERAL:  Shows a well-built male patient in no acute distress.  VITAL SIGNS:  Temperature now is 99.9, pulse 70 per minute and regular, blood   pressure 160/70 mmHg.  Approximated weight is 296 pounds.  HEENT:  Normocephalic, sclerae nonicteric.  NECK:  Difficult to assess jugular venous pressure.  Carotids without bruit.  CARDIAC:  Regular rhythm.  No audible murmur, gallop or rub.  LUNGS:  With bilateral coarse breath sounds with crackles.  ABDOMEN:  Obese, soft, nontender.  Bowel  sounds present.  EXTREMITIES:  No edema of feet noted.    A 12-lead EKG shows sinus rhythm, generalized nonspecific ST-T changes.    LABORATORY DATA:  WBC 6.30, hemoglobin 9.5, hematocrit 27.2, platelet count is   85. Sodium 134, potassium 3.6, chloride 101, CO2 24,  BUN 17, creatinine 1.3,   glucose 198.  Lactate level was 2.1 and 2.4.    IMPRESSION:  1.  Status post sepsis, positive blood cultures as above.  2.  Pneumonia, exacerbation of chronic obstructive pulmonary disease.  3.  History of atrial fibrillation, currently in sinus rhythm.  4.  Obesity.  5.  Hypertension.  6.  Diabetes mellitus.    From cardiac standpoint, we will have the loop recorder interrogated in the a.m.   to assess AF burden.  At this time, the patient and his wife declined for   anticoagulation by mouth.  For EDUAR, you need to have pulmonary evaluation to   assess his status of the procedure.  Thank you very much, we will follow with   you.            /michaelle [id number] pranav(s)        MAXIM  dd: 05/21/2018 20:58:31 (CDT)  td: 05/21/2018 23:13:40 (CDT)  Doc ID   #[id number]  Job ID #[id number]    CC: Erik Granados M.D.

## 2018-05-22 NOTE — PROGRESS NOTES
Progress Note  Infectious Disease    Follow-up For:  MSSA sepsis    SUBJECTIVE:   ROS:  Still has fever. Blood cultures from 5/19 positive. CT chest and bone scan noted and reviewed with wife, patient. TTE suboptimal to exclude endocarditis. Complains of right subscapular pain. Complains of constipation. Awaiting Mgcitrate to have its effect.     Antibiotics     Start     Stop Route Frequency Ordered    05/21/18 1400  cefazolin (ANCEF) 2 gram in dextrose 5% 50 mL IVPB (premix)      -- IV Every 8 hours (non-standard times) 05/21/18 1245          Pertinent Medications noted:    EXAM & DIAGNOSTICS REVIEWED:   Vitals:     Temp:  [97.5 °F (36.4 °C)-102.1 °F (38.9 °C)]   Temp: 99.9 °F (37.7 °C) (05/21/18 2013)  Pulse: 77 (05/21/18 2148)  Resp: (!) 21 (05/21/18 2148)  BP: (!) 178/84 (05/21/18 2013)  SpO2: 95 % (05/21/18 2148)    Intake/Output Summary (Last 24 hours) at 05/21/18 2210  Last data filed at 05/21/18 1800   Gross per 24 hour   Intake             2200 ml   Output                7 ml   Net             2193 ml       General:  In NAD. Looks fairly comfortable, complaining of back pain and constipation  Eyes:  Anicteric, PERRL, EOMI  ENT:  Mouth w/ pink MMM, no lesions/exudate,    Neck:  Trachea midline, supple, no adenopathy appreciated  Lungs:  Decreased Bs with basilar crackles BLL  Heart:  RRR, no gallop/murmur noted  Abd:  soft, NT, ND, normal BS, no masses/organomegaly appreciated.  :  Voids  Musc:  Joints without effusion, erythema, synovitis, but back pain is greatest inferior to right scapula  Skin:  Generally warm, dry, normal for color. No rashes. No septic skin lesions  Wound:   Neuro: AAOx3, speech clear, moves all extrems equally. Fairly appropriate at present  Extrem: No palpable edema, erythema, phlebitis, cellulitis, synovitis  VAD:      Lines/Tubes/Drains:    General Labs reviewed:    Recent Labs  Lab 05/21/18  0351   WBC 6.30   RBC 2.78*   HGB 9.5*   HCT 27.5*   PLT 85*   MCV 99*   MCH 34.2*    MCHC 34.6       Recent Labs  Lab 05/21/18  0351   CALCIUM 8.9   *   K 3.6   CO2 24      BUN 17   CREATININE 1.3       Micro: MSSA from blood cultures 5/17, 5/19  Sputum culture:  Urine culture negative    Imaging Reviewed: CT Chest  Impression   Patchy bilateral upper lobe interstitial infiltrates.  The upper lobe predominance suggest sarcoidosis or a rather atypical infectious process.  Cavitation, pleural thickening or other evidence of tuberculosis is not seen.  Small right pleural effusion and trace left pleural effusion is noted with moderate atelectasis at both lung bases.  Consolidation at the left lung base is not seen.  Mild cardiomegaly.  Coronary artery calcification.     Bone scan:  Impression   There is no scintigraphic evidence of metastatic disease or osseous destruction..             ASSESSMENT & PLAN      Patient Active Problem List   Diagnosis    BPH (benign prostatic hypertrophy)    Nocturia    Testicular hypofunction    Community acquired pneumonia of left lower lobe of lung    Severe sepsis    Acute hypoxemic respiratory failure    Paroxysmal atrial fibrillation    CAD (coronary artery disease)    HTN (hypertension)    Type 2 diabetes mellitus with hyperglycemia    Alcohol use    Tobacco abuse    ZACHARY (acute kidney injury)   1.         Staph aureus bacteremia(MSSA) source unclear. CT chest looks more like fluid than pneumonia to me  2.         Pulmonary edema, improved, requiring bipap intermittently  3.         Alcohol abuse, with suspect withdrawal, requiring precedex, resolving  4.         Diabetes  5.         TKA right, in situ  6. Mid back pain, negative bone scan, still may represent focus of MSSA seeding    Recommendation:   Serial blood cultures  Ancef 2 g IV 18  EDUAR  MRI thoracic spine tomorrow  CT abd/pelvis 5/23    Discussed at length with wife, friend of family, and patient

## 2018-05-23 LAB
ALLENS TEST: ABNORMAL
ANION GAP SERPL CALC-SCNC: 9 MMOL/L
BASOPHILS # BLD AUTO: 0 K/UL
BASOPHILS NFR BLD: 0 %
BNP SERPL-MCNC: 361 PG/ML
BUN SERPL-MCNC: 19 MG/DL
CALCIUM SERPL-MCNC: 9.6 MG/DL
CHLORIDE SERPL-SCNC: 99 MMOL/L
CO2 SERPL-SCNC: 27 MMOL/L
CREAT SERPL-MCNC: 1.1 MG/DL
DELSYS: ABNORMAL
DIFFERENTIAL METHOD: ABNORMAL
EOSINOPHIL # BLD AUTO: 0 K/UL
EOSINOPHIL NFR BLD: 0.1 %
ERYTHROCYTE [DISTWIDTH] IN BLOOD BY AUTOMATED COUNT: 15.3 %
EST. GFR  (AFRICAN AMERICAN): >60 ML/MIN/1.73 M^2
EST. GFR  (NON AFRICAN AMERICAN): >60 ML/MIN/1.73 M^2
FIO2: 45
GLUCOSE SERPL-MCNC: 162 MG/DL
HCO3 UR-SCNC: 25.4 MMOL/L (ref 24–28)
HCT VFR BLD AUTO: 29.3 %
HGB BLD-MCNC: 9.9 G/DL
LYMPHOCYTES # BLD AUTO: 0.6 K/UL
LYMPHOCYTES NFR BLD: 8.2 %
MCH RBC QN AUTO: 33.6 PG
MCHC RBC AUTO-ENTMCNC: 33.6 G/DL
MCV RBC AUTO: 100 FL
MODE: ABNORMAL
MONOCYTES # BLD AUTO: 0.5 K/UL
MONOCYTES NFR BLD: 7.8 %
NEUTROPHILS # BLD AUTO: 5.7 K/UL
NEUTROPHILS NFR BLD: 83.9 %
PCO2 BLDA: 39.2 MMHG (ref 35–45)
PH SMN: 7.42 [PH] (ref 7.35–7.45)
PLATELET # BLD AUTO: 104 K/UL
PMV BLD AUTO: 10.6 FL
PO2 BLDA: 62 MMHG (ref 80–100)
POC BE: 1 MMOL/L
POC SATURATED O2: 92 % (ref 95–100)
POC TCO2: 27 MMOL/L (ref 23–27)
POCT GLUCOSE: 163 MG/DL (ref 70–110)
POCT GLUCOSE: 172 MG/DL (ref 70–110)
POCT GLUCOSE: 182 MG/DL (ref 70–110)
POTASSIUM SERPL-SCNC: 4.2 MMOL/L
RBC # BLD AUTO: 2.94 M/UL
SAMPLE: ABNORMAL
SITE: ABNORMAL
SODIUM SERPL-SCNC: 135 MMOL/L
WBC # BLD AUTO: 6.7 K/UL

## 2018-05-23 PROCEDURE — 12000002 HC ACUTE/MED SURGE SEMI-PRIVATE ROOM

## 2018-05-23 PROCEDURE — S4991 NICOTINE PATCH NONLEGEND: HCPCS | Performed by: NURSE PRACTITIONER

## 2018-05-23 PROCEDURE — 82803 BLOOD GASES ANY COMBINATION: CPT

## 2018-05-23 PROCEDURE — 83880 ASSAY OF NATRIURETIC PEPTIDE: CPT

## 2018-05-23 PROCEDURE — 63600175 PHARM REV CODE 636 W HCPCS: Performed by: INTERNAL MEDICINE

## 2018-05-23 PROCEDURE — 94761 N-INVAS EAR/PLS OXIMETRY MLT: CPT

## 2018-05-23 PROCEDURE — 94660 CPAP INITIATION&MGMT: CPT

## 2018-05-23 PROCEDURE — 99233 SBSQ HOSP IP/OBS HIGH 50: CPT | Mod: ,,, | Performed by: INTERNAL MEDICINE

## 2018-05-23 PROCEDURE — 25000242 PHARM REV CODE 250 ALT 637 W/ HCPCS: Performed by: NURSE PRACTITIONER

## 2018-05-23 PROCEDURE — 27000221 HC OXYGEN, UP TO 24 HOURS

## 2018-05-23 PROCEDURE — 36600 WITHDRAWAL OF ARTERIAL BLOOD: CPT

## 2018-05-23 PROCEDURE — 36415 COLL VENOUS BLD VENIPUNCTURE: CPT

## 2018-05-23 PROCEDURE — 99900035 HC TECH TIME PER 15 MIN (STAT)

## 2018-05-23 PROCEDURE — 80048 BASIC METABOLIC PNL TOTAL CA: CPT

## 2018-05-23 PROCEDURE — 25000003 PHARM REV CODE 250: Performed by: INTERNAL MEDICINE

## 2018-05-23 PROCEDURE — 25000003 PHARM REV CODE 250: Performed by: NURSE PRACTITIONER

## 2018-05-23 PROCEDURE — 94799 UNLISTED PULMONARY SVC/PX: CPT

## 2018-05-23 PROCEDURE — 94640 AIRWAY INHALATION TREATMENT: CPT

## 2018-05-23 PROCEDURE — 87040 BLOOD CULTURE FOR BACTERIA: CPT

## 2018-05-23 PROCEDURE — 25000003 PHARM REV CODE 250: Performed by: EMERGENCY MEDICINE

## 2018-05-23 PROCEDURE — 85025 COMPLETE CBC W/AUTO DIFF WBC: CPT

## 2018-05-23 PROCEDURE — 25500020 PHARM REV CODE 255

## 2018-05-23 PROCEDURE — 99232 SBSQ HOSP IP/OBS MODERATE 35: CPT | Mod: ,,, | Performed by: INTERNAL MEDICINE

## 2018-05-23 RX ORDER — FUROSEMIDE 10 MG/ML
20 INJECTION INTRAMUSCULAR; INTRAVENOUS ONCE
Status: COMPLETED | OUTPATIENT
Start: 2018-05-23 | End: 2018-05-23

## 2018-05-23 RX ORDER — L. ACIDOPHILUS/L.BULGARICUS 100MM CELL
1 GRANULES IN PACKET (EA) ORAL 2 TIMES DAILY
Status: DISCONTINUED | OUTPATIENT
Start: 2018-05-23 | End: 2018-05-30 | Stop reason: HOSPADM

## 2018-05-23 RX ADMIN — ENOXAPARIN SODIUM 140 MG: 150 INJECTION SUBCUTANEOUS at 06:05

## 2018-05-23 RX ADMIN — AMLODIPINE BESYLATE 10 MG: 5 TABLET ORAL at 09:05

## 2018-05-23 RX ADMIN — ASPIRIN 325 MG ORAL TABLET 325 MG: 325 PILL ORAL at 09:05

## 2018-05-23 RX ADMIN — BENAZEPRIL HYDROCHLORIDE 40 MG: 10 TABLET, FILM COATED ORAL at 09:05

## 2018-05-23 RX ADMIN — IPRATROPIUM BROMIDE AND ALBUTEROL SULFATE 3 ML: .5; 3 SOLUTION RESPIRATORY (INHALATION) at 11:05

## 2018-05-23 RX ADMIN — AMIODARONE HYDROCHLORIDE 200 MG: 200 TABLET ORAL at 09:05

## 2018-05-23 RX ADMIN — CEFAZOLIN SODIUM 2 G: 2 SOLUTION INTRAVENOUS at 07:05

## 2018-05-23 RX ADMIN — IPRATROPIUM BROMIDE AND ALBUTEROL SULFATE 3 ML: .5; 3 SOLUTION RESPIRATORY (INHALATION) at 07:05

## 2018-05-23 RX ADMIN — LORAZEPAM 1 MG: 2 INJECTION, SOLUTION INTRAMUSCULAR; INTRAVENOUS at 01:05

## 2018-05-23 RX ADMIN — THIAMINE HCL TAB 100 MG 100 MG: 100 TAB at 09:05

## 2018-05-23 RX ADMIN — CEFAZOLIN SODIUM 2 G: 2 SOLUTION INTRAVENOUS at 02:05

## 2018-05-23 RX ADMIN — CHLORDIAZEPOXIDE HYDROCHLORIDE 10 MG: 10 CAPSULE ORAL at 10:05

## 2018-05-23 RX ADMIN — IPRATROPIUM BROMIDE AND ALBUTEROL SULFATE 3 ML: .5; 3 SOLUTION RESPIRATORY (INHALATION) at 03:05

## 2018-05-23 RX ADMIN — IPRATROPIUM BROMIDE AND ALBUTEROL SULFATE 3 ML: .5; 3 SOLUTION RESPIRATORY (INHALATION) at 08:05

## 2018-05-23 RX ADMIN — MONTELUKAST SODIUM 10 MG: 10 TABLET, FILM COATED ORAL at 10:05

## 2018-05-23 RX ADMIN — ACETAMINOPHEN 650 MG: 325 TABLET, FILM COATED ORAL at 07:05

## 2018-05-23 RX ADMIN — LACTOBACILLUS ACIDOPHILUS / LACTOBACILLUS BULGARICUS 1 EACH: 100 MILLION CFU STRENGTH GRANULES at 10:05

## 2018-05-23 RX ADMIN — ATORVASTATIN CALCIUM 40 MG: 40 TABLET, FILM COATED ORAL at 09:05

## 2018-05-23 RX ADMIN — Medication 1 TABLET: at 09:05

## 2018-05-23 RX ADMIN — TAMSULOSIN HYDROCHLORIDE 0.4 MG: 0.4 CAPSULE ORAL at 09:05

## 2018-05-23 RX ADMIN — IOHEXOL 30 ML: 350 INJECTION, SOLUTION INTRAVENOUS at 05:05

## 2018-05-23 RX ADMIN — CHLORDIAZEPOXIDE HYDROCHLORIDE 10 MG: 10 CAPSULE ORAL at 07:05

## 2018-05-23 RX ADMIN — PANTOPRAZOLE SODIUM 40 MG: 40 TABLET, DELAYED RELEASE ORAL at 09:05

## 2018-05-23 RX ADMIN — METOPROLOL SUCCINATE 25 MG: 25 TABLET, EXTENDED RELEASE ORAL at 09:05

## 2018-05-23 RX ADMIN — CHLORDIAZEPOXIDE HYDROCHLORIDE 10 MG: 10 CAPSULE ORAL at 02:05

## 2018-05-23 RX ADMIN — IOHEXOL 100 ML: 350 INJECTION, SOLUTION INTRAVENOUS at 08:05

## 2018-05-23 RX ADMIN — CEFAZOLIN SODIUM 2 G: 2 SOLUTION INTRAVENOUS at 10:05

## 2018-05-23 RX ADMIN — NICOTINE 1 PATCH: 14 PATCH, EXTENDED RELEASE TRANSDERMAL at 09:05

## 2018-05-23 RX ADMIN — GABAPENTIN 300 MG: 300 CAPSULE ORAL at 02:05

## 2018-05-23 RX ADMIN — GABAPENTIN 300 MG: 300 CAPSULE ORAL at 09:05

## 2018-05-23 RX ADMIN — ENOXAPARIN SODIUM 140 MG: 150 INJECTION SUBCUTANEOUS at 04:05

## 2018-05-23 RX ADMIN — GABAPENTIN 300 MG: 300 CAPSULE ORAL at 10:05

## 2018-05-23 RX ADMIN — FUROSEMIDE 20 MG: 10 INJECTION, SOLUTION INTRAMUSCULAR; INTRAVENOUS at 04:05

## 2018-05-23 NOTE — PLAN OF CARE
Problem: Patient Care Overview  Goal: Plan of Care Review  Outcome: Ongoing (interventions implemented as appropriate)  Pt sitting up in chair. AAOx3. Dx: PNA. Plan of care reviewed. Questions answered. Verbalized understanding. O2 @ 6L/min per n/c. 2000 ADA diet. Accuchecks AC/HS. No glycemic distress noted. Up w/assist w/wheelchair. No c/o pain or distress at this time. Will cont to monitor.

## 2018-05-23 NOTE — PLAN OF CARE
05/23/18 0717   Patient Assessment/Suction   Level of Consciousness (AVPU) alert   Respiratory Effort Unlabored   Expansion/Accessory Muscles/Retractions no use of accessory muscles;no retractions   All Lung Fields Breath Sounds diminished   Cough Frequency infrequent   Cough Type good;nonproductive   PRE-TX-O2-ETCO2   O2 Device (Oxygen Therapy) High Flow nasal Cannula   $ Is the patient on Low Flow Oxygen? Yes   Flow (L/min) 6   SpO2 (!) 94 %   Pulse Oximetry Type Continuous   $ Pulse Oximetry - Multiple Charge Pulse Oximetry - Multiple   Pulse 76   Resp 20   Aerosol Therapy   $ Aerosol Therapy Charges Aerosol Treatment   Respiratory Treatment Status given   SVN/Inhaler Treatment Route mask   Position During Treatment Sitting in chair   Patient Tolerance good   Post-Treatment   Post-treatment Heart Rate (beats/min) 78   Post-treatment Resp Rate (breaths/min) 20   All Fields Breath Sounds unchanged   Incentive Spirometer   $ Incentive Spirometer Charges done with encouragement   Administration (Incentive Spirometer) done with encouragement   Number of Repetitions (Incentive Spirometer) 10   Level (mL) (Incentive Spirometer) 1750   Patient Tolerance good   Preset CPAP/BiPAP Settings   Mode Of Delivery Standby

## 2018-05-23 NOTE — PLAN OF CARE
Problem: Patient Care Overview  Goal: Individualization & Mutuality  AAOx4. Up with assist to bedside commode. IVF infusing per order. IV antibiotics infused per order. VSS. Remains afebrile throughout my shift. Patient remains fall free throughout my shift. Comfort level established. Good pain control with prn medications. Bed low, brakes locked, SR up x2, call light within reach. Will continue to monitor.

## 2018-05-23 NOTE — PROGRESS NOTES
Progress Note  PULMONARY  2  Admit Date: 5/17/2018 05/23/2018      SUBJECTIVE:     May 22, alert, uses hospital bipap but dislikes home units in past.  No home o2, usually good function.  Having pleuritic chest pain rather   May 23, alert, on 5 lpm sat 94%, confused last pm.  Uses niv.    PFSH and Allergies reviewed.    OBJECTIVE:     Vitals (Most recent):  Vitals:    05/23/18 1546   BP: (!) 152/80   Pulse: 71   Resp: 18   Temp: 98 °F (36.7 °C)       Vitals (24 hour range):  Temp:  [98 °F (36.7 °C)-100.2 °F (37.9 °C)]   Pulse:  [69-84]   Resp:  [16-28]   BP: (151-211)/(70-91)   SpO2:  [92 %-98 %]       Intake/Output Summary (Last 24 hours) at 05/23/18 1638  Last data filed at 05/23/18 1400   Gross per 24 hour   Intake              460 ml   Output                0 ml   Net              460 ml          Physical Exam:  The patient's neuro status (alertness,orientation,cognitive function,motor skills,), pharyngeal exam (oral lesions, hygiene, abn dentition,), Neck (jvd,mass,thyroid,nodes in neck and above/below clavicle),RESPIRATORY(symmetry,effort,fremitus,percussion,auscultation),  Cor(rhythm,heart tones including gallops,perfusion,edema)ABD(distention,hepatic&splenomegaly,tenderness,masses), Skin(rash,cyanosis),Psyc(affect,judgement,).  Exam negative except for these pertinent findings:    Morbid obese, M4, edema legs +2 brawney. Decreased bs, no distress on high flow ox. Symmetric,nl fremitus/percussion    Myocardial Findings     Left Ventricle ejection fraction at 65%. Normal left ventricle diastolic function. Normal left atrial pressure.      Right Ventricle Normal ejection fraction.      Left Atrium Cavity is severely dilated        Results for MEKA MEYER (MRN [id number]) as of 5/23/2018 16:39   Ref. Range 5/23/2018 09:47   POC PH Latest Ref Range: 7.35 - 7.45  7.419   POC PCO2 Latest Ref Range: 35 - 45 mmHg 39.2   POC PO2 Latest Ref Range: 80 - 100 mmHg 62 (L)       Radiographs reviewed: view by direct  vision  cxr worsened from admit 5/18 to 5/20, ct with patchy gg infiltrates bilat with pleural eff /thickening. - cxr 5/22 still left >>right lung edema.  Results for orders placed during the hospital encounter of 05/17/18   X-Ray Chest 1 View    Narrative EXAMINATION:  XR CHEST 1 VIEW    CLINICAL HISTORY:  pneumonia; Lobar pneumonia, unspecified organism    TECHNIQUE:  Single frontal view of the chest was performed.    COMPARISON:  Chest of 19 May 2018.    FINDINGS:  There is mild cardiomegaly.  There is continued moderate pulmonary edema.  There is decreased density at the left lower lobe consistent with resolving pneumonia.      Impression Resolving left lower lobe pneumonia.  Continued moderate pulmonary edema and cardiomegaly consistent with congestive heart failure.      Electronically signed by: Renny Cuevas MD  Date:    05/20/2018  Time:    08:35   ]Impression       Patchy bilateral upper lobe interstitial infiltrates.  The upper lobe predominance suggest sarcoidosis or a rather atypical infectious process.  Cavitation, pleural thickening or other evidence of tuberculosis is not seen.  Small right pleural effusion and trace left pleural effusion is noted with moderate atelectasis at both lung bases.  Consolidation at the left lung base is not seen.  Mild cardiomegaly.  Coronary artery calcification.      Electronically signed by: Renny Cuevas MD  Date: 05/20/2018  Time: 10:35         Labs       Recent Labs  Lab 05/23/18  0635   WBC 6.70   HGB 9.9*   HCT 29.3*   *       Recent Labs  Lab 05/23/18  0635   *   K 4.2   CL 99   CO2 27   BUN 19   CREATININE 1.1   *   CALCIUM 9.6   *       Recent Labs  Lab 05/23/18  0947   PH 7.419   PCO2 39.2   PO2 62*   HCO3 25.4     Microbiology Results (last 7 days)     Procedure Component Value Units Date/Time    Blood culture [[id number]] Collected:  05/22/18 0453    Order Status:  Completed Specimen:  Blood from Antecubital, Right  Arm  Updated:  05/23/18 1131     Blood Culture, Routine Gram stain aer bottle: Gram positive cocci in clusters resembling Staph      Blood Culture, Routine Results called to and read back by:Jigna Rios RN 05/23/2018  11:31    Blood culture [[id number]] Collected:  05/23/18 0635    Order Status:  Sent Specimen:  Blood from Antecubital, Left Updated:  05/23/18 1012    Blood culture [[id number]] Collected:  05/19/18 1356    Order Status:  Completed Specimen:  Blood Updated:  05/22/18 1454     Blood Culture, Routine Gram stain aer bottle: Gram positive cocci in clusters resembling Staph      Blood Culture, Routine Results called to and read back by: Vivian Castillo RN  05/20/2018  22:34     Blood Culture, Routine --     STAPHYLOCOCCUS AUREUS  ID consult required at [facility] locations.  For susceptibility see order #[id number]      Narrative:       Blood cultures x 2    Blood culture [[id number]]  (Susceptibility) Collected:  05/19/18 1346    Order Status:  Completed Specimen:  Blood Updated:  05/22/18 1454     Blood Culture, Routine Gram stain aer bottle: Gram positive cocci in clusters resembling Staph      Blood Culture, Routine Results called to and read back by: Radha Mendoza RN  05/20/2018  19:40     Blood Culture, Routine --     STAPHYLOCOCCUS AUREUS  ID consult required at [facility] and [facility] locations.      Narrative:       X 2 sets    Culture, Respiratory with Gram Stain [[id number]] Collected:  05/20/18 1056    Order Status:  Completed Specimen:  Respiratory from Sputum Updated:  05/22/18 1255     Respiratory Culture Normal respiratory sharron     Gram Stain (Respiratory) <10 epithelial cells per low power field.     Gram Stain (Respiratory) Many WBC's     Gram Stain (Respiratory) Rare Gram positive cocci    Blood culture [[id number]] Collected:  05/17/18 1945    Order Status:  Completed Specimen:  Blood Updated:  05/20/18 1310     Blood Culture, Routine Gram stain aer bottle: Gram positive cocci  in clusters resembling Staph      Blood Culture, Routine Gram stain sharon bottle: Gram positive cocci in clusters resembling Staph      Blood Culture, Routine Results called to and read back by: Renny Hu RN 05/18/2018       Blood Culture, Routine 17:32     Blood Culture, Routine --     STAPHYLOCOCCUS AUREUS  ID consult required at [facility].  For susceptibility see order #[id number]      Blood culture [[id number]]  (Susceptibility) Collected:  05/17/18 1940    Order Status:  Completed Specimen:  Blood from Antecubital, Right  Arm Updated:  05/20/18 1032     Blood Culture, Routine Gram stain aer bottle: Gram positive cocci in clusters resembling Staph      Blood Culture, Routine Gram stain sharon bottle: Gram positive cocci in clusters resembling Staph      Blood Culture, Routine Results called to and read back by: Renny Hu RN 05/18/2018       Blood Culture, Routine 17:32     Blood Culture, Routine --     STAPHYLOCOCCUS AUREUS  ID consult required at [facility] and [facility] locations.      Urine culture [[id number]] Collected:  05/18/18 1130    Order Status:  Completed Specimen:  Urine from Urine, Clean Catch Updated:  05/19/18 2147     Urine Culture, Routine No growth      Results for MEKA MEYER (MRN [id number]) as of 5/22/2018 17:18   Ref. Range 5/19/2018 00:04 5/19/2018 01:27   POC PH Latest Ref Range: 7.35 - 7.45  7.410 7.482 (H)   POC PCO2 Latest Ref Range: 35 - 45 mmHg 35.9 33.3 (L)   POC PO2 Latest Ref Range: 80 - 100 mmHg 41 (LL) 219 (H)       Impression:  Active Hospital Problems    Diagnosis  POA    *Acute hypoxemic respiratory failure [J96.01]  Yes    Obstructive sleep apnea [G47.33]  Yes    COPD with acute lower respiratory infection [J44.0]  Yes    Alcohol use [Z78.9]  Yes    Tobacco abuse [Z72.0]  Yes    ZACHARY (acute kidney injury) [N17.9]  Yes    Community acquired pneumonia of left lower lobe of lung [J18.1]  Yes    Severe sepsis [A41.9,  R65.20]  Yes    Paroxysmal atrial fibrillation [I48.0]  Yes    CAD (coronary artery disease) [I25.10]  Yes    HTN (hypertension) [I10]  Yes    Type 2 diabetes mellitus with hyperglycemia [E11.65]  Yes      Resolved Hospital Problems    Diagnosis Date Resolved POA   No resolved problems to display.               Plan:     May 22, will f/u cxr.  Should clear resp failure with rx staph.  Copd  And osas and cor pulmonale may play role?  Echo suggest mitral dz/left heart problem?    May 23,  cxr sluggish, still left infiltrate, bnp 361-will give 20 lasix ivp x1 .  Adjust niv.                                  .

## 2018-05-23 NOTE — PLAN OF CARE
CM continues to follow and will assist with discharge needs once pt's final needs are determined. Anticipate it will be LTAC vs SNF....Aaron Scherer CM      05/23/18 0953   Discharge Reassessment   Assessment Type Discharge Planning Reassessment

## 2018-05-23 NOTE — PROGRESS NOTES
"Progress Note  Hospital Medicine  Patient Name:Jun Stuart  MRN:  [id number]  Patient Class: IP- Inpatient  Admit Date: 5/17/2018  Length of Stay: 6 days  Expected Discharge Date:   Attending Physician: Erik Granados MD  Primary Care Provider:  Juanjo Park MD    SUBJECTIVE:     Principal Problem: Acute hypoxemic respiratory failure  Initial history of present illness: Jun Stuart is a 72 y.o. Male with PMHx significant for afib, CAD, GERD, HTN, DM and HLD.  He is admitted to the service of hospital medicine with severe sepsis and acute hypoxemic respiratory failure. He presented to the ED via EMS for further evaluation of confusion.  His wife stated that  this am the patient awoke, he began complaining of upper back pain. He was brought to [facility], where the wife states that patient had an "x-ray and EKG." She states that the patient was then given pain medication and discharged. The patient took the pain medication and then drank alcohol.  He became nausous and had 1 episode of emesis.  Wife stated he went to bed and when he tried to get up to go to the bathroom, he couldn't, so she called EMS.  She reported that he had been confused after the pain medication and alcohol.  She stated that he had chills while at [facility], but did not take his temperature.  She endorsed the patient has had and increase in cough that is nonproductive. EMS states that the patient had an CBG of 193 and was 91% on RA on arrival.    PMH/PSH/SH/FH/Meds: reviewed.    Symptoms/Review of Systems:  Tmax 100.2 F. Sitting in chair. Blood cultures grew Staph Aureus sp. Patient is on 8L/min NC supplemental oxygen. Using BiPAP nightly. Patient could not get MRI T-spine due to body habitus. No chest pain or headache, fever or abdominal pain.     Diet:  Adequate intake.    Activity level: Up with assistance   Pain:  Patient reports no pain.       OBJECTIVE:   Vital Signs (Most Recent):      Temp: 98 °F (36.7 °C) " (05/23/18 0512)  Pulse: 76 (05/23/18 0717)  Resp: 20 (05/23/18 0717)  BP: (!) 172/77 (05/23/18 0512)  SpO2: (!) 94 % (05/23/18 0717)       Vital Signs Range (Last 24H):  Temp:  [98 °F (36.7 °C)-100.2 °F (37.9 °C)]   Pulse:  [67-84]   Resp:  [16-28]   BP: (147-195)/(66-83)   SpO2:  [91 %-99 %]     Weight: 134.3 kg (296 lb)  Body mass index is 40.14 kg/m².    Intake/Output Summary (Last 24 hours) at 05/23/18 0904  Last data filed at 05/22/18 1500   Gross per 24 hour   Intake              460 ml   Output                0 ml   Net              460 ml     Physical Examination:  Constitutional: He is oriented to person, place, and time. He appears well-developed and well-nourished. No distress.   Obese   HENT:   Head: Normocephalic and atraumatic.   Mouth/Throat: Oropharynx is clear and moist.   Eyes: Conjunctivae and EOM are normal. Pupils are equal, round, and reactive to light. No scleral icterus.   Neck: Normal range of motion. Neck supple. No JVD present. No tracheal deviation present. No thyromegaly present.   Cardiovascular: Normal rate, regular rhythm, normal heart sounds and intact distal pulses.  Exam reveals no gallop and no friction rub.    No murmur heard.  Pulses:       Dorsalis pedis pulses are 2+ on the right side, and 2+ on the left side.   Pulmonary/Chest: No accessory muscle usage. No respiratory distress. He has rhonchi. He has no rales.   Abdominal: Soft. Bowel sounds are normal. He exhibits no distension and no mass. There is no tenderness. There is no guarding.   Musculoskeletal: Normal range of motion. He exhibits no edema, tenderness or deformity.   Neurological: He is alert and oriented to person, place, and time. He has normal strength. He exhibits normal muscle tone. Coordination normal.   Skin: Skin is warm and intact. Capillary refill takes less than 2 seconds. No rash noted.   Psychiatric: He has a normal mood and affect. His speech is normal and behavior is normal. Judgment and thought  content normal.   Vitals reviewed.  CRANIAL NERVES   CN III, IV, VI   Pupils are equal, round, and reactive to light.  Extraocular motions are normal.     CBC:    Recent Labs  Lab 05/21/18 0351 05/22/18 0453 05/23/18  0635   WBC 6.30 6.40 6.70   RBC 2.78* 2.95* 2.94*   HGB 9.5* 9.9* 9.9*   HCT 27.5* 29.5* 29.3*   PLT 85* 94* 104*   MCV 99* 100* 100*   MCH 34.2* 33.5* 33.6*   MCHC 34.6 33.5 33.6   BMP    Recent Labs  Lab 05/21/18 0351 05/22/18 0453 05/23/18  0635   * 185* 162*   * 136 135*   K 3.6 3.7 4.2    99 99   CO2 24 26 27   BUN 17 18 19   CREATININE 1.3 1.3 1.1   CALCIUM 8.9 9.3 9.6      Diagnostic Results:  Microbiology Results (last 7 days)     Procedure Component Value Units Date/Time    Blood culture [[id number]] Collected:  05/23/18 0635    Order Status:  Sent Specimen:  Blood Updated:  05/23/18 0635    Blood culture [[id number]] Collected:  05/22/18 0453    Order Status:  Completed Specimen:  Blood from Antecubital, Right  Arm Updated:  05/22/18 1915     Blood Culture, Routine No Growth to date    Blood culture [[id number]] Collected:  05/19/18 1356    Order Status:  Completed Specimen:  Blood Updated:  05/22/18 1454     Blood Culture, Routine Gram stain aer bottle: Gram positive cocci in clusters resembling Staph      Blood Culture, Routine Results called to and read back by: Vivian Castillo RN  05/20/2018  22:34     Blood Culture, Routine --     STAPHYLOCOCCUS AUREUS  ID consult required at [facility].  For susceptibility see order #[id number]      Narrative:       Blood cultures x 2    Blood culture [[id number]]  (Susceptibility) Collected:  05/19/18 1346    Order Status:  Completed Specimen:  Blood Updated:  05/22/18 1454     Blood Culture, Routine Gram stain aer bottle: Gram positive cocci in clusters resembling Staph      Blood Culture, Routine Results called to and read back by: Radha Mendoza RN  05/20/2018  19:40     Blood Culture, Routine --      STAPHYLOCOCCUS AUREUS  ID consult required at [facility].      Narrative:       X 2 sets    Culture, Respiratory with Gram Stain [[id number]] Collected:  05/20/18 1056    Order Status:  Completed Specimen:  Respiratory from Sputum Updated:  05/22/18 1255     Respiratory Culture Normal respiratory sharron     Gram Stain (Respiratory) <10 epithelial cells per low power field.     Gram Stain (Respiratory) Many WBC's     Gram Stain (Respiratory) Rare Gram positive cocci    Blood culture [[id number]] Collected:  05/17/18 1945    Order Status:  Completed Specimen:  Blood Updated:  05/20/18 1310     Blood Culture, Routine Gram stain aer bottle: Gram positive cocci in clusters resembling Staph      Blood Culture, Routine Gram stain sharon bottle: Gram positive cocci in clusters resembling Staph      Blood Culture, Routine Results called to and read back by: Renny Hu RN 05/18/2018       Blood Culture, Routine 17:32     Blood Culture, Routine --     STAPHYLOCOCCUS AUREUS  ID consult required at [facility].  For susceptibility see order #[id number]      Blood culture [[id number]]  (Susceptibility) Collected:  05/17/18 1940    Order Status:  Completed Specimen:  Blood from Antecubital, Right  Arm Updated:  05/20/18 1032     Blood Culture, Routine Gram stain aer bottle: Gram positive cocci in clusters resembling Staph      Blood Culture, Routine Gram stain sharon bottle: Gram positive cocci in clusters resembling Staph      Blood Culture, Routine Results called to and read back by: Renny Hu RN 05/18/2018       Blood Culture, Routine 17:32     Blood Culture, Routine --     STAPHYLOCOCCUS AUREUS  ID consult required at [facility].      Urine culture [[id number]] Collected:  05/18/18 1130    Order Status:  Completed Specimen:  Urine from Urine, Clean Catch Updated:  05/19/18 2147     Urine Culture, Routine No growth         CXR: New  left lower lobe pneumonia.  Cardiomegaly.  Prominence of the pulmonary vasculature may represent borderline heart function.    CXR: Congestive heart failure with mild to moderate pulmonary edema.  Additional dense infiltrate in the left lower lobe consistent with pneumonia.    CXR: Resolving left lower lobe pneumonia.  Continued moderate pulmonary edema and cardiomegaly consistent with congestive heart failure.    CT chest without contrast:  Patchy bilateral upper lobe interstitial infiltrates.  The upper lobe predominance suggest sarcoidosis or a rather atypical infectious process.  Cavitation, pleural thickening or other evidence of tuberculosis is not seen.  Small right pleural effusion and trace left pleural effusion is noted with moderate atelectasis at both lung bases.  Consolidation at the left lung base is not seen.  Mild cardiomegaly.  Coronary artery calcification.    Bone scan: There is no scintigraphic evidence of metastatic disease or osseous destruction.    ECHO:  · Left atrium is severely dilated.  · LVEF appears to be normal      Performing Sonographer     Vanita Guzman    Reason For Exam   Priority: Routine   Dx: Acute respiratory failure with hypoxemia [J96.01 (ICD-10-CM)]      Vitals     Height Weight BMI (Calculated) BSA (Calculated - sq m) BP   6' (1.829 m) 134.3 kg (296 lb) 40.2 2.61 sq meters 167/73       Study Details     A complete echo was performed using spectral Doppler. Overall the study quality was poor. The study had technical difficulties. The study was difficult due to patient's body habitus.   Myocardial Findings     Left Ventricle ejection fraction at 65%. Normal left ventricle diastolic function. Normal left atrial pressure.      Right Ventricle Normal ejection fraction.      Left Atrium Cavity is severely dilated.          Assessment/Plan:     Acute hypoxemic respiratory failure  Community acquired pneumonia of left lower lobe of lung  Staph Aureus Pneumonia, left lower  lobe  Staph Aureus Bacteremia  Supplemental O2 via nasal canula; titrate O2 saturation to >92%. Use BiPAP as needed.  Follow pulmonary recommendations.   Continue beta 2 agonist bronchodilator treatments.   Continue IV antibiotics - Vancomycin and Zosyn. Pharmacist to dose Vancomycin. Follow ID recommendations. Patient would need 6 weeks of IV antibiotics.   Check sputum GS and Cx.   Continue routine medications as before.   Whole body scan results reviewed.  2 D ECHO results reviewed which is sub-optimal. Discussed with Dr. SALVATORE Montes for EDUAR, patient's respiratory status not staus for EDUAR at this time, may require anesthesiologist assistance.  Patient could not get MRI of back due to body habitus.   Follow CT abdomen and pelvis results.                                                   Type 2 diabetes mellitus with hyperglycemia       Chronic problem.  Uncontrolled hyperglycemia upon admission labs.    Check blood glucose level q AC/HS.  Use Novolog Insulin Sliding Scale as needed.   Continue American Diabetic Association 1800 Kcal diet.            UTI            Follow urine C/S. Continue IV antibiotics.                   ZACHARY (acute kidney injury)       Improved on IVF's.  IVF hydration.  Follow renal panel and electrolytes closely.  Adjust renal dose medications for Estimated Creatinine Clearance: 66.9 mL/min (based on SCr of 1.4 mg/dL).  Avoid nonessential nephrotoxic agents.           Tobacco abuse       Dangers of cigarette smoking were reviewed with patient in detail for 3 minutes and patient was encouraged to quit. Nicotine replacement options were discussed - option to use Nicoderm.           Alcohol use       Reportedly drinks etoh daily without history of DT's  Nursing to perform CIWA assessment, monitoring closesly for DT's (tachycardia, diaphoresis, hypertension, and aggitation)  Will utilize multivitamins  Educated on the deleterious effects of alcohol use  Seizure precautions with prn ativan   Fall  precautions           HTN (hypertension)       Chronic problem.  Labile.  Continue home antihypertensive regimen and monitor b/p closely.  Adjust medications as necessary.           CAD (coronary artery disease)       Historical diagnosis with stent placement.  Did not present with s/s of ACS.  Will continue home BB, ASA, and Statin therapy.  Cardiac monitoring  Monitor closely for s/s of ACS.           Paroxysmal atrial fibrillation       S/P cardioversion.  Has been in NSR since. On Lovenox 1 mg/kg sq q 12 hrs.   Continue home BB, Amiodarone.  Cardiac monitoring.        Discussed with daughter, brother and wife. They have multiple questions. Time spent in care of the patient, counseling and coordination of care (Greater than 50% spent in direct face to face contact): 35 min.    VTE Risk Mitigation         Ordered     enoxaparin injection 140 mg  Every 12 hours (non-standard times)      05/21/18 1623     IP VTE LOW RISK PATIENT  Once      05/17/18 1574        Erik Granados MD  Department of Hospital Medicine   [facility]

## 2018-05-23 NOTE — PT/OT/SLP PROGRESS
Physical Therapy Treatment    Patient Name:  Jun Stuart   MRN:  [id number]    Recommendations:     Discharge Recommendations:   (OP Pulmo Rehab)   Discharge Equipment Recommendations: none   Barriers to discharge: None    Assessment:     Jun Stuart is a 72 y.o. male admitted with a medical diagnosis of Acute hypoxemic respiratory failure.  He presents with the following impairments/functional limitations:  weakness, impaired endurance, impaired self care skills, impaired functional mobilty, gait instability, impaired cardiopulmonary response to activity, with SOB after gait even with O2 without distress; SPO2 after gait training is 99% with HR at 72 bpm; no new complaints after Tx.    Rehab Prognosis:  good; patient would benefit from acute skilled PT services to address these deficits and reach maximum level of function.      Recent Surgery: * No surgery found *      Plan:     During this hospitalization, patient to be seen 6 x/week to address the above listed problems via gait training, therapeutic activities, therapeutic exercises  · Plan of Care Expires:  06/08/18   Plan of Care Reviewed with: patient, spouse, daughter    Subjective     Communicated with TRINI Dale prior to session.  Patient found sitting in the chair upon PT entry to room, agreeable to treatment.      Chief Complaint: SOB with activities  Patient comments/goals: would like to walk in the hallway  Pain/Comfort:  · Pain Rating 1: 0/10    Patients cultural, spiritual, Hindu conflicts given the current situation: none    Objective:     Patient found with: pulse ox (continuous), peripheral IV, oxygen     General Precautions: Standard, fall, respiratory   Orthopedic Precautions:N/A   Braces: N/A     Functional Mobility:  · Transfers:     · Sit to Stand:  contact guard assistance with rolling walker  · Gait: 250 ft with a RW with cont O2 at 8L/min per NC with portable O2 in tow    AM-PAC 6 CLICK MOBILITY  Turning over in bed (including  adjusting bedclothes, sheets and blankets)?: 3  Sitting down on and standing up from a chair with arms (e.g., wheelchair, bedside commode, etc.): 4  Moving from lying on back to sitting on the side of the bed?: 3  Moving to and from a bed to a chair (including a wheelchair)?: 3  Need to walk in hospital room?: 3  Climbing 3-5 steps with a railing?: 1  Total Score: 17     Patient left up in chair with all lines intact, call button in reach, RN notified and daughter and wife present..    GOALS:    Physical Therapy Goals        Problem: Physical Therapy Goal    Goal Priority Disciplines Outcome Goal Variances Interventions   Physical Therapy Goal     PT/OT, PT Ongoing (interventions implemented as appropriate)     Description:  Goals to be met by: 2018     Patient will increase functional independence with mobility by performin. Sit to stand transfer with Contact Guard Assistance  2. Bed to chair transfer with Contact Guard Assistance using Rolling Walker  3. Gait  x 250x2 feet with Contact Guard Assistance using Rolling Walker.   4. Lower extremity exercise program x20 reps per handout, with assistance as needed                      Time Tracking:     PT Received On: 18  PT Start Time: 1554     PT Stop Time: 1621  PT Total Time (min): 27 min     Billable Minutes: Gait Training 18 and Therapeutic Activity 9    Treatment Type: Treatment  PT/PTA: PT           Deepika Duron, PT  2018

## 2018-05-23 NOTE — PLAN OF CARE
Problem: Physical Therapy Goal  Goal: Physical Therapy Goal  Goals to be met by: 2018     Patient will increase functional independence with mobility by performin. Sit to stand transfer with Contact Guard Assistance  2. Bed to chair transfer with Contact Guard Assistance using Rolling Walker  3. Gait  x 250x2 feet with Contact Guard Assistance using Rolling Walker.   4. Lower extremity exercise program x20 reps per handout, with assistance as needed     Outcome: Ongoing (interventions implemented as appropriate)  Pt for functional mobility training; progressing well with PT

## 2018-05-23 NOTE — PROGRESS NOTES
Progress Note  Infectious Disease    Follow-up For:  MSSA sepsis    SUBJECTIVE:   ROS:   5/21: Still has fever. Blood cultures from 5/19 positive. CT chest and bone scan noted and reviewed with wife, patient. TTE suboptimal to exclude endocarditis. Complains of right subscapular pain. Complains of constipation. Awaiting Mgcitrate to have its effect.   5/22: no fever today. MRI could not be done due to girth. Pain that was right subscapular is now right subcostal. He coughs, but produces no sputum. No nausea or vomiting but appetite is poor. Had 2 BMs after Mgcitrate. EDUAR will be done when respiratory status is im proved. Tolerates bipap poorly but mask adjusted again today by RT. Voiding without difficulty. Per RN, wife is not in favor of the librium due to intermittent confusion, but he is receiving it.     Antibiotics     Start     Stop Route Frequency Ordered    05/21/18 1400  cefazolin (ANCEF) 2 gram in dextrose 5% 50 mL IVPB (premix)      -- IV Every 8 hours (non-standard times) 05/21/18 1245          Pertinent Medications noted:    EXAM & DIAGNOSTICS REVIEWED:   Vitals:     Temp:  [98.2 °F (36.8 °C)-101.3 °F (38.5 °C)]   Temp: 98.5 °F (36.9 °C) (05/22/18 1609)  Pulse: 82 (05/22/18 1921)  Resp: 18 (05/22/18 1921)  BP: (!) 180/81 (05/22/18 1609)  SpO2: (!) 92 % (05/22/18 1921)    Intake/Output Summary (Last 24 hours) at 05/22/18 1933  Last data filed at 05/22/18 1500   Gross per 24 hour   Intake             1240 ml   Output                0 ml   Net             1240 ml       General:  In NAD. Looks fairly comfortable,   Eyes:  Anicteric, PERRL, EOMI  ENT:  Mouth w/ pink MMM, no lesions/exudate,    Neck:  Trachea midline, supple, no adenopathy appreciated  Lungs:  Decreased Bs with basilar and apical anterior crackles, Bilaterally. No rub at maximal point of discomfort  Heart:  RRR, no gallop/murmur noted  Abd:  soft, NT, ND, normal BS, no masses/organomegaly appreciated.  :  Voids  Musc:  Joints without  effusion, erythema, synovitis,   Skin:  Generally warm, dry, normal for color. No rashes. No septic skin lesions  Wound:   Neuro: AAOx3, speech clear, moves all extrems equally. Fairly appropriate at present  Extrem: No palpable edema, erythema, phlebitis, cellulitis, synovitis  VAD:      Lines/Tubes/Drains:    General Labs reviewed:    Recent Labs  Lab 05/22/18  0453   WBC 6.40   RBC 2.95*   HGB 9.9*   HCT 29.5*   PLT 94*   *   MCH 33.5*   MCHC 33.5       Recent Labs  Lab 05/22/18  0453   CALCIUM 9.3      K 3.7   CO2 26   CL 99   BUN 18   CREATININE 1.3       Micro: MSSA from blood cultures 5/17, 5/19  Sputum culture: normal sharron  Urine culture negative    Imaging Reviewed: CT Chest  Impression   Patchy bilateral upper lobe interstitial infiltrates.  The upper lobe predominance suggest sarcoidosis or a rather atypical infectious process.  Cavitation, pleural thickening or other evidence of tuberculosis is not seen.  Small right pleural effusion and trace left pleural effusion is noted with moderate atelectasis at both lung bases.  Consolidation at the left lung base is not seen.  Mild cardiomegaly.  Coronary artery calcification.     Bone scan: 5/20  Impression   There is no scintigraphic evidence of metastatic disease or osseous destruction..   MRI thoracic spine could not be done due to girth.          ASSESSMENT & PLAN      Patient Active Problem List   Diagnosis    BPH (benign prostatic hypertrophy)    Nocturia    Testicular hypofunction    Community acquired pneumonia of left lower lobe of lung    Severe sepsis    Acute hypoxemic respiratory failure    Paroxysmal atrial fibrillation    CAD (coronary artery disease)    HTN (hypertension)    Type 2 diabetes mellitus with hyperglycemia    Alcohol use    Tobacco abuse    ZACHARY (acute kidney injury)    Obstructive sleep apnea    COPD with acute lower respiratory infection   1.         Staph aureus bacteremia(MSSA) source unclear. CT chest  looks more like fluid than pneumonia to me but now he seems to have pleurisy right subcostal  2.         Pulmonary edema, improved, requiring bipap intermittently  3.         Alcohol abuse, with suspect withdrawal, on librium with intermittent confusion  4.         Diabetes  5.         TKA right, and SQ loop recorder in situ  6. Mid back pain(resolved) and more evident now anterior costal margain, negative bone scan, still may represent focus of MSSA seeding    Recommendation:   Serial blood cultures  Ancef 2 g IV 18  EDUAR when improved from a pulmonary standpoint  CXR portable this pm  CT abd/pelvis 5/23  With rhythm in normal sinus, does he still need therapeutic lovenox?    Discussed at length with wife, and patient. If a source or focus is not identified, I advised that he will be treated for endocarditis. Discussed work up for Staph infections, pros and cons of multiple different tests, evolution of illness sometimes needed to identify all secondary foci.

## 2018-05-23 NOTE — PLAN OF CARE
05/22/18 1921   Patient Assessment/Suction   Level of Consciousness (AVPU) alert   Respiratory Effort Unlabored   All Lung Fields Breath Sounds diminished   PRE-TX-O2-ETCO2   O2 Device (Oxygen Therapy) High Flow nasal Cannula   $ Is the patient on Low Flow Oxygen? Yes   Flow (L/min) 6   SpO2 (!) 92 %   Pulse Oximetry Type Intermittent   $ Pulse Oximetry - Multiple Charge Pulse Oximetry - Multiple   Pulse 82   Resp 18   Aerosol Therapy   $ Aerosol Therapy Charges Aerosol Treatment   Respiratory Treatment Status given   SVN/Inhaler Treatment Route with oxygen;mask   Position During Treatment Sitting on edge of bed (dangling)   Patient Tolerance good   Post-Treatment   Post-treatment Heart Rate (beats/min) 81   Post-treatment Resp Rate (breaths/min) 18   All Fields Breath Sounds unchanged   Preset CPAP/BiPAP Settings   Mode Of Delivery Standby

## 2018-05-23 NOTE — PLAN OF CARE
Problem: Patient Care Overview  Goal: Individualization & Mutuality  AAOx4. Up with assist to restroom. IV antibiotics infused per order. VSS. IS encouraged throughout shift. o2-6L in use.  lasix administered per order. BS monitored. No coverage needed.  Patient remains fall free throughout my shift. Comfort level established. Good pain control with prn medications. Bed low, brakes locked, SR up x2, call light within reach. Will continue to monitor.

## 2018-05-24 LAB
ANION GAP SERPL CALC-SCNC: 8 MMOL/L
BASOPHILS # BLD AUTO: 0 K/UL
BASOPHILS NFR BLD: 0 %
BUN SERPL-MCNC: 23 MG/DL
CALCIUM SERPL-MCNC: 9.4 MG/DL
CHLORIDE SERPL-SCNC: 99 MMOL/L
CO2 SERPL-SCNC: 29 MMOL/L
CREAT SERPL-MCNC: 1.2 MG/DL
DIFFERENTIAL METHOD: ABNORMAL
EOSINOPHIL # BLD AUTO: 0 K/UL
EOSINOPHIL NFR BLD: 0.7 %
ERYTHROCYTE [DISTWIDTH] IN BLOOD BY AUTOMATED COUNT: 15.2 %
EST. GFR  (AFRICAN AMERICAN): >60 ML/MIN/1.73 M^2
EST. GFR  (NON AFRICAN AMERICAN): >60 ML/MIN/1.73 M^2
GLUCOSE SERPL-MCNC: 159 MG/DL
HCT VFR BLD AUTO: 27.5 %
HGB BLD-MCNC: 9.4 G/DL
LYMPHOCYTES # BLD AUTO: 0.6 K/UL
LYMPHOCYTES NFR BLD: 11.3 %
MCH RBC QN AUTO: 33.7 PG
MCHC RBC AUTO-ENTMCNC: 34 G/DL
MCV RBC AUTO: 99 FL
MONOCYTES # BLD AUTO: 0.4 K/UL
MONOCYTES NFR BLD: 8.1 %
NEUTROPHILS # BLD AUTO: 4.4 K/UL
NEUTROPHILS NFR BLD: 79.9 %
PLATELET # BLD AUTO: 117 K/UL
PMV BLD AUTO: 10.3 FL
POCT GLUCOSE: 159 MG/DL (ref 70–110)
POCT GLUCOSE: 177 MG/DL (ref 70–110)
POTASSIUM SERPL-SCNC: 3.9 MMOL/L
RBC # BLD AUTO: 2.79 M/UL
SODIUM SERPL-SCNC: 136 MMOL/L
WBC # BLD AUTO: 5.5 K/UL

## 2018-05-24 PROCEDURE — 63600175 PHARM REV CODE 636 W HCPCS: Performed by: INTERNAL MEDICINE

## 2018-05-24 PROCEDURE — 94761 N-INVAS EAR/PLS OXIMETRY MLT: CPT

## 2018-05-24 PROCEDURE — 99232 SBSQ HOSP IP/OBS MODERATE 35: CPT | Mod: ,,, | Performed by: INTERNAL MEDICINE

## 2018-05-24 PROCEDURE — 36415 COLL VENOUS BLD VENIPUNCTURE: CPT

## 2018-05-24 PROCEDURE — 99233 SBSQ HOSP IP/OBS HIGH 50: CPT | Mod: ,,, | Performed by: INTERNAL MEDICINE

## 2018-05-24 PROCEDURE — 94640 AIRWAY INHALATION TREATMENT: CPT

## 2018-05-24 PROCEDURE — 02HV33Z INSERTION OF INFUSION DEVICE INTO SUPERIOR VENA CAVA, PERCUTANEOUS APPROACH: ICD-10-PCS | Performed by: INTERNAL MEDICINE

## 2018-05-24 PROCEDURE — 25000003 PHARM REV CODE 250: Performed by: INTERNAL MEDICINE

## 2018-05-24 PROCEDURE — 76937 US GUIDE VASCULAR ACCESS: CPT

## 2018-05-24 PROCEDURE — 63600175 PHARM REV CODE 636 W HCPCS: Performed by: NURSE PRACTITIONER

## 2018-05-24 PROCEDURE — 85025 COMPLETE CBC W/AUTO DIFF WBC: CPT

## 2018-05-24 PROCEDURE — 36569 INSJ PICC 5 YR+ W/O IMAGING: CPT

## 2018-05-24 PROCEDURE — 99900035 HC TECH TIME PER 15 MIN (STAT)

## 2018-05-24 PROCEDURE — 25000242 PHARM REV CODE 250 ALT 637 W/ HCPCS: Performed by: NURSE PRACTITIONER

## 2018-05-24 PROCEDURE — S4991 NICOTINE PATCH NONLEGEND: HCPCS | Performed by: NURSE PRACTITIONER

## 2018-05-24 PROCEDURE — 80048 BASIC METABOLIC PNL TOTAL CA: CPT

## 2018-05-24 PROCEDURE — 27000221 HC OXYGEN, UP TO 24 HOURS

## 2018-05-24 PROCEDURE — 25000003 PHARM REV CODE 250: Performed by: NURSE PRACTITIONER

## 2018-05-24 PROCEDURE — C1751 CATH, INF, PER/CENT/MIDLINE: HCPCS

## 2018-05-24 PROCEDURE — 97530 THERAPEUTIC ACTIVITIES: CPT

## 2018-05-24 PROCEDURE — 12000002 HC ACUTE/MED SURGE SEMI-PRIVATE ROOM

## 2018-05-24 PROCEDURE — 94660 CPAP INITIATION&MGMT: CPT

## 2018-05-24 PROCEDURE — 97116 GAIT TRAINING THERAPY: CPT

## 2018-05-24 PROCEDURE — 25000003 PHARM REV CODE 250: Performed by: EMERGENCY MEDICINE

## 2018-05-24 PROCEDURE — 87040 BLOOD CULTURE FOR BACTERIA: CPT

## 2018-05-24 RX ORDER — NYSTATIN 100000 [USP'U]/ML
500000 SUSPENSION ORAL 4 TIMES DAILY
Status: DISCONTINUED | OUTPATIENT
Start: 2018-05-24 | End: 2018-05-30 | Stop reason: HOSPADM

## 2018-05-24 RX ORDER — FUROSEMIDE 10 MG/ML
20 INJECTION INTRAMUSCULAR; INTRAVENOUS ONCE
Status: COMPLETED | OUTPATIENT
Start: 2018-05-24 | End: 2018-05-24

## 2018-05-24 RX ORDER — FUROSEMIDE 10 MG/ML
40 INJECTION INTRAMUSCULAR; INTRAVENOUS 2 TIMES DAILY
Status: DISCONTINUED | OUTPATIENT
Start: 2018-05-24 | End: 2018-05-25

## 2018-05-24 RX ORDER — SODIUM CHLORIDE 0.9 % (FLUSH) 0.9 %
10 SYRINGE (ML) INJECTION EVERY 6 HOURS
Status: DISCONTINUED | OUTPATIENT
Start: 2018-05-25 | End: 2018-05-30 | Stop reason: HOSPADM

## 2018-05-24 RX ORDER — FUROSEMIDE 10 MG/ML
20 INJECTION INTRAMUSCULAR; INTRAVENOUS ONCE
Status: DISCONTINUED | OUTPATIENT
Start: 2018-05-24 | End: 2018-05-24

## 2018-05-24 RX ORDER — SODIUM CHLORIDE 0.9 % (FLUSH) 0.9 %
10 SYRINGE (ML) INJECTION
Status: DISCONTINUED | OUTPATIENT
Start: 2018-05-24 | End: 2018-05-30 | Stop reason: HOSPADM

## 2018-05-24 RX ADMIN — ACETAMINOPHEN 650 MG: 325 TABLET, FILM COATED ORAL at 08:05

## 2018-05-24 RX ADMIN — CHLORDIAZEPOXIDE HYDROCHLORIDE 10 MG: 10 CAPSULE ORAL at 05:05

## 2018-05-24 RX ADMIN — HYDRALAZINE HYDROCHLORIDE 10 MG: 20 INJECTION INTRAMUSCULAR; INTRAVENOUS at 12:05

## 2018-05-24 RX ADMIN — IPRATROPIUM BROMIDE AND ALBUTEROL SULFATE 3 ML: .5; 3 SOLUTION RESPIRATORY (INHALATION) at 11:05

## 2018-05-24 RX ADMIN — IPRATROPIUM BROMIDE AND ALBUTEROL SULFATE 3 ML: .5; 3 SOLUTION RESPIRATORY (INHALATION) at 03:05

## 2018-05-24 RX ADMIN — PANTOPRAZOLE SODIUM 40 MG: 40 TABLET, DELAYED RELEASE ORAL at 09:05

## 2018-05-24 RX ADMIN — ACETAMINOPHEN 650 MG: 325 TABLET, FILM COATED ORAL at 12:05

## 2018-05-24 RX ADMIN — AMLODIPINE BESYLATE 10 MG: 5 TABLET ORAL at 09:05

## 2018-05-24 RX ADMIN — Medication 1 TABLET: at 09:05

## 2018-05-24 RX ADMIN — GABAPENTIN 300 MG: 300 CAPSULE ORAL at 09:05

## 2018-05-24 RX ADMIN — LIDOCAINE 1 PATCH: 50 PATCH TOPICAL at 07:05

## 2018-05-24 RX ADMIN — CHLORDIAZEPOXIDE HYDROCHLORIDE 10 MG: 10 CAPSULE ORAL at 09:05

## 2018-05-24 RX ADMIN — IPRATROPIUM BROMIDE AND ALBUTEROL SULFATE 3 ML: .5; 3 SOLUTION RESPIRATORY (INHALATION) at 07:05

## 2018-05-24 RX ADMIN — NICOTINE 1 PATCH: 14 PATCH, EXTENDED RELEASE TRANSDERMAL at 09:05

## 2018-05-24 RX ADMIN — LACTOBACILLUS ACIDOPHILUS / LACTOBACILLUS BULGARICUS 1 EACH: 100 MILLION CFU STRENGTH GRANULES at 09:05

## 2018-05-24 RX ADMIN — METOPROLOL SUCCINATE 25 MG: 25 TABLET, EXTENDED RELEASE ORAL at 09:05

## 2018-05-24 RX ADMIN — FUROSEMIDE 20 MG: 10 INJECTION, SOLUTION INTRAVENOUS at 09:05

## 2018-05-24 RX ADMIN — ENOXAPARIN SODIUM 140 MG: 150 INJECTION SUBCUTANEOUS at 05:05

## 2018-05-24 RX ADMIN — NYSTATIN 500000 UNITS: 100000 SUSPENSION ORAL at 09:05

## 2018-05-24 RX ADMIN — CHLORDIAZEPOXIDE HYDROCHLORIDE 10 MG: 10 CAPSULE ORAL at 02:05

## 2018-05-24 RX ADMIN — FUROSEMIDE 40 MG: 10 INJECTION, SOLUTION INTRAVENOUS at 09:05

## 2018-05-24 RX ADMIN — THIAMINE HCL TAB 100 MG 100 MG: 100 TAB at 09:05

## 2018-05-24 RX ADMIN — IPRATROPIUM BROMIDE AND ALBUTEROL SULFATE 3 ML: .5; 3 SOLUTION RESPIRATORY (INHALATION) at 04:05

## 2018-05-24 RX ADMIN — ACETAMINOPHEN 650 MG: 325 TABLET, FILM COATED ORAL at 09:05

## 2018-05-24 RX ADMIN — GABAPENTIN 300 MG: 300 CAPSULE ORAL at 02:05

## 2018-05-24 RX ADMIN — ENOXAPARIN SODIUM 140 MG: 150 INJECTION SUBCUTANEOUS at 07:05

## 2018-05-24 RX ADMIN — ASPIRIN 325 MG ORAL TABLET 325 MG: 325 PILL ORAL at 09:05

## 2018-05-24 RX ADMIN — CEFAZOLIN SODIUM 2 G: 2 SOLUTION INTRAVENOUS at 09:05

## 2018-05-24 RX ADMIN — NYSTATIN 500000 UNITS: 100000 SUSPENSION ORAL at 02:05

## 2018-05-24 RX ADMIN — AMIODARONE HYDROCHLORIDE 200 MG: 200 TABLET ORAL at 09:05

## 2018-05-24 RX ADMIN — CEFAZOLIN SODIUM 2 G: 2 SOLUTION INTRAVENOUS at 05:05

## 2018-05-24 RX ADMIN — TAMSULOSIN HYDROCHLORIDE 0.4 MG: 0.4 CAPSULE ORAL at 09:05

## 2018-05-24 RX ADMIN — ATORVASTATIN CALCIUM 40 MG: 40 TABLET, FILM COATED ORAL at 09:05

## 2018-05-24 RX ADMIN — BENAZEPRIL HYDROCHLORIDE 40 MG: 10 TABLET, FILM COATED ORAL at 09:05

## 2018-05-24 RX ADMIN — MONTELUKAST SODIUM 10 MG: 10 TABLET, FILM COATED ORAL at 09:05

## 2018-05-24 NOTE — PROGRESS NOTES
Progress Note  PULMONARY  2  Admit Date: 5/17/2018 05/24/2018      SUBJECTIVE:     May 22, alert, uses hospital bipap but dislikes home units in past.  No home o2, usually good function.  Having pleuritic chest pain rather   May 23, alert, on 5 lpm sat 94%, confused last pm.  Uses niv.  May 24, still lingering left anterior chest pain (was more posterior),  Had lasix response wrt increase uo.  Used bipap last pm , still on 5lpm      PFSH and Allergies reviewed.    OBJECTIVE:     Vitals (Most recent):  Vitals:    05/24/18 0530   BP: (!) 154/68   Pulse: 70   Resp: 20   Temp: 99.3 °F (37.4 °C)       Vitals (24 hour range):  Temp:  [98 °F (36.7 °C)-100.2 °F (37.9 °C)]   Pulse:  [68-78]   Resp:  [16-29]   BP: (147-211)/()   SpO2:  [93 %-100 %]       Intake/Output Summary (Last 24 hours) at 05/24/18 0732  Last data filed at 05/24/18 0600   Gross per 24 hour   Intake             1250 ml   Output             1000 ml   Net              250 ml          Physical Exam:  The patient's neuro status (alertness,orientation,cognitive function,motor skills,), pharyngeal exam (oral lesions, hygiene, abn dentition,), Neck (jvd,mass,thyroid,nodes in neck and above/below clavicle),RESPIRATORY(symmetry,effort,fremitus,percussion,auscultation),  Cor(rhythm,heart tones including gallops,perfusion,edema)ABD(distention,hepatic&splenomegaly,tenderness,masses), Skin(rash,cyanosis),Psyc(affect,judgement,).  Exam negative except for these pertinent findings:    Morbid obese, M4, edema legs +1 brawney- sl improved. Decreased bs, no distress on high flow ox. Symmetric,nl fremitus/percussion, oral yeast    Myocardial Findings     Left Ventricle ejection fraction at 65%. Normal left ventricle diastolic function. Normal left atrial pressure.      Right Ventricle Normal ejection fraction.      Left Atrium Cavity is severely dilated        Results for MEKA MEYER (MRN [id number]) as of 5/23/2018 16:39   Ref. Range 5/23/2018 09:47   POC PH  Latest Ref Range: 7.35 - 7.45  7.419   POC PCO2 Latest Ref Range: 35 - 45 mmHg 39.2   POC PO2 Latest Ref Range: 80 - 100 mmHg 62 (L)       Radiographs reviewed: view by direct vision  cxr worsened from admit 5/18 to 5/20, ct with patchy gg infiltrates bilat with pleural eff /thickening. - cxr 5/22 still left >>right lung edema.  Results for orders placed during the hospital encounter of 05/17/18   X-Ray Chest 1 View    Narrative EXAMINATION:  XR CHEST 1 VIEW    CLINICAL HISTORY:  pneumonia; Lobar pneumonia, unspecified organism    TECHNIQUE:  Single frontal view of the chest was performed.    COMPARISON:  Chest of 19 May 2018.    FINDINGS:  There is mild cardiomegaly.  There is continued moderate pulmonary edema.  There is decreased density at the left lower lobe consistent with resolving pneumonia.      Impression Resolving left lower lobe pneumonia.  Continued moderate pulmonary edema and cardiomegaly consistent with congestive heart failure.      Electronically signed by: Renny Cuevas MD  Date:    05/20/2018  Time:    08:35   ]Impression       Patchy bilateral upper lobe interstitial infiltrates.  The upper lobe predominance suggest sarcoidosis or a rather atypical infectious process.  Cavitation, pleural thickening or other evidence of tuberculosis is not seen.  Small right pleural effusion and trace left pleural effusion is noted with moderate atelectasis at both lung bases.  Consolidation at the left lung base is not seen.  Mild cardiomegaly.  Coronary artery calcification.      Electronically signed by: Renny Cuevas MD  Date: 05/20/2018  Time: 10:35         Labs       Recent Labs  Lab 05/24/18  0415   WBC 5.50   HGB 9.4*   HCT 27.5*   *       Recent Labs  Lab 05/24/18  0415      K 3.9   CL 99   CO2 29   BUN 23   CREATININE 1.2   *   CALCIUM 9.4       Recent Labs  Lab 05/23/18  0947   PH 7.419   PCO2 39.2   PO2 62*   HCO3 25.4     Microbiology Results (last 7 days)     Procedure  Component Value Units Date/Time    Blood culture [[id number]] Collected:  05/24/18 0549    Order Status:  Sent Specimen:  Blood Updated:  05/24/18 0549    Blood culture [[id number]] Collected:  05/23/18 0635    Order Status:  Completed Specimen:  Blood from Antecubital, Left Updated:  05/23/18 1715     Blood Culture, Routine No Growth to date    Blood culture [[id number]] Collected:  05/22/18 0453    Order Status:  Completed Specimen:  Blood from Antecubital, Right  Arm Updated:  05/23/18 1131     Blood Culture, Routine Gram stain aer bottle: Gram positive cocci in clusters resembling Staph      Blood Culture, Routine Results called to and read back by:Jigna Rios RN 05/23/2018  11:31    Blood culture [[id number]] Collected:  05/19/18 1356    Order Status:  Completed Specimen:  Blood Updated:  05/22/18 1454     Blood Culture, Routine Gram stain aer bottle: Gram positive cocci in clusters resembling Staph      Blood Culture, Routine Results called to and read back by: Vivian Castillo RN  05/20/2018  22:34     Blood Culture, Routine --     STAPHYLOCOCCUS AUREUS  ID consult required at [facility] locations.  For susceptibility see order #[id number]      Narrative:       Blood cultures x 2    Blood culture [[id number]]  (Susceptibility) Collected:  05/19/18 1346    Order Status:  Completed Specimen:  Blood Updated:  05/22/18 1454     Blood Culture, Routine Gram stain aer bottle: Gram positive cocci in clusters resembling Staph      Blood Culture, Routine Results called to and read back by: Radha Mendoza RN  05/20/2018  19:40     Blood Culture, Routine --     STAPHYLOCOCCUS AUREUS  ID consult required at [facility].      Narrative:       X 2 sets    Culture, Respiratory with Gram Stain [[id number]] Collected:  05/20/18 1056    Order Status:  Completed Specimen:  Respiratory from Sputum Updated:  05/22/18 1255     Respiratory Culture Normal respiratory sharron     Gram Stain (Respiratory)  <10 epithelial cells per low power field.     Gram Stain (Respiratory) Many WBC's     Gram Stain (Respiratory) Rare Gram positive cocci    Blood culture [[id number]] Collected:  05/17/18 1945    Order Status:  Completed Specimen:  Blood Updated:  05/20/18 1310     Blood Culture, Routine Gram stain aer bottle: Gram positive cocci in clusters resembling Staph      Blood Culture, Routine Gram stain sharon bottle: Gram positive cocci in clusters resembling Staph      Blood Culture, Routine Results called to and read back by: Renny Hu RN 05/18/2018       Blood Culture, Routine 17:32     Blood Culture, Routine --     STAPHYLOCOCCUS AUREUS  ID consult required at [facility] locations.  For susceptibility see order #[id number]      Blood culture [[id number]]  (Susceptibility) Collected:  05/17/18 1940    Order Status:  Completed Specimen:  Blood from Antecubital, Right  Arm Updated:  05/20/18 1032     Blood Culture, Routine Gram stain aer bottle: Gram positive cocci in clusters resembling Staph      Blood Culture, Routine Gram stain sharon bottle: Gram positive cocci in clusters resembling Staph      Blood Culture, Routine Results called to and read back by: Renny Hu RN 05/18/2018       Blood Culture, Routine 17:32     Blood Culture, Routine --     STAPHYLOCOCCUS AUREUS  ID consult required at [facility] and [facility] locations.      Urine culture [[id number]] Collected:  05/18/18 1130    Order Status:  Completed Specimen:  Urine from Urine, Clean Catch Updated:  05/19/18 2147     Urine Culture, Routine No growth      Results for MEKA MEYER (MRN [id number]) as of 5/22/2018 17:18   Ref. Range 5/19/2018 00:04 5/19/2018 01:27   POC PH Latest Ref Range: 7.35 - 7.45  7.410 7.482 (H)   POC PCO2 Latest Ref Range: 35 - 45 mmHg 35.9 33.3 (L)   POC PO2 Latest Ref Range: 80 - 100 mmHg 41 (LL) 219 (H)       Impression:  Active Hospital Problems    Diagnosis  POA    *Acute hypoxemic respiratory  failure [J96.01]  Yes    Obstructive sleep apnea [G47.33]  Yes    COPD with acute lower respiratory infection [J44.0]  Yes    Alcohol use [Z78.9]  Yes    Tobacco abuse [Z72.0]  Yes    ZACHARY (acute kidney injury) [N17.9]  Yes    Community acquired pneumonia of left lower lobe of lung [J18.1]  Yes    Severe sepsis [A41.9, R65.20]  Yes    Paroxysmal atrial fibrillation [I48.0]  Yes    CAD (coronary artery disease) [I25.10]  Yes    HTN (hypertension) [I10]  Yes    Type 2 diabetes mellitus with hyperglycemia [E11.65]  Yes      Resolved Hospital Problems    Diagnosis Date Resolved POA   No resolved problems to display.               Plan:     May 22, will f/u cxr.  Should clear resp failure with rx staph.  Copd  And osas and cor pulmonale may play role?  Echo suggest mitral dz/left heart problem?    May 23,  cxr sluggish, still left infiltrate, bnp 361-will give 20 lasix ivp x1 .  Adjust niv.      May 24, will dose lasix 20 again as still edema and labs ok.  Use niv for sleep and f/u cxr.  Seems sl better - delirium makes eval difficult?  mycostatin                            .

## 2018-05-24 NOTE — PLAN OF CARE
Problem: Patient Care Overview  Goal: Plan of Care Review  Outcome: Ongoing (interventions implemented as appropriate)  Pt had a decent night free from fall or injury.  Pt remained oriented and sat up OOB in chair.  Pt tolerated BiPAP for the majority of the shift but asked to transfer to NC and tolerated well with o2 sats maintained.  Continuous pulse ox in use.  Tele monitor in place, BG monitored.  Pt outstanding for a MRI and EDUAR.  Tolerating PO.  PIV in place and patent; no new skin breakdown noted. Pt continent of bladder utilizing urinal during shift and no BM noted. Pt and his spouse were updated on his POC and verbalized an understanding. Bedside rails raised x2 with call bell and bedside table within reach.  Nurse rounded hourly to ensure pt safety.

## 2018-05-24 NOTE — PLAN OF CARE
Problem: Physical Therapy Goal  Goal: Physical Therapy Goal  Goals to be met by: 2018     Patient will increase functional independence with mobility by performin. Sit to stand transfer with Contact Guard Assistance  2. Bed to chair transfer with Contact Guard Assistance using Rolling Walker  3. Gait  x 250x2 feet with Contact Guard Assistance using Rolling Walker.   4. Lower extremity exercise program x20 reps per handout, with assistance as needed     Outcome: Ongoing (interventions implemented as appropriate)  Pt for functional mobility training/ gait training with O2

## 2018-05-24 NOTE — PT/OT/SLP PROGRESS
Physical Therapy Treatment    Patient Name:  Jun Stuart   MRN:  [id number]    Recommendations:     Discharge Recommendations:   (OP Pulmo Rehab)   Discharge Equipment Recommendations: none   Barriers to discharge: decreased tolerance for functional mobility    Assessment:     Jun Stuart is a 72 y.o. male admitted with a medical diagnosis of Acute hypoxemic respiratory failure.  He presents with the following impairments/functional limitations:  weakness, impaired endurance, impaired functional mobilty, decreased coordination, decreased lower extremity function, decreased safety awareness, impaired cardiopulmonary response to activity; after walking, pt's SPO2 at 96% and HR at 68 bpm, with increased SOB right after walking but dissipated after 1 min of rest in the chair; no distress noted.    Rehab Prognosis:  good; patient would benefit from acute skilled PT services to address these deficits and reach maximum level of function.      Recent Surgery: * No surgery found *      Plan:     During this hospitalization, patient to be seen 6 x/week to address the above listed problems via gait training, therapeutic activities, therapeutic exercises  · Plan of Care Expires:  06/08/18   Plan of Care Reviewed with: patient, spouse    Subjective     Communicated with TRINI Brooks prior to session.  Patient found sitting in the chair upon PT entry to room, agreeable to treatment.      Chief Complaint: feels weak especially today, more than yesterday  Patient comments/goals: walk as much as he can  Pain/Comfort:  · Pain Rating 1: 0/10    Patients cultural, spiritual, Sabianism conflicts given the current situation: none    Objective:     Patient found with: pulse ox (continuous), peripheral IV, oxygen     General Precautions: Standard, fall, respiratory   Orthopedic Precautions:N/A   Braces: N/A     Functional Mobility:  · Transfers:     · Sit to Stand:  contact guard assistance with rolling walker and with pt pushing up from  the chairs armrest from sitting  · Gait: 180 ft then after seated rest break in a W/C continued walking x 70 ft with O2 at 8L/min per NC with portable O2 tank in tow      AM-PAC 6 CLICK MOBILITY  Turning over in bed (including adjusting bedclothes, sheets and blankets)?: 3  Sitting down on and standing up from a chair with arms (e.g., wheelchair, bedside commode, etc.): 4  Moving from lying on back to sitting on the side of the bed?: 3  Moving to and from a bed to a chair (including a wheelchair)?: 3  Need to walk in hospital room?: 3  Climbing 3-5 steps with a railing?: 1  Total Score: 17     Therapeutic Activities and Exercises:   PT requested a W/C from transport today for safety as pt might sit down when fatigued/ SOB, better for safety; socks were cut on the sides to accommodate pt's swollen legs.    Patient left up in chair with all lines intact, call button in reach, RN Cecilia notified and daughter present..    GOALS:    Physical Therapy Goals        Problem: Physical Therapy Goal    Goal Priority Disciplines Outcome Goal Variances Interventions   Physical Therapy Goal     PT/OT, PT Ongoing (interventions implemented as appropriate)     Description:  Goals to be met by: 2018     Patient will increase functional independence with mobility by performin. Sit to stand transfer with Contact Guard Assistance  2. Bed to chair transfer with Contact Guard Assistance using Rolling Walker  3. Gait  x 250x2 feet with Contact Guard Assistance using Rolling Walker.   4. Lower extremity exercise program x20 reps per handout, with assistance as needed                      Time Tracking:     PT Received On: 18  PT Start Time: 1553     PT Stop Time: 1618  PT Total Time (min): 25 min     Billable Minutes: Gait Training 16 and Therapeutic Activity 9    Treatment Type: Treatment  PT/PTA: PT           Deepika Duron, PT  2018

## 2018-05-24 NOTE — PLAN OF CARE
Problem: Patient Care Overview  Goal: Plan of Care Review  Outcome: Ongoing (interventions implemented as appropriate)  Plan of care reviewed with patient he and wife verbalized understanding. Cont on IV ABT no ADR noted worked with PT ambulated with FWW around nurses station and therapist. Cont on blood glucose monitoring. C/o pleuretic pain when coughing. Picc line is being placed by picc nurse per order. Call light within reach will cont to monitor wife at bedside.

## 2018-05-24 NOTE — PROCEDURES
Jun Stuart is a 72 y.o. male patient.    Temp: 99.7 °F (37.6 °C) (05/24/18 1631)  Pulse: 68 (05/24/18 1631)  Resp: 20 (05/24/18 1631)  BP: (!) 148/67 (05/24/18 1631)  SpO2: 95 % (05/24/18 1631)  Weight: 134.3 kg (296 lb) (05/21/18 0814)  Height: 6' (182.9 cm) (05/21/18 0814)    PICC  Date/Time: 5/24/2018 6:09 PM  Performed by: DARLINE MOLINA  Consent Done: Yes  Time out: Immediately prior to procedure a time out was called to verify the correct patient, procedure, equipment, support staff and site/side marked as required  Indications: vascular access and med administration  Anesthesia: local infiltration  Local anesthetic: lidocaine 1% without epinephrine  Anesthetic Total (mL): 3  Preparation: skin prepped with ChloraPrep  Skin prep agent dried: skin prep agent completely dried prior to procedure  Sterile barriers: all five maximum sterile barriers used - cap, mask, sterile gown, sterile gloves, and large sterile sheet  Hand hygiene: hand hygiene performed prior to central venous catheter insertion  Location details: right basilic  Catheter type: double lumen  Catheter size: 5 Fr  Catheter Length: 41cm    Ultrasound guidance: yes  Vessel Caliber: large and patent, compressibility normal  Needle advanced into vessel with real time Ultrasound guidance.  Guidewire confirmed in vessel.  Image recorded and saved.  Sterile sheath used.  Number of attempts: 1  Post-procedure: blood return through all ports, chlorhexidine patch and sterile dressing applied    Assessment: placement verified by x-ray, tip termination and successful placement  Tip Termination Explanation: svc  Complications: none          Darline Molina  5/24/2018

## 2018-05-24 NOTE — PROGRESS NOTES
Progress Note  Infectious Disease    Follow-up For:  MSSA sepsis    SUBJECTIVE:   ROS:   5/21: Still has fever. Blood cultures from 5/19 positive. CT chest and bone scan noted and reviewed with wife, patient. TTE suboptimal to exclude endocarditis. Complains of right subscapular pain. Complains of constipation. Awaiting Mgcitrate to have its effect.   5/22: no fever today. MRI could not be done due to girth. Pain that was right subscapular is now right subcostal. He coughs, but produces no sputum. No nausea or vomiting but appetite is poor. Had 2 BMs after Mgcitrate. EDUAR will be done when respiratory status is im proved. Tolerates bipap poorly but mask adjusted again today by RT. Voiding without difficulty. Per RN, wife is not in favor of the librium due to intermittent confusion, but he is receiving it.   5/23: spoke with patient's dermatologist. Last visit was 9/2017 for dishydrotic eczema. This is too far in the past to associate with this illness. Blood cultures are still positive. Ct abd with no foci for bacteremia. CXR still looking wet and appreciate Dr. Walker ordering diuretic. No new visual, respi, GI,  , MSK or skin symptoms. A little more sedated and confused today, which may be from disturbed sleep on CPAP plus librium.     Antibiotics     Start     Stop Route Frequency Ordered    05/21/18 1400  cefazolin (ANCEF) 2 gram in dextrose 5% 50 mL IVPB (premix)      -- IV Every 8 hours (non-standard times) 05/21/18 1245          Pertinent Medications noted:    EXAM & DIAGNOSTICS REVIEWED:   Vitals:     Temp:  [98 °F (36.7 °C)-99.2 °F (37.3 °C)]   Temp: 99.2 °F (37.3 °C) (05/23/18 2024)  Pulse: 69 (05/23/18 2039)  Resp: (!) 24 (05/23/18 2039)  BP: (!) 181/79 (05/23/18 2024)  SpO2: 98 % (05/23/18 2039)    Intake/Output Summary (Last 24 hours) at 05/23/18 2124  Last data filed at 05/23/18 1800   Gross per 24 hour   Intake              700 ml   Output                0 ml   Net              700 ml       General:   In NAD. Looks fairly comfortable, a little more sedated appearing(?sleep deprived)  Eyes:  Anicteric, PERRL, EOMI  ENT:  Mouth w/ pink MMM, no lesions/exudate,    Neck:  Trachea midline, supple, no adenopathy appreciated  Lungs:  Decreased Bs with basilar and apical anterior crackles, Bilaterally.  Heart:  RRR, no gallop/rub, ?soft systolic murmur.  tachy.   Abd:  soft, NT, ND, normal BS, no masses/organomegaly appreciated.  :  Voids  Musc:  Joints without effusion, erythema, synovitis,   Skin:  Generally warm, dry, normal for color. No rashes. No septic skin lesions  Wound:   Neuro: AAOx3, speech clear, moves all extrems equally. Fairly appropriate at present  Extrem: No palpable edema, erythema, phlebitis, cellulitis, synovitis  VAD:      Lines/Tubes/Drains:    General Labs reviewed:    Recent Labs  Lab 05/23/18  0635   WBC 6.70   RBC 2.94*   HGB 9.9*   HCT 29.3*   *   *   MCH 33.6*   MCHC 33.6       Recent Labs  Lab 05/23/18  0635   CALCIUM 9.6   *   K 4.2   CO2 27   CL 99   BUN 19   CREATININE 1.1       Micro: MSSA from blood cultures 5/17, 5/19, 5/22  Sputum culture: normal sharron  Urine culture negative    Imaging Reviewed: CT Chest  Impression   Patchy bilateral upper lobe interstitial infiltrates.  The upper lobe predominance suggest sarcoidosis or a rather atypical infectious process.  Cavitation, pleural thickening or other evidence of tuberculosis is not seen.  Small right pleural effusion and trace left pleural effusion is noted with moderate atelectasis at both lung bases.  Consolidation at the left lung base is not seen.  Mild cardiomegaly.  Coronary artery calcification.     Bone scan: 5/20  Impression   There is no scintigraphic evidence of metastatic disease or osseous destruction..   MRI thoracic spine could not be done due to girth.    Abdomen/pelvis Ct 5/23  Impression   Bilateral small pleural effusions and basilar atelectasis slightly increased in size from the prior  study.  Chronic degenerative disc disease from L2-L5.  Mild prominence of the left adrenal gland without a focal mass seen.  Atherosclerosis         ASSESSMENT & PLAN      Patient Active Problem List   Diagnosis    BPH (benign prostatic hypertrophy)    Nocturia    Testicular hypofunction    Community acquired pneumonia of left lower lobe of lung    Severe sepsis    Acute hypoxemic respiratory failure    Paroxysmal atrial fibrillation    CAD (coronary artery disease)    HTN (hypertension)    Type 2 diabetes mellitus with hyperglycemia    Alcohol use    Tobacco abuse    ZACHARY (acute kidney injury)    Obstructive sleep apnea    COPD with acute lower respiratory infection   1.         Staph aureus bacteremia(MSSA) source unclear. CT chest looks more like fluid than pneumonia to me , persistent positive cultures  2.         Pulmonary edema, improved, requiring bipap intermittently  3.         Alcohol abuse, with suspect withdrawal, on librium with intermittent confusion  4.         Diabetes  5.         TKA right, and SQ loop recorder in situ  6. Mid back pain(resolved) and more evident now anterior costal margain, negative bone scan, still may represent focus of MSSA seeding    Recommendation:   Serial blood cultures  Ancef 2 g IV 18  EDUAR when improved from a pulmonary standpoint    With rhythm in normal sinus, does he still need therapeutic lovenox?    Discussed at length with wife, and patient. If a source or focus is not identified, I advised that he will be treated for endocarditis. Discussed work up for Staph infections, pros and cons of multiple different tests, evolution of illness sometimes needed to identify all secondary foci.

## 2018-05-24 NOTE — PROGRESS NOTES
Progress Note  Infectious Disease    Follow-up For:  MSSA sepsis    SUBJECTIVE:   ROS:   5/21: Still has fever. Blood cultures from 5/19 positive. CT chest and bone scan noted and reviewed with wife, patient. TTE suboptimal to exclude endocarditis. Complains of right subscapular pain. Complains of constipation. Awaiting Mgcitrate to have its effect.   5/22: no fever today. MRI could not be done due to girth. Pain that was right subscapular is now right subcostal. He coughs, but produces no sputum. No nausea or vomiting but appetite is poor. Had 2 BMs after Mgcitrate. EDUAR will be done when respiratory status is im proved. Tolerates bipap poorly but mask adjusted again today by RT. Voiding without difficulty. Per RN, wife is not in favor of the librium due to intermittent confusion, but he is receiving it.   5/23: spoke with patient's dermatologist. Last visit was 9/2017 for dishydrotic eczema. This is too far in the past to associate with this illness. Blood cultures are still positive. Ct abd with no foci for bacteremia. CXR still looking wet and appreciate Dr. Walker ordering diuretic. No new visual, respi, GI,  , MSK or skin symptoms. A little more sedated and confused today, which may be from disturbed sleep on CPAP plus librium.   5/24: blood cultures from yesterday negative so far. CXR is improved with decreased fluid. IV lasix ordered by Dr. Montes. Mentation is a little better today, but looks depressed and not eating well. Ambulates with PT. PICC line was inserted. No new MSK complaints. No cigarettes x 7d. Discussed potential for transition to LTAC prior to home but wife really wants to take him home.     Antibiotics     Start     Stop Route Frequency Ordered    05/21/18 1400  cefazolin (ANCEF) 2 gram in dextrose 5% 50 mL IVPB (premix)      -- IV Every 8 hours (non-standard times) 05/21/18 1245          Pertinent Medications noted:    EXAM & DIAGNOSTICS REVIEWED:   Vitals:     Temp:  [97.4 °F (36.3  °C)-100.2 °F (37.9 °C)]   Temp: 99.7 °F (37.6 °C) (05/24/18 1631)  Pulse: 68 (05/24/18 1631)  Resp: 20 (05/24/18 1631)  BP: (!) 148/67 (05/24/18 1631)  SpO2: 95 % (05/24/18 1631)    Intake/Output Summary (Last 24 hours) at 05/24/18 1851  Last data filed at 05/24/18 0600   Gross per 24 hour   Intake              550 ml   Output             1000 ml   Net             -450 ml       General:  In NAD. Looks fairly comfortable, more appropriate today  Eyes:  Anicteric, PERRL, EOMI  ENT:  Mouth w/ pink MMM, no lesions/exudate,    Neck:  Trachea midline, supple, no adenopathy appreciated  Lungs:  Decreased Bs with basilar  crackles, Bilaterally. A little better  Heart:  RRR, no gallop/rub, ?soft systolic murmur.  tachy.   Abd:  soft, NT, ND, normal BS, no masses/organomegaly appreciated.  :  Voids  Musc:  Joints without effusion, erythema, synovitis,   Skin:  Generally warm, dry, normal for color. No rashes. No septic skin lesions  Wound:   Neuro: AAOx3, speech clear, moves all extrems equally. Fairly appropriate at present  Extrem: No palpable edema, erythema, phlebitis, cellulitis, synovitis  VAD:      Lines/Tubes/Drains:    General Labs reviewed:    Recent Labs  Lab 05/24/18  0415   WBC 5.50   RBC 2.79*   HGB 9.4*   HCT 27.5*   *   MCV 99*   MCH 33.7*   MCHC 34.0       Recent Labs  Lab 05/24/18  0415   CALCIUM 9.4      K 3.9   CO2 29   CL 99   BUN 23   CREATININE 1.2       Micro: MSSA from blood cultures 5/17, 5/19, 5/22 with 5/23 negative so far  Sputum culture: normal sharron  Urine culture negative    Imaging Reviewed: CT Chest  Impression   Patchy bilateral upper lobe interstitial infiltrates.  The upper lobe predominance suggest sarcoidosis or a rather atypical infectious process.  Cavitation, pleural thickening or other evidence of tuberculosis is not seen.  Small right pleural effusion and trace left pleural effusion is noted with moderate atelectasis at both lung bases.  Consolidation at the left lung  base is not seen.  Mild cardiomegaly.  Coronary artery calcification.     Bone scan: 5/20  Impression   There is no scintigraphic evidence of metastatic disease or osseous destruction..   MRI thoracic spine could not be done due to girth.    Abdomen/pelvis Ct 5/23  Impression   Bilateral small pleural effusions and basilar atelectasis slightly increased in size from the prior study.  Chronic degenerative disc disease from L2-L5.  Mild prominence of the left adrenal gland without a focal mass seen.  Atherosclerosis         ASSESSMENT & PLAN      Patient Active Problem List   Diagnosis    BPH (benign prostatic hypertrophy)    Nocturia    Testicular hypofunction    Community acquired pneumonia of left lower lobe of lung    Severe sepsis    Acute hypoxemic respiratory failure    Paroxysmal atrial fibrillation    CAD (coronary artery disease)    HTN (hypertension)    Type 2 diabetes mellitus with hyperglycemia    Alcohol use    Tobacco abuse    ZACHARY (acute kidney injury)    Obstructive sleep apnea    COPD with acute lower respiratory infection   1.         Staph aureus bacteremia(MSSA) source unclear. CT chest looks more like fluid than pneumonia to me , persistent positive cultures  2.         Pulmonary edema, improved, requiring bipap intermittently  3.         Alcohol abuse, with suspect withdrawal, on librium with intermittent confusion  4.         Diabetes  5.         TKA right, and SQ loop recorder in situ  6. Mid back pain(resolved) and more evident now anterior costal margain, negative bone scan,    Recommendation:   Ok for picc  Ancef 2 g IV 18  EDUAR when improved from a pulmonary standpoint, ?next week  Ok with me to reduce librium    Discussed at length with wife, and patient. If a source or focus is not identified, I advised that he will be treated for endocarditis.   Discussed potential need to transition to LTAC rather than directly to home due to the multi organ difficulties that he has had.

## 2018-05-24 NOTE — PROGRESS NOTES
"Progress Note  Hospital Medicine  Patient Name:Jun Stuart  MRN:  [id number]  Patient Class: IP- Inpatient  Admit Date: 5/17/2018  Length of Stay: 7 days  Expected Discharge Date:   Attending Physician: Erik Granados MD  Primary Care Provider:  Juanjo Park MD    SUBJECTIVE:     Principal Problem: Acute hypoxemic respiratory failure  Initial history of present illness: Jun Stuart is a 72 y.o. Male with PMHx significant for afib, CAD, GERD, HTN, DM and HLD.  He is admitted to the service of hospital medicine with severe sepsis and acute hypoxemic respiratory failure. He presented to the ED via EMS for further evaluation of confusion.  His wife stated that  this am the patient awoke, he began complaining of upper back pain. He was brought to [facility], where the wife states that patient had an "x-ray and EKG." She states that the patient was then given pain medication and discharged. The patient took the pain medication and then drank alcohol.  He became nausous and had 1 episode of emesis.  Wife stated he went to bed and when he tried to get up to go to the bathroom, he couldn't, so she called EMS.  She reported that he had been confused after the pain medication and alcohol.  She stated that he had chills while at [facility], but did not take his temperature.  She endorsed the patient has had and increase in cough that is nonproductive. EMS states that the patient had an CBG of 193 and was 91% on RA on arrival.    PMH/PSH/SH/FH/Meds: reviewed.    Symptoms/Review of Systems:  Tmax 100.2 F. Sitting in chair. Blood cultures grew Staph Aureus sp. Patient is on 5 L/min NC supplemental oxygen. Using BiPAP nightly. Patient could not get MRI T-spine due to body habitus. No chest pain or headache, fever or abdominal pain.     Diet:  Adequate intake.    Activity level: Up with assistance   Pain:  Patient reports no pain.       OBJECTIVE:   Vital Signs (Most Recent):      Temp: 99.3 °F (37.4 °C) " (05/24/18 0530)  Pulse: 70 (05/24/18 0751)  Resp: 16 (05/24/18 0751)  BP: (!) 154/68 (05/24/18 0530)  SpO2: 96 % (05/24/18 0751)       Vital Signs Range (Last 24H):  Temp:  [98 °F (36.7 °C)-100.2 °F (37.9 °C)]   Pulse:  [68-78]   Resp:  [16-29]   BP: (147-211)/()   SpO2:  [93 %-100 %]     Weight: 134.3 kg (296 lb)  Body mass index is 40.14 kg/m².    Intake/Output Summary (Last 24 hours) at 05/24/18 0846  Last data filed at 05/24/18 0600   Gross per 24 hour   Intake             1010 ml   Output             1000 ml   Net               10 ml     Physical Examination:  Constitutional: He is oriented to person, place, and time. He appears well-developed and well-nourished. No distress.   Obese   HENT:   Head: Normocephalic and atraumatic.   Mouth/Throat: Oropharynx is clear and moist.   Eyes: Conjunctivae and EOM are normal. Pupils are equal, round, and reactive to light. No scleral icterus.   Neck: Normal range of motion. Neck supple. No JVD present. No tracheal deviation present. No thyromegaly present.   Cardiovascular: Normal rate, regular rhythm, normal heart sounds and intact distal pulses.  Exam reveals no gallop and no friction rub.    No murmur heard.  Pulses:       Dorsalis pedis pulses are 2+ on the right side, and 2+ on the left side.   Pulmonary/Chest: No accessory muscle usage. No respiratory distress. He has rhonchi. He has no rales.   Abdominal: Soft. Bowel sounds are normal. He exhibits no distension and no mass. There is no tenderness. There is no guarding.   Musculoskeletal: Normal range of motion. He exhibits no edema, tenderness or deformity.   Neurological: He is alert and oriented to person, place, and time. He has normal strength. He exhibits normal muscle tone. Coordination normal.   Skin: Skin is warm and intact. Capillary refill takes less than 2 seconds. No rash noted.   Psychiatric: He has a normal mood and affect. His speech is normal and behavior is normal. Judgment and thought  content normal.   Vitals reviewed.  CRANIAL NERVES   CN III, IV, VI   Pupils are equal, round, and reactive to light.  Extraocular motions are normal.     CBC:    Recent Labs  Lab 05/22/18 0453 05/23/18 0635 05/24/18 0415   WBC 6.40 6.70 5.50   RBC 2.95* 2.94* 2.79*   HGB 9.9* 9.9* 9.4*   HCT 29.5* 29.3* 27.5*   PLT 94* 104* 117*   * 100* 99*   MCH 33.5* 33.6* 33.7*   MCHC 33.5 33.6 34.0   BMP    Recent Labs  Lab 05/22/18 0453 05/23/18 0635 05/24/18 0415   * 162* 159*    135* 136   K 3.7 4.2 3.9   CL 99 99 99   CO2 26 27 29   BUN 18 19 23   CREATININE 1.3 1.1 1.2   CALCIUM 9.3 9.6 9.4      Diagnostic Results:  Microbiology Results (last 7 days)     Procedure Component Value Units Date/Time    Blood culture [[id number]] Collected:  05/24/18 0549    Order Status:  Sent Specimen:  Blood Updated:  05/24/18 0549    Blood culture [[id number]] Collected:  05/23/18 0635    Order Status:  Completed Specimen:  Blood from Antecubital, Left Updated:  05/23/18 1715     Blood Culture, Routine No Growth to date    Blood culture [[id number]] Collected:  05/22/18 0453    Order Status:  Completed Specimen:  Blood from Antecubital, Right  Arm Updated:  05/23/18 1131     Blood Culture, Routine Gram stain aer bottle: Gram positive cocci in clusters resembling Staph      Blood Culture, Routine Results called to and read back by:Jigna Rios RN 05/23/2018  11:31    Blood culture [[id number]] Collected:  05/19/18 1356    Order Status:  Completed Specimen:  Blood Updated:  05/22/18 1454     Blood Culture, Routine Gram stain aer bottle: Gram positive cocci in clusters resembling Staph      Blood Culture, Routine Results called to and read back by: Vivian Castillo RN  05/20/2018  22:34     Blood Culture, Routine --     STAPHYLOCOCCUS AUREUS  ID consult required at [facility] and [facility].  For susceptibility see order #[id number]      Narrative:       Blood cultures x 2    Blood culture [[id number]]   (Susceptibility) Collected:  05/19/18 1346    Order Status:  Completed Specimen:  Blood Updated:  05/22/18 1454     Blood Culture, Routine Gram stain aer bottle: Gram positive cocci in clusters resembling Staph      Blood Culture, Routine Results called to and read back by: Radha Mendoza RN  05/20/2018  19:40     Blood Culture, Routine --     STAPHYLOCOCCUS AUREUS  ID consult required at [facility].      Narrative:       X 2 sets    Culture, Respiratory with Gram Stain [[id number]] Collected:  05/20/18 1056    Order Status:  Completed Specimen:  Respiratory from Sputum Updated:  05/22/18 1255     Respiratory Culture Normal respiratory sharron     Gram Stain (Respiratory) <10 epithelial cells per low power field.     Gram Stain (Respiratory) Many WBC's     Gram Stain (Respiratory) Rare Gram positive cocci    Blood culture [[id number]] Collected:  05/17/18 1945    Order Status:  Completed Specimen:  Blood Updated:  05/20/18 1310     Blood Culture, Routine Gram stain aer bottle: Gram positive cocci in clusters resembling Staph      Blood Culture, Routine Gram stain sharon bottle: Gram positive cocci in clusters resembling Staph      Blood Culture, Routine Results called to and read back by: Renny Hu RN 05/18/2018       Blood Culture, Routine 17:32     Blood Culture, Routine --     STAPHYLOCOCCUS AUREUS  ID consult required at [facility] and [facility] locations.  For susceptibility see order #[id number]      Blood culture [[id number]]  (Susceptibility) Collected:  05/17/18 1940    Order Status:  Completed Specimen:  Blood from Antecubital, Right  Arm Updated:  05/20/18 1032     Blood Culture, Routine Gram stain aer bottle: Gram positive cocci in clusters resembling Staph      Blood Culture, Routine Gram stain sharon bottle: Gram positive cocci in clusters resembling Staph      Blood Culture, Routine Results called to and read back by: Renny Hu RN 05/18/2018       Blood Culture,  Routine 17:32     Blood Culture, Routine --     STAPHYLOCOCCUS AUREUS  ID consult required at [facility],[place] and [facility] locations.      Urine culture [[id number]] Collected:  05/18/18 1130    Order Status:  Completed Specimen:  Urine from Urine, Clean Catch Updated:  05/19/18 2147     Urine Culture, Routine No growth         CXR: New left lower lobe pneumonia.  Cardiomegaly.  Prominence of the pulmonary vasculature may represent borderline heart function.    CXR: Congestive heart failure with mild to moderate pulmonary edema.  Additional dense infiltrate in the left lower lobe consistent with pneumonia.    CXR: Resolving left lower lobe pneumonia.  Continued moderate pulmonary edema and cardiomegaly consistent with congestive heart failure.    CT chest without contrast:  Patchy bilateral upper lobe interstitial infiltrates.  The upper lobe predominance suggest sarcoidosis or a rather atypical infectious process.  Cavitation, pleural thickening or other evidence of tuberculosis is not seen.  Small right pleural effusion and trace left pleural effusion is noted with moderate atelectasis at both lung bases.  Consolidation at the left lung base is not seen.  Mild cardiomegaly.  Coronary artery calcification.    Bone scan: There is no scintigraphic evidence of metastatic disease or osseous destruction.    ECHO:  · Left atrium is severely dilated.  · LVEF appears to be normal      Performing Sonographer     Vanita Guzman    Reason For Exam   Priority: Routine   Dx: Acute respiratory failure with hypoxemia [J96.01 (ICD-10-CM)]      Vitals     Height Weight BMI (Calculated) BSA (Calculated - sq m) BP   6' (1.829 m) 134.3 kg (296 lb) 40.2 2.61 sq meters 167/73       Study Details     A complete echo was performed using spectral Doppler. Overall the study quality was poor. The study had technical difficulties. The study was difficult due to patient's body habitus.   Myocardial Findings     Left Ventricle ejection  fraction at 65%. Normal left ventricle diastolic function. Normal left atrial pressure.      Right Ventricle Normal ejection fraction.      Left Atrium Cavity is severely dilated.        CT abdomen and pelvis with contrast:  Bilateral small pleural effusions and basilar atelectasis slightly increased in size from the prior study.  Chronic degenerative disc disease from L2-L5.  Mild prominence of the left adrenal gland without a focal mass seen.  Atherosclerosis    Assessment/Plan:     Acute hypoxemic respiratory failure  Community acquired pneumonia of left lower lobe of lung  Staph Aureus Pneumonia, left lower lobe  Staph Aureus Bacteremia  Supplemental O2 via nasal canula; titrate O2 saturation to >92%. Use BiPAP as needed.  Follow pulmonary recommendations.   Continue beta 2 agonist bronchodilator treatments.   Continue IV antibiotics - Vancomycin and Zosyn. Pharmacist to dose Vancomycin. Follow ID recommendations. Patient would need 6 weeks of IV antibiotics.   Check sputum GS and Cx.   Continue routine medications as before.   Whole body scan results reviewed.  2 D ECHO results reviewed which is sub-optimal. Discussed with Dr. SALVATORE Montes for EDUAR, patient's respiratory status not staus for EDUAR at this time, may require anesthesiologist assistance.  Patient could not get MRI of back due to body habitus.   Follow CT abdomen and pelvis results.                                                   Type 2 diabetes mellitus with hyperglycemia       Chronic problem.  Uncontrolled hyperglycemia upon admission labs.    Check blood glucose level q AC/HS.  Use Novolog Insulin Sliding Scale as needed.   Continue American Diabetic Association 1800 Kcal diet.            UTI            Follow urine C/S. Continue IV antibiotics.                   ZACHARY (acute kidney injury)       Improved on IVF's.  IVF hydration.  Follow renal panel and electrolytes closely.  Adjust renal dose medications for Estimated Creatinine Clearance: 66.9  mL/min (based on SCr of 1.4 mg/dL).  Avoid nonessential nephrotoxic agents.           Tobacco abuse       Dangers of cigarette smoking were reviewed with patient in detail for 3 minutes and patient was encouraged to quit. Nicotine replacement options were discussed - option to use Nicoderm.           Alcohol use       Reportedly drinks etoh daily without history of DT's  Nursing to perform CIWA assessment, monitoring closesly for DT's (tachycardia, diaphoresis, hypertension, and aggitation)  Will utilize multivitamins  Educated on the deleterious effects of alcohol use  Seizure precautions with prn ativan   Fall precautions           HTN (hypertension)       Chronic problem.  Labile.  Continue home antihypertensive regimen and monitor b/p closely.  Adjust medications as necessary.           CAD (coronary artery disease)       Historical diagnosis with stent placement.  Did not present with s/s of ACS.  Will continue home BB, ASA, and Statin therapy.  Cardiac monitoring  Monitor closely for s/s of ACS.           Paroxysmal atrial fibrillation       S/P cardioversion.  Has been in NSR since. On Lovenox 1 mg/kg sq q 12 hrs.   Continue home BB, Amiodarone.  Cardiac monitoring.  Patient appears volume overloaded and has rales and CXR is suggesting central venous congestion. Start IV lasix 40 mg bid # 4 doses.        Discussed with daughter, brother and wife. They have multiple questions. Time spent in care of the patient, counseling and coordination of care (Greater than 50% spent in direct face to face contact): 35 min.    VTE Risk Mitigation         Ordered     enoxaparin injection 140 mg  Every 12 hours (non-standard times)      05/21/18 1623     IP VTE LOW RISK PATIENT  Once      05/17/18 5776        Erik Granados MD  Department of Hospital Medicine   [facility]

## 2018-05-25 LAB
ALLENS TEST: ABNORMAL
ANION GAP SERPL CALC-SCNC: 10 MMOL/L
BACTERIA BLD CULT: NORMAL
BASOPHILS # BLD AUTO: 0.1 K/UL
BASOPHILS NFR BLD: 1 %
BUN SERPL-MCNC: 25 MG/DL
CALCIUM SERPL-MCNC: 9.4 MG/DL
CHLORIDE SERPL-SCNC: 98 MMOL/L
CO2 SERPL-SCNC: 27 MMOL/L
CREAT SERPL-MCNC: 1.2 MG/DL
CRP SERPL-MCNC: 224.2 MG/L
DELSYS: ABNORMAL
DIFFERENTIAL METHOD: ABNORMAL
EOSINOPHIL # BLD AUTO: 0 K/UL
EOSINOPHIL NFR BLD: 0.4 %
ERYTHROCYTE [DISTWIDTH] IN BLOOD BY AUTOMATED COUNT: 15.5 %
EST. GFR  (AFRICAN AMERICAN): >60 ML/MIN/1.73 M^2
EST. GFR  (NON AFRICAN AMERICAN): >60 ML/MIN/1.73 M^2
FLOW: 6
GIANT PLATELETS BLD QL SMEAR: PRESENT
GLUCOSE SERPL-MCNC: 154 MG/DL
HCO3 UR-SCNC: 28.4 MMOL/L (ref 24–28)
HCT VFR BLD AUTO: 27.2 %
HGB BLD-MCNC: 9.2 G/DL
LYMPHOCYTES # BLD AUTO: 0.7 K/UL
LYMPHOCYTES NFR BLD: 9.8 %
MCH RBC QN AUTO: 33.5 PG
MCHC RBC AUTO-ENTMCNC: 33.8 G/DL
MCV RBC AUTO: 99 FL
MODE: ABNORMAL
MONOCYTES # BLD AUTO: 0.7 K/UL
MONOCYTES NFR BLD: 9.2 %
NEUTROPHILS # BLD AUTO: 5.7 K/UL
NEUTROPHILS NFR BLD: 79.6 %
PCO2 BLDA: 37.3 MMHG (ref 35–45)
PH SMN: 7.49 [PH] (ref 7.35–7.45)
PLATELET # BLD AUTO: 113 K/UL
PLATELET BLD QL SMEAR: ABNORMAL
PMV BLD AUTO: 10.2 FL
PO2 BLDA: 75 MMHG (ref 80–100)
POC BE: 5 MMOL/L
POC SATURATED O2: 96 % (ref 95–100)
POC TCO2: 29 MMOL/L (ref 23–27)
POCT GLUCOSE: 134 MG/DL (ref 70–110)
POCT GLUCOSE: 160 MG/DL (ref 70–110)
POCT GLUCOSE: 164 MG/DL (ref 70–110)
POTASSIUM SERPL-SCNC: 3.8 MMOL/L
RBC # BLD AUTO: 2.74 M/UL
SAMPLE: ABNORMAL
SITE: ABNORMAL
SODIUM SERPL-SCNC: 135 MMOL/L
WBC # BLD AUTO: 7.2 K/UL

## 2018-05-25 PROCEDURE — 25000003 PHARM REV CODE 250: Performed by: EMERGENCY MEDICINE

## 2018-05-25 PROCEDURE — 25000003 PHARM REV CODE 250: Performed by: INTERNAL MEDICINE

## 2018-05-25 PROCEDURE — 87077 CULTURE AEROBIC IDENTIFY: CPT

## 2018-05-25 PROCEDURE — 97802 MEDICAL NUTRITION INDIV IN: CPT

## 2018-05-25 PROCEDURE — 85025 COMPLETE CBC W/AUTO DIFF WBC: CPT

## 2018-05-25 PROCEDURE — 97530 THERAPEUTIC ACTIVITIES: CPT

## 2018-05-25 PROCEDURE — 27000221 HC OXYGEN, UP TO 24 HOURS

## 2018-05-25 PROCEDURE — 25000003 PHARM REV CODE 250: Performed by: NURSE PRACTITIONER

## 2018-05-25 PROCEDURE — 97116 GAIT TRAINING THERAPY: CPT

## 2018-05-25 PROCEDURE — 25000242 PHARM REV CODE 250 ALT 637 W/ HCPCS: Performed by: NURSE PRACTITIONER

## 2018-05-25 PROCEDURE — 99233 SBSQ HOSP IP/OBS HIGH 50: CPT | Mod: ,,, | Performed by: INTERNAL MEDICINE

## 2018-05-25 PROCEDURE — 12000002 HC ACUTE/MED SURGE SEMI-PRIVATE ROOM

## 2018-05-25 PROCEDURE — 82803 BLOOD GASES ANY COMBINATION: CPT

## 2018-05-25 PROCEDURE — A4216 STERILE WATER/SALINE, 10 ML: HCPCS | Performed by: INTERNAL MEDICINE

## 2018-05-25 PROCEDURE — S4991 NICOTINE PATCH NONLEGEND: HCPCS | Performed by: NURSE PRACTITIONER

## 2018-05-25 PROCEDURE — 94640 AIRWAY INHALATION TREATMENT: CPT

## 2018-05-25 PROCEDURE — 80048 BASIC METABOLIC PNL TOTAL CA: CPT

## 2018-05-25 PROCEDURE — 63600175 PHARM REV CODE 636 W HCPCS: Performed by: INTERNAL MEDICINE

## 2018-05-25 PROCEDURE — 87186 SC STD MICRODIL/AGAR DIL: CPT

## 2018-05-25 PROCEDURE — 94761 N-INVAS EAR/PLS OXIMETRY MLT: CPT

## 2018-05-25 PROCEDURE — 86140 C-REACTIVE PROTEIN: CPT

## 2018-05-25 PROCEDURE — 36415 COLL VENOUS BLD VENIPUNCTURE: CPT

## 2018-05-25 PROCEDURE — 36600 WITHDRAWAL OF ARTERIAL BLOOD: CPT

## 2018-05-25 PROCEDURE — 87040 BLOOD CULTURE FOR BACTERIA: CPT

## 2018-05-25 PROCEDURE — 99900035 HC TECH TIME PER 15 MIN (STAT)

## 2018-05-25 RX ORDER — CHLORDIAZEPOXIDE HYDROCHLORIDE 5 MG/1
5 CAPSULE, GELATIN COATED ORAL 2 TIMES DAILY
Status: DISCONTINUED | OUTPATIENT
Start: 2018-05-25 | End: 2018-05-28

## 2018-05-25 RX ORDER — ENOXAPARIN SODIUM 100 MG/ML
40 INJECTION SUBCUTANEOUS EVERY 24 HOURS
Status: DISCONTINUED | OUTPATIENT
Start: 2018-05-25 | End: 2018-05-30 | Stop reason: HOSPADM

## 2018-05-25 RX ORDER — FUROSEMIDE 10 MG/ML
40 INJECTION INTRAMUSCULAR; INTRAVENOUS 2 TIMES DAILY
Status: DISCONTINUED | OUTPATIENT
Start: 2018-05-25 | End: 2018-05-30 | Stop reason: HOSPADM

## 2018-05-25 RX ADMIN — FUROSEMIDE 40 MG: 10 INJECTION, SOLUTION INTRAVENOUS at 06:05

## 2018-05-25 RX ADMIN — SODIUM CHLORIDE, PRESERVATIVE FREE 10 ML: 5 INJECTION INTRAVENOUS at 05:05

## 2018-05-25 RX ADMIN — TAMSULOSIN HYDROCHLORIDE 0.4 MG: 0.4 CAPSULE ORAL at 09:05

## 2018-05-25 RX ADMIN — ACETAMINOPHEN 650 MG: 325 TABLET, FILM COATED ORAL at 04:05

## 2018-05-25 RX ADMIN — BENAZEPRIL HYDROCHLORIDE 40 MG: 10 TABLET, FILM COATED ORAL at 09:05

## 2018-05-25 RX ADMIN — NYSTATIN 500000 UNITS: 100000 SUSPENSION ORAL at 01:05

## 2018-05-25 RX ADMIN — IPRATROPIUM BROMIDE AND ALBUTEROL SULFATE 3 ML: .5; 3 SOLUTION RESPIRATORY (INHALATION) at 03:05

## 2018-05-25 RX ADMIN — IPRATROPIUM BROMIDE AND ALBUTEROL SULFATE 3 ML: .5; 3 SOLUTION RESPIRATORY (INHALATION) at 07:05

## 2018-05-25 RX ADMIN — PANTOPRAZOLE SODIUM 40 MG: 40 TABLET, DELAYED RELEASE ORAL at 09:05

## 2018-05-25 RX ADMIN — LIDOCAINE 1 PATCH: 50 PATCH TOPICAL at 06:05

## 2018-05-25 RX ADMIN — NICOTINE 1 PATCH: 14 PATCH, EXTENDED RELEASE TRANSDERMAL at 09:05

## 2018-05-25 RX ADMIN — LACTOBACILLUS ACIDOPHILUS / LACTOBACILLUS BULGARICUS 1 EACH: 100 MILLION CFU STRENGTH GRANULES at 10:05

## 2018-05-25 RX ADMIN — AMIODARONE HYDROCHLORIDE 200 MG: 200 TABLET ORAL at 09:05

## 2018-05-25 RX ADMIN — CEFAZOLIN SODIUM 2 G: 2 SOLUTION INTRAVENOUS at 05:05

## 2018-05-25 RX ADMIN — SODIUM CHLORIDE, PRESERVATIVE FREE 10 ML: 5 INJECTION INTRAVENOUS at 12:05

## 2018-05-25 RX ADMIN — NYSTATIN 500000 UNITS: 100000 SUSPENSION ORAL at 04:05

## 2018-05-25 RX ADMIN — AMLODIPINE BESYLATE 10 MG: 5 TABLET ORAL at 09:05

## 2018-05-25 RX ADMIN — GABAPENTIN 300 MG: 300 CAPSULE ORAL at 02:05

## 2018-05-25 RX ADMIN — CHLORDIAZEPOXIDE HYDROCHLORIDE 10 MG: 10 CAPSULE ORAL at 05:05

## 2018-05-25 RX ADMIN — SODIUM CHLORIDE, PRESERVATIVE FREE 10 ML: 5 INJECTION INTRAVENOUS at 06:05

## 2018-05-25 RX ADMIN — ATORVASTATIN CALCIUM 40 MG: 40 TABLET, FILM COATED ORAL at 09:05

## 2018-05-25 RX ADMIN — GABAPENTIN 300 MG: 300 CAPSULE ORAL at 10:05

## 2018-05-25 RX ADMIN — METOPROLOL SUCCINATE 25 MG: 25 TABLET, EXTENDED RELEASE ORAL at 09:05

## 2018-05-25 RX ADMIN — ENOXAPARIN SODIUM 140 MG: 150 INJECTION SUBCUTANEOUS at 05:05

## 2018-05-25 RX ADMIN — NYSTATIN 500000 UNITS: 100000 SUSPENSION ORAL at 10:05

## 2018-05-25 RX ADMIN — FUROSEMIDE 40 MG: 10 INJECTION, SOLUTION INTRAVENOUS at 09:05

## 2018-05-25 RX ADMIN — CHLORDIAZEPOXIDE HYDROCHLORIDE 5 MG: 5 CAPSULE ORAL at 10:05

## 2018-05-25 RX ADMIN — NYSTATIN 500000 UNITS: 100000 SUSPENSION ORAL at 09:05

## 2018-05-25 RX ADMIN — LACTOBACILLUS ACIDOPHILUS / LACTOBACILLUS BULGARICUS 1 EACH: 100 MILLION CFU STRENGTH GRANULES at 09:05

## 2018-05-25 RX ADMIN — IPRATROPIUM BROMIDE AND ALBUTEROL SULFATE 3 ML: .5; 3 SOLUTION RESPIRATORY (INHALATION) at 08:05

## 2018-05-25 RX ADMIN — MONTELUKAST SODIUM 10 MG: 10 TABLET, FILM COATED ORAL at 10:05

## 2018-05-25 RX ADMIN — ACETAMINOPHEN 650 MG: 325 TABLET, FILM COATED ORAL at 11:05

## 2018-05-25 RX ADMIN — ACETAMINOPHEN 650 MG: 325 TABLET, FILM COATED ORAL at 10:05

## 2018-05-25 RX ADMIN — GABAPENTIN 300 MG: 300 CAPSULE ORAL at 09:05

## 2018-05-25 RX ADMIN — CEFAZOLIN SODIUM 2 G: 2 SOLUTION INTRAVENOUS at 10:05

## 2018-05-25 RX ADMIN — ENOXAPARIN SODIUM 40 MG: 100 INJECTION SUBCUTANEOUS at 04:05

## 2018-05-25 RX ADMIN — THIAMINE HCL TAB 100 MG 100 MG: 100 TAB at 09:05

## 2018-05-25 RX ADMIN — IPRATROPIUM BROMIDE AND ALBUTEROL SULFATE 3 ML: .5; 3 SOLUTION RESPIRATORY (INHALATION) at 11:05

## 2018-05-25 RX ADMIN — CEFAZOLIN SODIUM 2 G: 2 SOLUTION INTRAVENOUS at 02:05

## 2018-05-25 NOTE — PLAN OF CARE
05/25/18 0742   Patient Assessment/Suction   Level of Consciousness (AVPU) alert   Respiratory Effort Normal;Unlabored   Expansion/Accessory Muscles/Retractions no use of accessory muscles;no retractions;expansion symmetric   All Lung Fields Breath Sounds clear;diminished   Rhythm/Pattern, Respiratory depth regular;pattern regular;unlabored   Cough Frequency infrequent   Cough Type good;nonproductive   PRE-TX-O2-ETCO2   O2 Device (Oxygen Therapy) High Flow nasal Cannula   $ Is the patient on Low Flow Oxygen? Yes   Flow (L/min) 6   SpO2 95 %   Pulse Oximetry Type Continuous   $ Pulse Oximetry - Multiple Charge Pulse Oximetry - Multiple   Pulse 72   Resp 18   Aerosol Therapy   $ Aerosol Therapy Charges Aerosol Treatment   Respiratory Treatment Status given   SVN/Inhaler Treatment Route mask   Position During Treatment Sitting in chair   Patient Tolerance good   Post-Treatment   Post-treatment Heart Rate (beats/min) 76   Post-treatment Resp Rate (breaths/min) 18   All Fields Breath Sounds aeration increased       Aerosol treatments q4 with Duoneb. Patient tolerated well.

## 2018-05-25 NOTE — PT/OT/SLP PROGRESS
Physical Therapy Treatment    Patient Name:  Jun Stuart   MRN:  [id number]    Recommendations:     Discharge Recommendations:   (OP Pulmo Rehab)   Discharge Equipment Recommendations: none   Barriers to discharge: Decreased caregiver support    Assessment:     Jun Stuart is a 72 y.o. male admitted with a medical diagnosis of Acute hypoxemic respiratory failure.  He presents with the following impairments/functional limitations:  weakness, impaired endurance, impaired functional mobilty, gait instability, decreased lower extremity function, decreased safety awareness, impaired cardiopulmonary response to activity, edema, impaired coordination, had been sleepy today; after gait, SPO2 was at 93% and HR at 69bpm.    Rehab Prognosis:  good; patient would benefit from acute skilled PT services to address these deficits and reach maximum level of function.      Recent Surgery: * No surgery found *      Plan:     During this hospitalization, patient to be seen 6 x/week to address the above listed problems via therapeutic activities, gait training, therapeutic exercises  · Plan of Care Expires:  06/08/18   Plan of Care Reviewed with: patient, sibling    Subjective     Communicated with TRINI Pimentel prior to session.  Patient found sitting in a recliner, min labored breathing upon PT entry to room, brother in the room, agreeable to treatment.      Chief Complaint: sleepy most of the time today  Patient comments/goals: walk and feel better  Pain/Comfort:  · Pain Rating 1: 0/10  · Pain Rating Post-Intervention 1: 0/10    Patients cultural, spiritual, Nondenominational conflicts given the current situation: none    Objective:     Patient found with: pulse ox (continuous), telemetry, oxygen     General Precautions: Standard, fall, respiratory   Orthopedic Precautions:N/A   Braces: N/A     Functional Mobility:  · Transfers:     · Sit to Stand:  moderate assistance with rolling walker and pt pushing up on the arm rest  · Gait: 250 ft  wth a RW, CGA, with O2 per NC at 6L/min, per portable O2 tank in tow; pt had a seated rest break after 170 ft of walking      AM-PAC 6 CLICK MOBILITY  Turning over in bed (including adjusting bedclothes, sheets and blankets)?: 3  Sitting down on and standing up from a chair with arms (e.g., wheelchair, bedside commode, etc.): 3  Moving from lying on back to sitting on the side of the bed?: 3  Moving to and from a bed to a chair (including a wheelchair)?: 3  Need to walk in hospital room?: 3  Climbing 3-5 steps with a railing?: 1  Total Score: 16       Therapeutic Activities and Exercises:   pt able to scoot fwd in the recliner SBA; O2 was transferred to portable, telemetry pack in tow as well during gait.    Patient left up in chair with all lines intact, call button in reach, RN notified and brother and CNA present..    GOALS:    Physical Therapy Goals        Problem: Physical Therapy Goal    Goal Priority Disciplines Outcome Goal Variances Interventions   Physical Therapy Goal     PT/OT, PT Ongoing (interventions implemented as appropriate)     Description:  Goals to be met by: 2018     Patient will increase functional independence with mobility by performin. Sit to stand transfer with Contact Guard Assistance  2. Bed to chair transfer with Contact Guard Assistance using Rolling Walker  3. Gait  x 250x2 feet with Contact Guard Assistance using Rolling Walker.   4. Lower extremity exercise program x20 reps per handout, with assistance as needed                      Time Tracking:     PT Received On: 18  PT Start Time: 1520     PT Stop Time: 1555  PT Total Time (min): 35 min     Billable Minutes: Gait Training 22 and Therapeutic Activity 13    Treatment Type: Treatment  PT/PTA: PT           Deepika Duron, PT  2018

## 2018-05-25 NOTE — PROGRESS NOTES
Progress Note  Cardiology    Admit Date: 5/17/2018   LOS: 8 days     Follow-up For: Stap aureus bacteremia; pneumonia; volume overload.     Scheduled Meds:   albuterol-ipratropium  3 mL Nebulization Q4H    amiodarone  200 mg Oral Daily    amLODIPine  10 mg Oral Daily    aspirin  325 mg Oral Daily    atorvastatin  40 mg Oral Daily    benazepril  40 mg Oral Daily    ceFAZolin (ANCEF) IVPB  2 g Intravenous Q8H    chlordiazepoxide  5 mg Oral BID    enoxaparin  40 mg Subcutaneous Daily    folic acid-vit B6-vit B12 2.5-25-2 mg  1 tablet Oral Daily    furosemide  40 mg Intravenous BID    gabapentin  300 mg Oral TID    lactobacillus acidophilus & bulgar  1 packet Oral BID    lidocaine  1 patch Transdermal Q24H    metoprolol succinate  25 mg Oral Daily    montelukast  10 mg Oral QHS    nicotine  1 patch Transdermal Daily    nystatin  500,000 Units Oral QID    pantoprazole  40 mg Oral Daily    sodium chloride 0.9%  10 mL Intravenous Q6H    tamsulosin  0.4 mg Oral Daily    thiamine  100 mg Oral Daily     Continuous Infusions:  PRN Meds:acetaminophen, dextrose 50%, dextrose 50%, glucagon (human recombinant), glucose, glucose, hydrALAZINE, insulin aspart U-100, lorazepam, lorazepam, lorazepam, magnesium hydroxide 400 mg/5 ml, ondansetron, polyethylene glycol, senna-docusate 8.6-50 mg, Flushing PICC Protocol **AND** sodium chloride 0.9% **AND** sodium chloride 0.9%, sulfur hexafluoride microspheres, traMADol    Review of patient's allergies indicates:  No Known Allergies    SUBJECTIVE:     Interval History: Patient has complaints cough.    Review of Systems  Respiratory: positive for cough, negative for hemoptysis, sputum and wheezing  Cardiovascular: negative for chest pressure/discomfort, dyspnea and palpitations    OBJECTIVE:     Vital Signs (Most Recent)  Temp: (!) 101.1 °F (38.4 °C) (05/25/18 1550)  Pulse: 73 (05/25/18 1550)  Resp: 16 (05/25/18 1550)  BP: (!) 182/77 (05/25/18 1550)  SpO2: (!) 94 %  (05/25/18 1550)    Vital Signs Range (Last 24H):  Temp:  [99.3 °F (37.4 °C)-101.3 °F (38.5 °C)]   Pulse:  [62-75]   Resp:  [16-27]   BP: (155-186)/(70-78)   SpO2:  [92 %-100 %]       Physical Exam:  Neck: JVD - 1 cm above sternal notch, no carotid bruit and supple, symmetrical, trachea midline  Lungs: clear to auscultation bilaterally, normal respiratory effort  Heart: regular rate and rhythm, S1, S2 normal, no murmur, click, rub or gallop  Abdomen: soft, non-tender; bowel sounds normal; no masses,  no organomegaly  Extremities: Positive for: edema 2+ and pitting bilaterally    Recent Results (from the past 24 hour(s))   CBC auto differential    Collection Time: 05/25/18  4:22 AM   Result Value Ref Range    WBC 7.20 3.90 - 12.70 K/uL    RBC 2.74 (L) 4.60 - 6.20 M/uL    Hemoglobin 9.2 (L) 14.0 - 18.0 g/dL    Hematocrit 27.2 (L) 40.0 - 54.0 %    MCV 99 (H) 82 - 98 fL    MCH 33.5 (H) 27.0 - 31.0 pg    MCHC 33.8 32.0 - 36.0 g/dL    RDW 15.5 (H) 11.5 - 14.5 %    Platelets 113 (L) 150 - 350 K/uL    MPV 10.2 9.2 - 12.9 fL    Gran # (ANC) 5.7 1.8 - 7.7 K/uL    Lymph # 0.7 (L) 1.0 - 4.8 K/uL    Mono # 0.7 0.3 - 1.0 K/uL    Eos # 0.0 0.0 - 0.5 K/uL    Baso # 0.10 0.00 - 0.20 K/uL    Gran% 79.6 (H) 38.0 - 73.0 %    Lymph% 9.8 (L) 18.0 - 48.0 %    Mono% 9.2 4.0 - 15.0 %    Eosinophil% 0.4 0.0 - 8.0 %    Basophil% 1.0 0.0 - 1.9 %    Platelet Estimate Decreased (A)     Large/Giant Platelets Present     Differential Method Automated    Basic metabolic panel    Collection Time: 05/25/18  4:22 AM   Result Value Ref Range    Sodium 135 (L) 136 - 145 mmol/L    Potassium 3.8 3.5 - 5.1 mmol/L    Chloride 98 95 - 110 mmol/L    CO2 27 23 - 29 mmol/L    Glucose 154 (H) 70 - 110 mg/dL    BUN, Bld 25 (H) 8 - 23 mg/dL    Creatinine 1.2 0.5 - 1.4 mg/dL    Calcium 9.4 8.7 - 10.5 mg/dL    Anion Gap 10 8 - 16 mmol/L    eGFR if African American >60 >60 mL/min/1.73 m^2    eGFR if non African American >60 >60 mL/min/1.73 m^2   POCT glucose     Collection Time: 05/25/18 11:41 AM   Result Value Ref Range    POCT Glucose 164 (H) 70 - 110 mg/dL   ISTAT PROCEDURE    Collection Time: 05/25/18  4:39 PM   Result Value Ref Range    POC PH 7.489 (H) 7.35 - 7.45    POC PCO2 37.3 35 - 45 mmHg    POC PO2 75 (L) 80 - 100 mmHg    POC HCO3 28.4 (H) 24 - 28 mmol/L    POC BE 5 -2 to 2 mmol/L    POC SATURATED O2 96 95 - 100 %    POC TCO2 29 (H) 23 - 27 mmol/L    Sample ARTERIAL     Site RR     Allens Test Pass     DelSys Nasal Can     Mode SPONT     Flow 6    POCT glucose    Collection Time: 05/25/18  5:02 PM   Result Value Ref Range    POCT Glucose 134 (H) 70 - 110 mg/dL       Diagnostic Results:  Labs: Reviewed  ECG: Reviewed  X-Ray: Reviewed    ASSESSMENT/PLAN:     Lasix 40 mg iv x 4 doses and assess response. Has had 2 doses thus far - no significant change.   Less lethargy this pm. Ambulated with PT. Reports less zayas?

## 2018-05-25 NOTE — PROGRESS NOTES
"Progress Note  Hospital Medicine  Patient Name:Jun Stuart  MRN:  [id number]  Patient Class: IP- Inpatient  Admit Date: 5/17/2018  Length of Stay: 8 days  Expected Discharge Date:   Attending Physician: Erik Granados MD  Primary Care Provider:  Juanjo Park MD    SUBJECTIVE:     Principal Problem: Acute hypoxemic respiratory failure  Initial history of present illness: Jun Stuart is a 72 y.o. Male with PMHx significant for afib, CAD, GERD, HTN, DM and HLD.  He is admitted to the service of hospital medicine with severe sepsis and acute hypoxemic respiratory failure. He presented to the ED via EMS for further evaluation of confusion.  His wife stated that  this am the patient awoke, he began complaining of upper back pain. He was brought to [facility], where the wife states that patient had an "x-ray and EKG." She states that the patient was then given pain medication and discharged. The patient took the pain medication and then drank alcohol.  He became nausous and had 1 episode of emesis.  Wife stated he went to bed and when he tried to get up to go to the bathroom, he couldn't, so she called EMS.  She reported that he had been confused after the pain medication and alcohol.  She stated that he had chills while at [facility], but did not take his temperature.  She endorsed the patient has had and increase in cough that is nonproductive. EMS states that the patient had an CBG of 193 and was 91% on RA on arrival.    PMH/PSH/SH/FH/Meds: reviewed.    Symptoms/Review of Systems:  Tmax 100.2 F. Sitting in chair. Blood cultures grew Staph Aureus sp. Patient is on 6 L/min NC supplemental oxygen. Using BiPAP nightly. Patient could not get MRI T-spine due to body habitus. No chest pain or headache, fever or abdominal pain.     Diet:  Adequate intake.    Activity level: Up with assistance   Pain:  Patient reports no pain.       OBJECTIVE:   Vital Signs (Most Recent):      Temp: 99.8 °F (37.7 °C) " (05/25/18 0600)  Pulse: 73 (05/25/18 0329)  Resp: 20 (05/25/18 0329)  BP: (!) 186/78 (05/25/18 0312)  SpO2: (!) 94 % (05/25/18 0329)       Vital Signs Range (Last 24H):  Temp:  [97.4 °F (36.3 °C)-101.3 °F (38.5 °C)]   Pulse:  [62-75]   Resp:  [16-27]   BP: (132-186)/(59-78)   SpO2:  [93 %-100 %]     Weight: 134.3 kg (296 lb)  Body mass index is 40.14 kg/m².    Intake/Output Summary (Last 24 hours) at 05/25/18 0612  Last data filed at 05/25/18 0534   Gross per 24 hour   Intake              620 ml   Output              900 ml   Net             -280 ml     Physical Examination:  Constitutional: He is oriented to person, place, and time. He appears well-developed and well-nourished. No distress.   Obese   HENT:   Head: Normocephalic and atraumatic.   Mouth/Throat: Oropharynx is clear and moist.   Eyes: Conjunctivae and EOM are normal. Pupils are equal, round, and reactive to light. No scleral icterus.   Neck: Normal range of motion. Neck supple. No JVD present. No tracheal deviation present. No thyromegaly present.   Cardiovascular: Normal rate, regular rhythm, normal heart sounds and intact distal pulses.  Exam reveals no gallop and no friction rub.    No murmur heard.  Pulses:       Dorsalis pedis pulses are 2+ on the right side, and 2+ on the left side.   Pulmonary/Chest: No accessory muscle usage. No respiratory distress. He has rhonchi. He has no rales.   Abdominal: Soft. Bowel sounds are normal. He exhibits no distension and no mass. There is no tenderness. There is no guarding.   Musculoskeletal: Normal range of motion. He exhibits no edema, tenderness or deformity.   Neurological: He is alert and oriented to person, place, and time. He has normal strength. He exhibits normal muscle tone. Coordination normal.   Skin: Skin is warm and intact. Capillary refill takes less than 2 seconds. No rash noted.   Psychiatric: He has a normal mood and affect. His speech is normal and behavior is normal. Judgment and thought  content normal.   Vitals reviewed.  CRANIAL NERVES   CN III, IV, VI   Pupils are equal, round, and reactive to light.  Extraocular motions are normal.     CBC:    Recent Labs  Lab 05/23/18 0635 05/24/18 0415 05/25/18 0422   WBC 6.70 5.50 7.20   RBC 2.94* 2.79* 2.74*   HGB 9.9* 9.4* 9.2*   HCT 29.3* 27.5* 27.2*   * 117* 113*   * 99* 99*   MCH 33.6* 33.7* 33.5*   MCHC 33.6 34.0 33.8   BMP    Recent Labs  Lab 05/23/18 0635 05/24/18 0415 05/25/18 0422   * 159* 154*   * 136 135*   K 4.2 3.9 3.8   CL 99 99 98   CO2 27 29 27   BUN 19 23 25*   CREATININE 1.1 1.2 1.2   CALCIUM 9.6 9.4 9.4      Diagnostic Results:  Microbiology Results (last 7 days)     Procedure Component Value Units Date/Time    Blood culture [[id number]] Collected:  05/24/18 0549    Order Status:  Completed Specimen:  Blood Updated:  05/24/18 1745     Blood Culture, Routine No Growth to date    Blood culture [[id number]] Collected:  05/23/18 0635    Order Status:  Completed Specimen:  Blood from Antecubital, Left Updated:  05/24/18 1212     Blood Culture, Routine No Growth to date     Blood Culture, Routine No Growth to date    Blood culture [[id number]] Collected:  05/22/18 0453    Order Status:  Completed Specimen:  Blood from Antecubital, Right  Arm Updated:  05/24/18 0942     Blood Culture, Routine Gram stain aer bottle: Gram positive cocci in clusters resembling Staph      Blood Culture, Routine Results called to and read back by:Jigna Rios RN 05/23/2018  11:31     Blood Culture, Routine --     STAPHYLOCOCCUS AUREUS  Susceptibility pending  ID consult required at [facility] and [facility].      Blood culture [[id number]] Collected:  05/19/18 1356    Order Status:  Completed Specimen:  Blood Updated:  05/22/18 1454     Blood Culture, Routine Gram stain aer bottle: Gram positive cocci in clusters resembling Staph      Blood Culture, Routine Results called to and read back by: Vivian Castillo RN  05/20/2018  22:34      Blood Culture, Routine --     STAPHYLOCOCCUS AUREUS  ID consult required at [facility].  For susceptibility see order #[id number]      Narrative:       Blood cultures x 2    Blood culture [[id number]]  (Susceptibility) Collected:  05/19/18 1346    Order Status:  Completed Specimen:  Blood Updated:  05/22/18 1454     Blood Culture, Routine Gram stain aer bottle: Gram positive cocci in clusters resembling Staph      Blood Culture, Routine Results called to and read back by: Radha Mendoza RN  05/20/2018  19:40     Blood Culture, Routine --     STAPHYLOCOCCUS AUREUS  ID consult required at [facility].      Narrative:       X 2 sets    Culture, Respiratory with Gram Stain [[id number]] Collected:  05/20/18 1056    Order Status:  Completed Specimen:  Respiratory from Sputum Updated:  05/22/18 1255     Respiratory Culture Normal respiratory sharron     Gram Stain (Respiratory) <10 epithelial cells per low power field.     Gram Stain (Respiratory) Many WBC's     Gram Stain (Respiratory) Rare Gram positive cocci    Blood culture [[id number]] Collected:  05/17/18 1945    Order Status:  Completed Specimen:  Blood Updated:  05/20/18 1310     Blood Culture, Routine Gram stain aer bottle: Gram positive cocci in clusters resembling Staph      Blood Culture, Routine Gram stain sharon bottle: Gram positive cocci in clusters resembling Staph      Blood Culture, Routine Results called to and read back by: Renny Hu RN 05/18/2018       Blood Culture, Routine 17:32     Blood Culture, Routine --     STAPHYLOCOCCUS AUREUS  ID consult required at [facility].  For susceptibility see order #[id number]      Blood culture [[id number]]  (Susceptibility) Collected:  05/17/18 1940    Order Status:  Completed Specimen:  Blood from Antecubital, Right  Arm Updated:  05/20/18 1032     Blood Culture, Routine Gram stain aer bottle: Gram positive cocci in clusters  resembling Staph      Blood Culture, Routine Gram stain sharon bottle: Gram positive cocci in clusters resembling Staph      Blood Culture, Routine Results called to and read back by: Renny Hu RN 05/18/2018       Blood Culture, Routine 17:32     Blood Culture, Routine --     STAPHYLOCOCCUS AUREUS  ID consult required at [facility],[place] and [facility] locations.      Urine culture [[id number]] Collected:  05/18/18 1130    Order Status:  Completed Specimen:  Urine from Urine, Clean Catch Updated:  05/19/18 2147     Urine Culture, Routine No growth         CXR: New left lower lobe pneumonia.  Cardiomegaly.  Prominence of the pulmonary vasculature may represent borderline heart function.    CXR: Congestive heart failure with mild to moderate pulmonary edema.  Additional dense infiltrate in the left lower lobe consistent with pneumonia.    CXR: Resolving left lower lobe pneumonia.  Continued moderate pulmonary edema and cardiomegaly consistent with congestive heart failure.    CT chest without contrast:  Patchy bilateral upper lobe interstitial infiltrates.  The upper lobe predominance suggest sarcoidosis or a rather atypical infectious process.  Cavitation, pleural thickening or other evidence of tuberculosis is not seen.  Small right pleural effusion and trace left pleural effusion is noted with moderate atelectasis at both lung bases.  Consolidation at the left lung base is not seen.  Mild cardiomegaly.  Coronary artery calcification.    Bone scan: There is no scintigraphic evidence of metastatic disease or osseous destruction.    ECHO:  · Left atrium is severely dilated.  · LVEF appears to be normal      Performing Sonographer     Vanita Guzman    Reason For Exam   Priority: Routine   Dx: Acute respiratory failure with hypoxemia [J96.01 (ICD-10-CM)]      Vitals     Height Weight BMI (Calculated) BSA (Calculated - sq m) BP   6' (1.829 m) 134.3 kg (296 lb) 40.2 2.61 sq meters 167/73       Study Details     A  complete echo was performed using spectral Doppler. Overall the study quality was poor. The study had technical difficulties. The study was difficult due to patient's body habitus.   Myocardial Findings     Left Ventricle ejection fraction at 65%. Normal left ventricle diastolic function. Normal left atrial pressure.      Right Ventricle Normal ejection fraction.      Left Atrium Cavity is severely dilated.        CT abdomen and pelvis with contrast:  Bilateral small pleural effusions and basilar atelectasis slightly increased in size from the prior study.  Chronic degenerative disc disease from L2-L5.  Mild prominence of the left adrenal gland without a focal mass seen.  Atherosclerosis.    CXR: Cardiomegaly with moderately increased interstitial markings bilaterally, most compatible with CHF.  This is slightly improved relative to 05/22/2018.  Suspected small pleural effusions also noted.  Continued radiographic follow-up recommended.    Assessment/Plan:     Acute hypoxemic respiratory failure  Community acquired pneumonia of left lower lobe of lung  Staph Aureus Pneumonia, left lower lobe  Staph Aureus Bacteremia  Supplemental O2 via nasal canula; titrate O2 saturation to >92%. Use BiPAP as needed.  Follow pulmonary recommendations.   Continue beta 2 agonist bronchodilator treatments.   Continue IV antibiotics - Ancef 2 gram IV q 18h. Pharmacist to dose Vancomycin. Follow ID recommendations. Patient would need 6 weeks of IV antibiotics.   Check sputum GS and Cx.   Continue routine medications as before.   Whole body scan results reviewed.  2 D ECHO results reviewed which is sub-optimal. Discussed with Dr. SALVATORE Montes for EDUAR, patient's respiratory status not staus for EDUAR at this time, may require anesthesiologist assistance.  Patient could not get MRI of back due to body habitus.   Follow CT abdomen and pelvis results.                                                   Type 2 diabetes mellitus with hyperglycemia        Chronic problem.  Uncontrolled hyperglycemia upon admission labs.    Check blood glucose level q AC/HS.  Use Novolog Insulin Sliding Scale as needed.   Continue American Diabetic Association 1800 Kcal diet.            UTI            Follow urine C/S. Continue IV antibiotics.                   ZACHARY (acute kidney injury)       Improved on IVF's.  IVF hydration.  Follow renal panel and electrolytes closely.  Adjust renal dose medications for Estimated Creatinine Clearance: 66.9 mL/min (based on SCr of 1.4 mg/dL).  Avoid nonessential nephrotoxic agents.           Tobacco abuse       Dangers of cigarette smoking were reviewed with patient in detail for 3 minutes and patient was encouraged to quit. Nicotine replacement options were discussed - option to use Nicoderm.           Alcohol use       Reportedly drinks etoh daily without history of DT's  Reduce Librium use.  Will utilize multivitamins  Educated on the deleterious effects of alcohol use  Seizure precautions with prn ativan   Fall precautions           HTN (hypertension)       Chronic problem.  Labile.  Continue home antihypertensive regimen and monitor b/p closely.  Adjust medications as necessary.           CAD (coronary artery disease)       Historical diagnosis with stent placement.  Did not present with s/s of ACS.  Will continue home BB, ASA, and Statin therapy.  Cardiac monitoring  Monitor closely for s/s of ACS.           Paroxysmal atrial fibrillation       S/P cardioversion.  Since patient has been in NSR throughout hospitalization, will reduce Lovenox 40 mg sq q day. Discussed with Dr. SALVATORE Montes.  Continue home BB, Amiodarone.  Cardiac monitoring.  Patient appears volume overloaded and has rales and CXR is suggesting central venous congestion. Start IV lasix 40 mg bid # 4 doses.        Discussed with daughter, brother and wife. Patient not interested in LTAC. They have multiple questions. Time spent in care of the patient, counseling and coordination  of care (Greater than 50% spent in direct face to face contact): 35 min.    VTE Risk Mitigation         Ordered     enoxaparin injection 40 mg  Daily      05/25/18 0614     IP VTE LOW RISK PATIENT  Once      05/17/18 1891        Erik Granados MD  Department of Hospital Medicine   [facility]

## 2018-05-25 NOTE — PROGRESS NOTES
Progress Note  Pulmonary/Critical Care Medicine    Admit Date: 5/17/2018    SUBJECTIVE:     Follow-up For:  MSSA Sepsis    Review of Systems:  Constitutional: no fever or chills, doesn't feel well  Respiratory: positive for cough and dyspnea   Cardiovascular: no chest pain or palpitations  Gastrointestinal: no nausea or vomiting, no abdominal pain or change in bowel habits  Extremities: Legs are always swollen    OBJECTIVE:   The patient is sitting on a chair on nasal cannula. He is very lethargic and falls asleep during my conversation. His brother is in the room and states that the patient is normally awake and alert and interactive. The brother is concerned that the Librium he is getting for his alcohol consumption has made in this lethargic.  Vital Signs (Most Recent)  Temp: 100.2 °F (37.9 °C) (05/25/18 1116)  Pulse: 67 (05/25/18 1128)  Resp: 16 (05/25/18 1128)  BP: (!) 168/72 (05/25/18 1116)  SpO2: 97 % (05/25/18 1128)    I & O (Last 24H):  Intake/Output Summary (Last 24 hours) at 05/25/18 1346  Last data filed at 05/25/18 0534   Gross per 24 hour   Intake              620 ml   Output              900 ml   Net             -280 ml       Physical Exam:  General: well developed, well nourished,obese.  HENT: Head:normocephalic, atraumatic.    Nose: Nares normal. Septum midline.   Throat: lips, mucosa.  Neck: supple, symmetrical  Lungs:  crackles to bases,expiratory wheezes heard throughout the posterior lung fields as well.There are crackles anteriorly on the left.  Cardiovascular: Heart: regular rate and rhythm, S1, S2 normal  Chest Wall: no tenderness.   Extremities: no cyanosis,the legs have 3+ edema,.   Pulses: 2+ and symmetric.   Abdomen: soft,mildly tender, obese  Musculoskeletal:no clubbing, cyanosis, muscle masses appropriate  Neurologic: Normal strength and tone. No focal numbness or weakness.The patient is lethargic.  Psych/Behavioral:  too lethargic to assess    Laboratory:  CBC:    Recent Labs  Lab  05/25/18  0422   WBC 7.20   RBC 2.74*   HGB 9.2*   HCT 27.2*   *   MCV 99*   MCH 33.5*   MCHC 33.8     CMP    Recent Labs  Lab 05/25/18  0422   CALCIUM 9.4   *   K 3.8   CO2 27   CL 98   BUN 25*   CREATININE 1.2     Blood no growth  Blood with MSSA from right AC  Sputum- normal sharron     Ancef  ABG on 40% shows a pH is 7.49, PCO2 37, PaO2 75, bicarbonate of 28, saturation 96%  Oxygen Concentration (%):  [40] 40  DiagnostiLabs: Reviewed  X-Ray: Reviewed  PICC in good position.  Increasing infiltrate throughout the left lung increased interstitial density on the right.  Mild cardiomegaly.  ASSESSMENT/PLAN:   Staph aureus Bacteremia  Pulmonary edema  COPD  WANDA  Alcohol abuse  Lethargy    Continue BiPAP as needed  Continue Lasix 40 IV every 12  Continue oxygen  Antibiotics per ID

## 2018-05-25 NOTE — PROGRESS NOTES
[facility]  Adult Nutrition  Progress Note    SUMMARY       Recommendations    Recommendation/Intervention: 1. Recommend adding Cardiac restriction to current diet order. 2. Will continue to monitor  Goals: Energy/protein needs to be met this admit  Nutrition Goal Status: new  Communication of RD Recs:  (POC, sticky note)    Reason for Assessment    Reason for Assessment: length of stay  Dx:  1. Community acquired pneumonia of left lower lobe of lung    2. Fever    3. Acute hypoxemic respiratory failure    4. Acute respiratory failure with hypoxemia    5. Severe sepsis    6. A-fib      Past Medical History:   Diagnosis Date    Atrial fibrillation     Colon polyp     Coronary artery disease     Diabetes mellitus     GERD (gastroesophageal reflux disease)     Hyperlipidemia     Hypertension        General Information Comments: Pt reports good appetite (consuming % meals), toelrating current diet. Pt had pain, nausea, poor PO at admit, but has since resolved.   Nutrition Discharge Planning: Diabetic/Cardiac diet    Nutrition Risk Screen    Nutrition Risk Screen: no indicators present    Nutrition/Diet History    Do you have any cultural, spiritual, Sikhism conflicts, given your current situation?: none    Anthropometrics    Temp: (!) 101.1 °F (38.4 °C)  Height Method: Stated  Height: 6'  Height (inches): 72 in  Weight Method: Standard Scale  Weight: 134.3 kg (296 lb)  Weight (lb): 296 lb  Ideal Body Weight (IBW), Male: 178 lb  % Ideal Body Weight, Male (lb): 166.29 lb  BMI (Calculated): 40.2  BMI Grade: greater than 40 - morbid obesity       Lab/Procedures/Meds    Pertinent Labs Reviewed: reviewed  Pertinent Medications Reviewed: reviewed  Lab Results   Component Value Date    HGB 9.2 (L) 05/25/2018     Lab Results   Component Value Date    HCT 27.2 (L) 05/25/2018     BMP  Lab Results   Component Value Date     (L) 05/25/2018    K 3.8 05/25/2018    CL 98 05/25/2018    CO2 27  05/25/2018    BUN 25 (H) 05/25/2018    CREATININE 1.2 05/25/2018    CALCIUM 9.4 05/25/2018    ANIONGAP 10 05/25/2018    ESTGFRAFRICA >60 05/25/2018    EGFRNONAA >60 05/25/2018     Lab Results   Component Value Date    CALCIUM 9.4 05/25/2018     Lab Results   Component Value Date    ALBUMIN 3.9 05/17/2018     POCT Glucose   Date Value Ref Range Status   05/25/2018 164 (H) 70 - 110 mg/dL Final   05/24/2018 159 (H) 70 - 110 mg/dL Final   05/24/2018 177 (H) 70 - 110 mg/dL Final   05/23/2018 172 (H) 70 - 110 mg/dL Final   05/23/2018 182 (H) 70 - 110 mg/dL Final   05/23/2018 163 (H) 70 - 110 mg/dL Final   05/22/2018 219 (H) 70 - 110 mg/dL Final   05/22/2018 160 (H) 70 - 110 mg/dL Final       Physical Findings/Assessment    Overall Physical Appearance: obese  Oral/Mouth Cavity: WDL  Skin:  (Jai=19)    Estimated/Assessed Needs    Weight Used For Calorie Calculations: 134.3 kg (296 lb 1.2 oz)  Energy Calorie Requirements (kcal): 2569  Energy Need Method: [place]-St Jeor (x1.2AF)  Protein Requirements: 108-162  Weight Used For Protein Calculations: 134.3 kg (296 lb 1.2 oz) (x0.8-1.2 g/kg)     Fluid Need Method: RDA Method (or per MD)  RDA Method (mL): 2569  CHO Requirement: 50% EEN      Nutrition Prescription Ordered    Current Diet Order: Diabetic 2000 kcal  Oral Nutrition Supplement: Boost Glucose Control w/ all meals    Evaluation of Received Nutrient/Fluid Intake    Energy Calories Required: meeting needs  Protein Required: meeting needs  Fluid Required: meeting needs  % Intake of Estimated Energy Needs: 75 - 100 %  % Meal Intake: 75 - 100 %    Nutrition Risk    Level of Risk/Frequency of Follow-up:  (FU 1x weekly)     Assessment and Plan    Nutrition Problem  Decreased nutrient needs (CHO)    Related to (etiology):   Type 2 Diabetes    Signs and Symptoms (as evidenced by):   POCT Glucose   Date Value Ref Range Status   05/25/2018 164 (H) 70 - 110 mg/dL Final   05/24/2018 159 (H) 70 - 110 mg/dL Final   05/24/2018 177  (H) 70 - 110 mg/dL Final   05/23/2018 172 (H) 70 - 110 mg/dL Final   05/23/2018 182 (H) 70 - 110 mg/dL Final   05/23/2018 163 (H) 70 - 110 mg/dL Final   05/22/2018 219 (H) 70 - 110 mg/dL Final   05/22/2018 160 (H) 70 - 110 mg/dL Final     Interventions/Recommendations (treatment strategy):  -Diabetic diet    Nutrition Diagnosis Status:   New         Monitor and Evaluation    Food and Nutrient Intake: energy intake, food and beverage intake  Food and Nutrient Adminstration: diet order  Knowledge/Beliefs/Attitudes: food and nutrition knowledge/skill  Physical Activity and Function: nutrition-related ADLs and IADLs  Anthropometric Measurements: weight  Biochemical Data, Medical Tests and Procedures: electrolyte and renal panel, gastrointestinal profile, glucose/endocrine profile, inflammatory profile, lipid profile  Nutrition-Focused Physical Findings: overall appearance     Nutrition Follow-Up    RD Follow-up?: Yes (1x weekly)

## 2018-05-25 NOTE — SIGNIFICANT EVENT
Results for MEKA MEYER (MRN [id number]) as of 5/25/2018 16:57   Ref. Range 5/25/2018 16:39   POC PH Latest Ref Range: 7.35 - 7.45  7.489 (H)   POC PCO2 Latest Ref Range: 35 - 45 mmHg 37.3   POC PO2 Latest Ref Range: 80 - 100 mmHg 75 (L)   POC BE Latest Ref Range: -2 to 2 mmol/L 5   POC HCO3 Latest Ref Range: 24 - 28 mmol/L 28.4 (H)   POC SATURATED O2 Latest Ref Range: 95 - 100 % 96   POC TCO2 Latest Ref Range: 23 - 27 mmol/L 29 (H)   Flow Unknown 6   Sample Unknown ARTERIAL   DelSys Unknown Nasal Can   Allens Test Unknown Pass   Site Unknown RR   Mode Unknown SPONT       Margarito FELIZ notified of results at time of testing .

## 2018-05-25 NOTE — PLAN OF CARE
Problem: Physical Therapy Goal  Goal: Physical Therapy Goal  Goals to be met by: 2018     Patient will increase functional independence with mobility by performin. Sit to stand transfer with Contact Guard Assistance  2. Bed to chair transfer with Contact Guard Assistance using Rolling Walker  3. Gait  x 250x2 feet with Contact Guard Assistance using Rolling Walker.   4. Lower extremity exercise program x20 reps per handout, with assistance as needed     Outcome: Ongoing (interventions implemented as appropriate)  PT for functional mob training; O2 at all times

## 2018-05-25 NOTE — PLAN OF CARE
Problem: Patient Care Overview  Goal: Plan of Care Review  Outcome: Ongoing (interventions implemented as appropriate)  Pt had a decent night free from fall or injury.  Pt remained oriented and sat up OOB in chair.  Pt tolerated BiPAP for the majority of the shift but asked to transfer to NC and tolerated well with o2 sats maintained.  Continuous pulse ox in use.  Tele monitor in place, BG monitored.  Pt outstanding for a MRI and EDUAR.  Tolerating PO.  FRANKO PICC in place and patent; no new skin breakdown noted. Pt continent of bladder utilizing BSC during shift and no BM noted. Pt and his spouse were updated on his POC and verbalized an understanding. Bedside rails raised x2 with call bell and bedside table within reach.  Nurse rounded hourly to ensure pt safety.

## 2018-05-26 LAB
ALBUMIN SERPL BCP-MCNC: 2 G/DL
ALP SERPL-CCNC: 67 U/L
ALT SERPL W/O P-5'-P-CCNC: 43 U/L
ANION GAP SERPL CALC-SCNC: 10 MMOL/L
AST SERPL-CCNC: 43 U/L
BACTERIA #/AREA URNS HPF: NORMAL /HPF
BASOPHILS # BLD AUTO: 0 K/UL
BASOPHILS NFR BLD: 0.1 %
BILIRUB SERPL-MCNC: 0.7 MG/DL
BILIRUB UR QL STRIP: NEGATIVE
BUN SERPL-MCNC: 24 MG/DL
CALCIUM SERPL-MCNC: 9.2 MG/DL
CHLORIDE SERPL-SCNC: 97 MMOL/L
CLARITY UR: CLEAR
CO2 SERPL-SCNC: 29 MMOL/L
COLOR UR: YELLOW
CREAT SERPL-MCNC: 1.2 MG/DL
CRP SERPL-MCNC: 221.7 MG/L
DIFFERENTIAL METHOD: ABNORMAL
EOSINOPHIL # BLD AUTO: 0 K/UL
EOSINOPHIL NFR BLD: 0.4 %
ERYTHROCYTE [DISTWIDTH] IN BLOOD BY AUTOMATED COUNT: 15.5 %
EST. GFR  (AFRICAN AMERICAN): >60 ML/MIN/1.73 M^2
EST. GFR  (NON AFRICAN AMERICAN): >60 ML/MIN/1.73 M^2
GLUCOSE SERPL-MCNC: 123 MG/DL
GLUCOSE UR QL STRIP: ABNORMAL
HCT VFR BLD AUTO: 26.2 %
HGB BLD-MCNC: 8.9 G/DL
HGB UR QL STRIP: ABNORMAL
HYALINE CASTS #/AREA URNS LPF: 0 /LPF
KETONES UR QL STRIP: NEGATIVE
LEUKOCYTE ESTERASE UR QL STRIP: NEGATIVE
LYMPHOCYTES # BLD AUTO: 1 K/UL
LYMPHOCYTES NFR BLD: 11.9 %
MCH RBC QN AUTO: 33.5 PG
MCHC RBC AUTO-ENTMCNC: 33.8 G/DL
MCV RBC AUTO: 99 FL
MICROSCOPIC COMMENT: NORMAL
MONOCYTES # BLD AUTO: 0.8 K/UL
MONOCYTES NFR BLD: 9.3 %
NEUTROPHILS # BLD AUTO: 6.7 K/UL
NEUTROPHILS NFR BLD: 78.3 %
NITRITE UR QL STRIP: NEGATIVE
PH UR STRIP: 6 [PH] (ref 5–8)
PLATELET # BLD AUTO: 106 K/UL
PMV BLD AUTO: 10.6 FL
POCT GLUCOSE: 119 MG/DL (ref 70–110)
POCT GLUCOSE: 133 MG/DL (ref 70–110)
POCT GLUCOSE: 164 MG/DL (ref 70–110)
POCT GLUCOSE: 170 MG/DL (ref 70–110)
POTASSIUM SERPL-SCNC: 3.6 MMOL/L
PROT SERPL-MCNC: 6.4 G/DL
PROT UR QL STRIP: ABNORMAL
RBC # BLD AUTO: 2.64 M/UL
RBC #/AREA URNS HPF: 2 /HPF (ref 0–4)
SODIUM SERPL-SCNC: 136 MMOL/L
SP GR UR STRIP: 1.02 (ref 1–1.03)
SQUAMOUS #/AREA URNS HPF: 1 /HPF
URN SPEC COLLECT METH UR: ABNORMAL
UROBILINOGEN UR STRIP-ACNC: NEGATIVE EU/DL
WBC # BLD AUTO: 8.6 K/UL
WBC #/AREA URNS HPF: 1 /HPF (ref 0–5)

## 2018-05-26 PROCEDURE — 63600175 PHARM REV CODE 636 W HCPCS: Performed by: INTERNAL MEDICINE

## 2018-05-26 PROCEDURE — 94660 CPAP INITIATION&MGMT: CPT

## 2018-05-26 PROCEDURE — 12000002 HC ACUTE/MED SURGE SEMI-PRIVATE ROOM

## 2018-05-26 PROCEDURE — 81000 URINALYSIS NONAUTO W/SCOPE: CPT

## 2018-05-26 PROCEDURE — 99900035 HC TECH TIME PER 15 MIN (STAT)

## 2018-05-26 PROCEDURE — 86140 C-REACTIVE PROTEIN: CPT

## 2018-05-26 PROCEDURE — A4216 STERILE WATER/SALINE, 10 ML: HCPCS | Performed by: INTERNAL MEDICINE

## 2018-05-26 PROCEDURE — 80053 COMPREHEN METABOLIC PANEL: CPT

## 2018-05-26 PROCEDURE — 25000003 PHARM REV CODE 250: Performed by: INTERNAL MEDICINE

## 2018-05-26 PROCEDURE — 87086 URINE CULTURE/COLONY COUNT: CPT

## 2018-05-26 PROCEDURE — 25000003 PHARM REV CODE 250: Performed by: EMERGENCY MEDICINE

## 2018-05-26 PROCEDURE — 85025 COMPLETE CBC W/AUTO DIFF WBC: CPT

## 2018-05-26 PROCEDURE — S4991 NICOTINE PATCH NONLEGEND: HCPCS | Performed by: NURSE PRACTITIONER

## 2018-05-26 PROCEDURE — 36415 COLL VENOUS BLD VENIPUNCTURE: CPT

## 2018-05-26 PROCEDURE — 25000003 PHARM REV CODE 250: Performed by: NURSE PRACTITIONER

## 2018-05-26 PROCEDURE — 25000242 PHARM REV CODE 250 ALT 637 W/ HCPCS: Performed by: NURSE PRACTITIONER

## 2018-05-26 PROCEDURE — 94640 AIRWAY INHALATION TREATMENT: CPT

## 2018-05-26 RX ORDER — FUROSEMIDE 10 MG/ML
40 INJECTION INTRAMUSCULAR; INTRAVENOUS ONCE
Status: DISCONTINUED | OUTPATIENT
Start: 2018-05-26 | End: 2018-05-26

## 2018-05-26 RX ORDER — FLUCONAZOLE 100 MG/1
200 TABLET ORAL ONCE
Status: COMPLETED | OUTPATIENT
Start: 2018-05-26 | End: 2018-05-27

## 2018-05-26 RX ADMIN — CEFAZOLIN SODIUM 2 G: 2 SOLUTION INTRAVENOUS at 02:05

## 2018-05-26 RX ADMIN — NYSTATIN 500000 UNITS: 100000 SUSPENSION ORAL at 02:05

## 2018-05-26 RX ADMIN — IPRATROPIUM BROMIDE AND ALBUTEROL SULFATE 3 ML: .5; 3 SOLUTION RESPIRATORY (INHALATION) at 03:05

## 2018-05-26 RX ADMIN — SODIUM CHLORIDE, PRESERVATIVE FREE 10 ML: 5 INJECTION INTRAVENOUS at 02:05

## 2018-05-26 RX ADMIN — MONTELUKAST SODIUM 10 MG: 10 TABLET, FILM COATED ORAL at 09:05

## 2018-05-26 RX ADMIN — LACTOBACILLUS ACIDOPHILUS / LACTOBACILLUS BULGARICUS 1 EACH: 100 MILLION CFU STRENGTH GRANULES at 10:05

## 2018-05-26 RX ADMIN — IPRATROPIUM BROMIDE AND ALBUTEROL SULFATE 3 ML: .5; 3 SOLUTION RESPIRATORY (INHALATION) at 07:05

## 2018-05-26 RX ADMIN — Medication 1 TABLET: at 10:05

## 2018-05-26 RX ADMIN — METOPROLOL SUCCINATE 25 MG: 25 TABLET, EXTENDED RELEASE ORAL at 10:05

## 2018-05-26 RX ADMIN — ACETAMINOPHEN 650 MG: 325 TABLET, FILM COATED ORAL at 09:05

## 2018-05-26 RX ADMIN — IPRATROPIUM BROMIDE AND ALBUTEROL SULFATE 3 ML: .5; 3 SOLUTION RESPIRATORY (INHALATION) at 12:05

## 2018-05-26 RX ADMIN — FUROSEMIDE 40 MG: 10 INJECTION, SOLUTION INTRAVENOUS at 05:05

## 2018-05-26 RX ADMIN — FUROSEMIDE 40 MG: 10 INJECTION, SOLUTION INTRAVENOUS at 09:05

## 2018-05-26 RX ADMIN — NYSTATIN 500000 UNITS: 100000 SUSPENSION ORAL at 10:05

## 2018-05-26 RX ADMIN — ENOXAPARIN SODIUM 40 MG: 100 INJECTION SUBCUTANEOUS at 06:05

## 2018-05-26 RX ADMIN — IPRATROPIUM BROMIDE AND ALBUTEROL SULFATE 3 ML: .5; 3 SOLUTION RESPIRATORY (INHALATION) at 04:05

## 2018-05-26 RX ADMIN — NICOTINE 1 PATCH: 14 PATCH, EXTENDED RELEASE TRANSDERMAL at 09:05

## 2018-05-26 RX ADMIN — ASPIRIN 325 MG ORAL TABLET 325 MG: 325 PILL ORAL at 10:05

## 2018-05-26 RX ADMIN — NYSTATIN 500000 UNITS: 100000 SUSPENSION ORAL at 06:05

## 2018-05-26 RX ADMIN — AMIODARONE HYDROCHLORIDE 200 MG: 200 TABLET ORAL at 10:05

## 2018-05-26 RX ADMIN — CEFAZOLIN SODIUM 2 G: 2 SOLUTION INTRAVENOUS at 09:05

## 2018-05-26 RX ADMIN — BENAZEPRIL HYDROCHLORIDE 40 MG: 10 TABLET, FILM COATED ORAL at 10:05

## 2018-05-26 RX ADMIN — GABAPENTIN 300 MG: 300 CAPSULE ORAL at 02:05

## 2018-05-26 RX ADMIN — CHLORDIAZEPOXIDE HYDROCHLORIDE 5 MG: 5 CAPSULE ORAL at 10:05

## 2018-05-26 RX ADMIN — GABAPENTIN 300 MG: 300 CAPSULE ORAL at 10:05

## 2018-05-26 RX ADMIN — LACTOBACILLUS ACIDOPHILUS / LACTOBACILLUS BULGARICUS 1 EACH: 100 MILLION CFU STRENGTH GRANULES at 09:05

## 2018-05-26 RX ADMIN — CEFAZOLIN SODIUM 2 G: 2 SOLUTION INTRAVENOUS at 05:05

## 2018-05-26 RX ADMIN — GABAPENTIN 300 MG: 300 CAPSULE ORAL at 09:05

## 2018-05-26 RX ADMIN — IPRATROPIUM BROMIDE AND ALBUTEROL SULFATE 3 ML: .5; 3 SOLUTION RESPIRATORY (INHALATION) at 11:05

## 2018-05-26 RX ADMIN — CHLORDIAZEPOXIDE HYDROCHLORIDE 5 MG: 5 CAPSULE ORAL at 09:05

## 2018-05-26 RX ADMIN — LIDOCAINE 1 PATCH: 50 PATCH TOPICAL at 06:05

## 2018-05-26 RX ADMIN — SODIUM CHLORIDE, PRESERVATIVE FREE 10 ML: 5 INJECTION INTRAVENOUS at 06:05

## 2018-05-26 RX ADMIN — AMLODIPINE BESYLATE 10 MG: 5 TABLET ORAL at 10:05

## 2018-05-26 RX ADMIN — TAMSULOSIN HYDROCHLORIDE 0.4 MG: 0.4 CAPSULE ORAL at 10:05

## 2018-05-26 RX ADMIN — THIAMINE HCL TAB 100 MG 100 MG: 100 TAB at 10:05

## 2018-05-26 RX ADMIN — NYSTATIN 500000 UNITS: 100000 SUSPENSION ORAL at 09:05

## 2018-05-26 RX ADMIN — PANTOPRAZOLE SODIUM 40 MG: 40 TABLET, DELAYED RELEASE ORAL at 10:05

## 2018-05-26 RX ADMIN — ATORVASTATIN CALCIUM 40 MG: 40 TABLET, FILM COATED ORAL at 10:05

## 2018-05-26 NOTE — PROGRESS NOTES
Progress Note  Infectious Disease    Follow-up For:  MSSA sepsis    SUBJECTIVE:   ROS:   5/21: Still has fever. Blood cultures from 5/19 positive. CT chest and bone scan noted and reviewed with wife, patient. TTE suboptimal to exclude endocarditis. Complains of right subscapular pain. Complains of constipation. Awaiting Mgcitrate to have its effect.   5/22: no fever today. MRI could not be done due to girth. Pain that was right subscapular is now right subcostal. He coughs, but produces no sputum. No nausea or vomiting but appetite is poor. Had 2 BMs after Mgcitrate. EDUAR will be done when respiratory status is im proved. Tolerates bipap poorly but mask adjusted again today by RT. Voiding without difficulty. Per RN, wife is not in favor of the librium due to intermittent confusion, but he is receiving it.   5/23: spoke with patient's dermatologist. Last visit was 9/2017 for dishydrotic eczema. This is too far in the past to associate with this illness. Blood cultures are still positive. Ct abd with no foci for bacteremia. CXR still looking wet and appreciate Dr. Walker ordering diuretic. No new visual, respi, GI,  , MSK or skin symptoms. A little more sedated and confused today, which may be from disturbed sleep on CPAP plus librium.   5/24: blood cultures from yesterday negative so far. CXR is improved with decreased fluid. IV lasix ordered by Dr. Montes. Mentation is a little better today, but looks depressed and not eating well. Ambulates with PT. PICC line was inserted. No new MSK complaints. No cigarettes x 7d. Discussed potential for transition to LTAC prior to home but wife really wants to take him home.   5/25: was not aware of last night or tonight's fever until just now. WBC remain normal. CXR continues to look wet. Coughing up a little yellow sputum. Able to wear CPAP last night. Up at 330 am, had coffee, shower, shave, breakfast and then knocked out again from librium. Dose decreased fo rthis pm. No  diarrhea, chest pain,n ausea, dysuria, rash, IV site problem,     Antibiotics     Start     Stop Route Frequency Ordered    05/21/18 1400  cefazolin (ANCEF) 2 gram in dextrose 5% 50 mL IVPB (premix)      -- IV Every 8 hours (non-standard times) 05/21/18 1245          Pertinent Medications noted:    EXAM & DIAGNOSTICS REVIEWED:   Vitals:     Temp:  [99.3 °F (37.4 °C)-101.3 °F (38.5 °C)]   Temp: (!) 101.1 °F (38.4 °C) (05/25/18 1550)  Pulse: 73 (05/25/18 1550)  Resp: 16 (05/25/18 1550)  BP: (!) 182/77 (05/25/18 1550)  SpO2: (!) 94 % (05/25/18 1550)    Intake/Output Summary (Last 24 hours) at 05/25/18 1948  Last data filed at 05/25/18 0534   Gross per 24 hour   Intake              620 ml   Output              900 ml   Net             -280 ml       General:  In NAD. Looks fairly comfortable, answering questions reasonably well  Eyes:  Anicteric, PERRL, EOMI  ENT:  Mouth w/ pink MMM, no lesions/exudate,    Neck:  Trachea midline, supple, no adenopathy appreciated  Lungs:  Decreased Bs with basilar  crackles, Bilaterally.no change. Wet cough  Heart:  RRR, no gallop/rub, ?soft systolic murmur.  tachy.   Abd:  soft, NT, ND, normal BS, no masses/organomegaly appreciated. protuberant  :  Voids but not able to handle to urinal.   Musc:  Joints without effusion, erythema, synovitis,   Skin:  Generally warm, dry, normal for color. No rashes. No septic skin lesions  Wound:   Neuro: AAOx3, speech clear, moves all extrems equally. Fairly appropriate at present  Extrem: No palpable edema, erythema, phlebitis, cellulitis, synovitis  VAD:  picc    Lines/Tubes/Drains:    General Labs reviewed:    Recent Labs  Lab 05/25/18 0422   WBC 7.20   RBC 2.74*   HGB 9.2*   HCT 27.2*   *   MCV 99*   MCH 33.5*   MCHC 33.8       Recent Labs  Lab 05/25/18 0422   CALCIUM 9.4   *   K 3.8   CO2 27   CL 98   BUN 25*   CREATININE 1.2       Micro: MSSA from blood cultures 5/17, 5/19, 5/22 with 5/23 negative so far  Sputum culture: normal  sharron  Urine culture negative    Imaging Reviewed: CT Chest  Impression   Patchy bilateral upper lobe interstitial infiltrates.  The upper lobe predominance suggest sarcoidosis or a rather atypical infectious process.  Cavitation, pleural thickening or other evidence of tuberculosis is not seen.  Small right pleural effusion and trace left pleural effusion is noted with moderate atelectasis at both lung bases.  Consolidation at the left lung base is not seen.  Mild cardiomegaly.  Coronary artery calcification.     Bone scan: 5/20  Impression   There is no scintigraphic evidence of metastatic disease or osseous destruction..   MRI thoracic spine could not be done due to girth.    Abdomen/pelvis Ct 5/23  Impression   Bilateral small pleural effusions and basilar atelectasis slightly increased in size from the prior study.  Chronic degenerative disc disease from L2-L5.  Mild prominence of the left adrenal gland without a focal mass seen.  Atherosclerosis         ASSESSMENT & PLAN      Patient Active Problem List   Diagnosis    BPH (benign prostatic hypertrophy)    Nocturia    Testicular hypofunction    Community acquired pneumonia of left lower lobe of lung    Severe sepsis    Acute hypoxemic respiratory failure    Paroxysmal atrial fibrillation    CAD (coronary artery disease)    HTN (hypertension)    Type 2 diabetes mellitus with hyperglycemia    Alcohol use    Tobacco abuse    ZACHARY (acute kidney injury)    Obstructive sleep apnea    COPD with acute lower respiratory infection   1.         Staph aureus bacteremia(MSSA) source unclear. CT chest looks more like fluid than pneumonia to me , persistent positive cultures. Increase in fever. Increase in cough. ?aspiration.   2.         Pulmonary edema, improved, requiring bipap intermittently  3.         Alcohol abuse, with suspect withdrawal, on librium with intermittent confusion  4.         Diabetes  5.         TKA right, and SQ loop recorder in  situ  6. Mid back pain(resolved) and more evident now anterior costal margain, negative bone scan,    Recommendation:   Blood, urine, sputum culture  WBC scan  Ancef 2 g IV 18  EDUAR when improved from a pulmonary standpoint, ?next week  Continue to wean librium    Dr. mendenhall covering this weekend

## 2018-05-26 NOTE — PLAN OF CARE
Problem: Patient Care Overview  Goal: Plan of Care Review  Outcome: Ongoing (interventions implemented as appropriate)  Pt up in chair, easily rerdirected with attempted inappropriate behavior AEB I monitor res throw cup of water onto the floor, extensive teaching and redirected provided, family at bedside, pt appear slow to respond when questons or being askedno seizure activity this shift, pt is compliant with care, sputum specimen has been rejected, awaiting another specimen, remains afebrile while receiving antibiotic therapy, ambulates with walker x 1 person assist,  will continue to monitor, observe and note any changes, safety maintain

## 2018-05-26 NOTE — PROGRESS NOTES
Progress Note  Pulmonary/Critical Care Medicine    Admit Date: 5/17/2018    SUBJECTIVE:     Follow-up For:  MSSA Sepsis    Review of Systems:  Constitutional: no fever or chills, doesn't feel well  Respiratory: positive for cough and dyspnea   Cardiovascular: no chest pain or palpitations  Gastrointestinal: no nausea or vomiting, no abdominal pain or change in bowel habits  Extremities: Legs are always swollen    OBJECTIVE:   The patient is sitting on a chair on high flow nasal cannula.He states that he slept on his BiPAP last night> His wife is present and perceives his lethargy to be secondary to his librium.  Vital Signs (Most Recent)  Temp: 99.4 °F (37.4 °C) (05/26/18 0809)  Pulse: 69 (05/26/18 0809)  Resp: 18 (05/26/18 0809)  BP: (!) 180/78 (05/26/18 0809)  SpO2: 97 % (05/26/18 0809)    I & O (Last 24H):    Intake/Output Summary (Last 24 hours) at 05/26/18 0850  Last data filed at 05/26/18 0439   Gross per 24 hour   Intake               50 ml   Output              375 ml   Net             -325 ml       Physical Exam:  General: well developed, well nourished,obese.  HENT: Head:normocephalic, atraumatic.    Nose: Nares normal. Septum midline.   Throat: lips, mucosa.  Neck: supple, symmetrical  Lungs:  crackles to bases,expiratory wheezes heard throughout the posterior lung fields as well.There are crackles anteriorly on the left.  Cardiovascular: Heart: regular rate and rhythm, S1, S2 normal  Chest Wall: no tenderness.   Extremities: no cyanosis,the legs have 3+ edema,.   Pulses: 2+ and symmetric.   Abdomen: soft,mildly tender, obese  Musculoskeletal:no clubbing, cyanosis, muscle masses appropriate  Neurologic: Normal strength and tone. No focal numbness or weakness.The patient is lethargic.  Psych/Behavioral:  too lethargic to assess    Laboratory:  CBC:    Recent Labs  Lab 05/26/18  0550   WBC 8.60   RBC 2.64*   HGB 8.9*   HCT 26.2*   *   MCV 99*   MCH 33.5*   MCHC 33.8     CMP    Recent Labs  Lab  05/26/18  0550   CALCIUM 9.2   ALBUMIN 2.0*   PROT 6.4      K 3.6   CO2 29   CL 97   BUN 24*   CREATININE 1.2   ALKPHOS 67   ALT 43   AST 43*   BILITOT 0.7     Blood no growth  Blood with MSSA from right AC  Sputum- normal sharron     Ancef    DiagnostiLabs: Reviewed  X-Ray: Reviewed  Bilateral infiltrates left more so than right, decreased in the perihilar region but increased basilar particularly on the right compared to the prior exam  ASSESSMENT/PLAN:   Staph aureus Bacteremia  Pulmonary edema  COPD  WANDA  Alcohol abuse  Lethargy better    Continue oxygen  Continue Bipap to sleep  Continue bronchodilators  Continue antibiotics

## 2018-05-26 NOTE — PLAN OF CARE
05/25/18 2000   Patient Assessment/Suction   Level of Consciousness (AVPU) alert   Respiratory Effort Normal;Unlabored   Expansion/Accessory Muscles/Retractions expansion symmetric;no retractions;no use of accessory muscles   All Lung Fields Breath Sounds diminished   Cough Type nonproductive   PRE-TX-O2-ETCO2   O2 Device (Oxygen Therapy) High Flow nasal Cannula   Flow (L/min) 6   SpO2 (!) 93 %   Pulse Oximetry Type Continuous   Pulse 73   Resp 20   Temp 98.4 °F (36.9 °C)   BP (!) 167/70   Aerosol Therapy   $ Aerosol Therapy Charges Aerosol Treatment   Respiratory Treatment Status given   SVN/Inhaler Treatment Route mask;with oxygen   Position During Treatment Sitting on edge of bed (dangling)   Patient Tolerance good   Post-Treatment   Post-treatment Heart Rate (beats/min) 70   Post-treatment Resp Rate (breaths/min) 18   All Fields Breath Sounds unchanged

## 2018-05-26 NOTE — PROGRESS NOTES
[facility] medicine    CC: SOB    HPI 72 y.o. male seen and examined with no acute events. More alert today. Vitals stable.       Past Medical History:   Diagnosis Date    Atrial fibrillation     Colon polyp     Coronary artery disease     Diabetes mellitus     GERD (gastroesophageal reflux disease)     Hyperlipidemia     Hypertension          Review of Systems  General ROS: negative for chills, fever or weight loss  Cardiovascular ROS: no chest pain; positive for shortness of breath although improved.   Gastrointestinal ROS: no abdominal pain, change in bowel habits, or black/ bloody stools    Physical Examination  BP (!) 150/67 (BP Location: Left arm, Patient Position: Sitting)   Pulse 64   Temp 98.4 °F (36.9 °C) (Oral)   Resp 20   Ht 6' (1.829 m)   Wt 131.9 kg (290 lb 12.6 oz)   SpO2 (!) 94%   BMI 39.44 kg/m²   General appearance: alert, cooperative, no distress; sitting up in chair  HENT: Normocephalic, atraumatic, neck symmetrical, no nasal discharge   Lungs: crackles bilaterally, no dullness to percussion bilaterally  Heart: regular rate and rhythm without rub; no displacement of the PMI   Abdomen: soft, non-tender; bowel sounds normoactive; no organomegaly  Extremities: extremities symmetric; no clubbing, cyanosis, or edema  Neurologic: Alert and oriented X 3, normal strength, normal coordination and gait    Labs:  Lab Results   Component Value Date    WBC 8.60 05/26/2018    HGB 8.9 (L) 05/26/2018    HCT 26.2 (L) 05/26/2018    MCV 99 (H) 05/26/2018     (L) 05/26/2018         Imaging: No new imaging    Assessment and Plan:    Acute hypoxemic respiratory failure  Community acquired pneumonia of left lower lobe of lung  Staph Aureus Pneumonia, left lower lobe  Staph Aureus Bacteremia  Supplemental O2 via nasal canula; titrate O2 saturation to >92%. Use BiPAP as needed.  Follow pulmonary recommendations.   Continue beta 2 agonist bronchodilator treatments.   Continue IV antibiotics - Ancef  given MSSA bacteremia  Follow up most recent cultures  Continue routine medications as before.   Continue Lasix 40 mg IV BID                                                    Type 2 diabetes mellitus with hyperglycemia       Chronic problem.  Uncontrolled hyperglycemia upon admission labs.    Check blood glucose level q AC/HS.  Use Novolog Insulin Sliding Scale as needed.   Continue American Diabetic Association 1800 Kcal diet.            UTI            Follow urine C/S. Continue IV antibiotics.                   ZACHARY (acute kidney injury)       Resolved           Tobacco abuse       Dangers of cigarette smoking were reviewed with patient in detail for 3 minutes and patient was encouraged to quit. Nicotine replacement options were discussed - option to use Nicoderm.           Alcohol use       Librium reduced to BID dosing.  Should be out of window for withdrawals           HTN (hypertension)       Chronic problem.  Labile.  Continue home antihypertensive regimen and monitor b/p closely.  Adjust medications as necessary.           CAD (coronary artery disease)       Historical diagnosis with stent placement.  Did not present with s/s of ACS.  Will continue home BB, ASA, and Statin therapy.  Cardiac monitoring  Monitor closely for s/s of ACS.           Paroxysmal atrial fibrillation       S/P cardioversion.  Since patient has been in NSR throughout hospitalization, will reduce Lovenox 40 mg sq q day. Discussed with Dr. SALVATORE Montes.  Continue home BB, Amiodarone.  Cardiac monitoring.

## 2018-05-27 LAB
ALBUMIN SERPL BCP-MCNC: 2 G/DL
ALP SERPL-CCNC: 82 U/L
ALT SERPL W/O P-5'-P-CCNC: 47 U/L
ANION GAP SERPL CALC-SCNC: 10 MMOL/L
AST SERPL-CCNC: 51 U/L
BACTERIA UR CULT: NO GROWTH
BASOPHILS # BLD AUTO: 0.4 K/UL
BASOPHILS NFR BLD: 4.1 %
BILIRUB SERPL-MCNC: 0.5 MG/DL
BUN SERPL-MCNC: 24 MG/DL
CALCIUM SERPL-MCNC: 9 MG/DL
CHLORIDE SERPL-SCNC: 98 MMOL/L
CO2 SERPL-SCNC: 28 MMOL/L
CREAT SERPL-MCNC: 1.2 MG/DL
CRP SERPL-MCNC: 158.2 MG/L
DIFFERENTIAL METHOD: ABNORMAL
EOSINOPHIL # BLD AUTO: 0 K/UL
EOSINOPHIL NFR BLD: 0.5 %
ERYTHROCYTE [DISTWIDTH] IN BLOOD BY AUTOMATED COUNT: 15.2 %
ERYTHROCYTE [SEDIMENTATION RATE] IN BLOOD BY WESTERGREN METHOD: 140 MM/HR
EST. GFR  (AFRICAN AMERICAN): >60 ML/MIN/1.73 M^2
EST. GFR  (NON AFRICAN AMERICAN): >60 ML/MIN/1.73 M^2
GLUCOSE SERPL-MCNC: 150 MG/DL
HCT VFR BLD AUTO: 27.1 %
HGB BLD-MCNC: 9.2 G/DL
LYMPHOCYTES # BLD AUTO: 1 K/UL
LYMPHOCYTES NFR BLD: 11 %
MCH RBC QN AUTO: 33.5 PG
MCHC RBC AUTO-ENTMCNC: 34 G/DL
MCV RBC AUTO: 99 FL
MONOCYTES # BLD AUTO: 0.6 K/UL
MONOCYTES NFR BLD: 6.1 %
NEUTROPHILS # BLD AUTO: 7.2 K/UL
NEUTROPHILS NFR BLD: 78.3 %
PLATELET # BLD AUTO: 112 K/UL
PMV BLD AUTO: 9.7 FL
POCT GLUCOSE: 152 MG/DL (ref 70–110)
POCT GLUCOSE: 164 MG/DL (ref 70–110)
POCT GLUCOSE: 182 MG/DL (ref 70–110)
POTASSIUM SERPL-SCNC: 3.6 MMOL/L
PROCALCITONIN SERPL IA-MCNC: 0.19 NG/ML
PROT SERPL-MCNC: 6.5 G/DL
RBC # BLD AUTO: 2.75 M/UL
SODIUM SERPL-SCNC: 136 MMOL/L
WBC # BLD AUTO: 9.2 K/UL

## 2018-05-27 PROCEDURE — 25000003 PHARM REV CODE 250: Performed by: EMERGENCY MEDICINE

## 2018-05-27 PROCEDURE — 25000003 PHARM REV CODE 250: Performed by: INTERNAL MEDICINE

## 2018-05-27 PROCEDURE — 12000002 HC ACUTE/MED SURGE SEMI-PRIVATE ROOM

## 2018-05-27 PROCEDURE — 25000003 PHARM REV CODE 250: Performed by: FAMILY MEDICINE

## 2018-05-27 PROCEDURE — 99900035 HC TECH TIME PER 15 MIN (STAT)

## 2018-05-27 PROCEDURE — 25000242 PHARM REV CODE 250 ALT 637 W/ HCPCS: Performed by: NURSE PRACTITIONER

## 2018-05-27 PROCEDURE — 36415 COLL VENOUS BLD VENIPUNCTURE: CPT

## 2018-05-27 PROCEDURE — 63600175 PHARM REV CODE 636 W HCPCS: Performed by: INTERNAL MEDICINE

## 2018-05-27 PROCEDURE — 80053 COMPREHEN METABOLIC PANEL: CPT

## 2018-05-27 PROCEDURE — A4216 STERILE WATER/SALINE, 10 ML: HCPCS | Performed by: INTERNAL MEDICINE

## 2018-05-27 PROCEDURE — 25000003 PHARM REV CODE 250: Performed by: NURSE PRACTITIONER

## 2018-05-27 PROCEDURE — 97116 GAIT TRAINING THERAPY: CPT

## 2018-05-27 PROCEDURE — 27000221 HC OXYGEN, UP TO 24 HOURS

## 2018-05-27 PROCEDURE — S4991 NICOTINE PATCH NONLEGEND: HCPCS | Performed by: NURSE PRACTITIONER

## 2018-05-27 PROCEDURE — 94640 AIRWAY INHALATION TREATMENT: CPT

## 2018-05-27 PROCEDURE — 86140 C-REACTIVE PROTEIN: CPT

## 2018-05-27 PROCEDURE — 94660 CPAP INITIATION&MGMT: CPT

## 2018-05-27 PROCEDURE — 97110 THERAPEUTIC EXERCISES: CPT

## 2018-05-27 PROCEDURE — 85651 RBC SED RATE NONAUTOMATED: CPT

## 2018-05-27 PROCEDURE — 94761 N-INVAS EAR/PLS OXIMETRY MLT: CPT

## 2018-05-27 PROCEDURE — 85025 COMPLETE CBC W/AUTO DIFF WBC: CPT

## 2018-05-27 PROCEDURE — 84145 PROCALCITONIN (PCT): CPT

## 2018-05-27 RX ADMIN — SODIUM CHLORIDE, PRESERVATIVE FREE 10 ML: 5 INJECTION INTRAVENOUS at 12:05

## 2018-05-27 RX ADMIN — POLYETHYLENE GLYCOL (3350) 17 G: 17 POWDER, FOR SOLUTION ORAL at 09:05

## 2018-05-27 RX ADMIN — NICOTINE 1 PATCH: 14 PATCH, EXTENDED RELEASE TRANSDERMAL at 10:05

## 2018-05-27 RX ADMIN — GABAPENTIN 300 MG: 300 CAPSULE ORAL at 09:05

## 2018-05-27 RX ADMIN — METOPROLOL SUCCINATE 25 MG: 25 TABLET, EXTENDED RELEASE ORAL at 09:05

## 2018-05-27 RX ADMIN — GABAPENTIN 300 MG: 300 CAPSULE ORAL at 03:05

## 2018-05-27 RX ADMIN — LACTOBACILLUS ACIDOPHILUS / LACTOBACILLUS BULGARICUS 1 EACH: 100 MILLION CFU STRENGTH GRANULES at 09:05

## 2018-05-27 RX ADMIN — SODIUM CHLORIDE, PRESERVATIVE FREE 10 ML: 5 INJECTION INTRAVENOUS at 11:05

## 2018-05-27 RX ADMIN — ENOXAPARIN SODIUM 40 MG: 100 INJECTION SUBCUTANEOUS at 05:05

## 2018-05-27 RX ADMIN — TAMSULOSIN HYDROCHLORIDE 0.4 MG: 0.4 CAPSULE ORAL at 09:05

## 2018-05-27 RX ADMIN — ACETAMINOPHEN 650 MG: 325 TABLET, FILM COATED ORAL at 03:05

## 2018-05-27 RX ADMIN — IPRATROPIUM BROMIDE AND ALBUTEROL SULFATE 3 ML: .5; 3 SOLUTION RESPIRATORY (INHALATION) at 07:05

## 2018-05-27 RX ADMIN — ATORVASTATIN CALCIUM 40 MG: 40 TABLET, FILM COATED ORAL at 09:05

## 2018-05-27 RX ADMIN — CEFAZOLIN SODIUM 2 G: 2 SOLUTION INTRAVENOUS at 02:05

## 2018-05-27 RX ADMIN — MONTELUKAST SODIUM 10 MG: 10 TABLET, FILM COATED ORAL at 09:05

## 2018-05-27 RX ADMIN — ACETAMINOPHEN 650 MG: 325 TABLET, FILM COATED ORAL at 05:05

## 2018-05-27 RX ADMIN — IPRATROPIUM BROMIDE AND ALBUTEROL SULFATE 3 ML: .5; 3 SOLUTION RESPIRATORY (INHALATION) at 04:05

## 2018-05-27 RX ADMIN — NYSTATIN 500000 UNITS: 100000 SUSPENSION ORAL at 09:05

## 2018-05-27 RX ADMIN — IPRATROPIUM BROMIDE AND ALBUTEROL SULFATE 3 ML: .5; 3 SOLUTION RESPIRATORY (INHALATION) at 03:05

## 2018-05-27 RX ADMIN — SODIUM CHLORIDE, PRESERVATIVE FREE 10 ML: 5 INJECTION INTRAVENOUS at 05:05

## 2018-05-27 RX ADMIN — IPRATROPIUM BROMIDE AND ALBUTEROL SULFATE 3 ML: .5; 3 SOLUTION RESPIRATORY (INHALATION) at 11:05

## 2018-05-27 RX ADMIN — PANTOPRAZOLE SODIUM 40 MG: 40 TABLET, DELAYED RELEASE ORAL at 09:05

## 2018-05-27 RX ADMIN — CHLORDIAZEPOXIDE HYDROCHLORIDE 5 MG: 5 CAPSULE ORAL at 09:05

## 2018-05-27 RX ADMIN — STANDARDIZED SENNA CONCENTRATE AND DOCUSATE SODIUM 1 TABLET: 8.6; 5 TABLET, FILM COATED ORAL at 09:05

## 2018-05-27 RX ADMIN — IPRATROPIUM BROMIDE AND ALBUTEROL SULFATE 3 ML: .5; 3 SOLUTION RESPIRATORY (INHALATION) at 08:05

## 2018-05-27 RX ADMIN — IPRATROPIUM BROMIDE AND ALBUTEROL SULFATE 3 ML: .5; 3 SOLUTION RESPIRATORY (INHALATION) at 12:05

## 2018-05-27 RX ADMIN — AMIODARONE HYDROCHLORIDE 200 MG: 200 TABLET ORAL at 09:05

## 2018-05-27 RX ADMIN — FUROSEMIDE 40 MG: 10 INJECTION, SOLUTION INTRAVENOUS at 05:05

## 2018-05-27 RX ADMIN — NYSTATIN 500000 UNITS: 100000 SUSPENSION ORAL at 05:05

## 2018-05-27 RX ADMIN — THIAMINE HCL TAB 100 MG 100 MG: 100 TAB at 09:05

## 2018-05-27 RX ADMIN — CEFAZOLIN SODIUM 2 G: 2 SOLUTION INTRAVENOUS at 05:05

## 2018-05-27 RX ADMIN — FLUCONAZOLE 200 MG: 100 TABLET ORAL at 12:05

## 2018-05-27 RX ADMIN — LIDOCAINE 1 PATCH: 50 PATCH TOPICAL at 05:05

## 2018-05-27 RX ADMIN — Medication 1 TABLET: at 09:05

## 2018-05-27 RX ADMIN — AMLODIPINE BESYLATE 10 MG: 5 TABLET ORAL at 09:05

## 2018-05-27 RX ADMIN — NYSTATIN 500000 UNITS: 100000 SUSPENSION ORAL at 01:05

## 2018-05-27 RX ADMIN — FUROSEMIDE 40 MG: 10 INJECTION, SOLUTION INTRAVENOUS at 09:05

## 2018-05-27 RX ADMIN — DAPTOMYCIN 1000 MG: 500 INJECTION, POWDER, LYOPHILIZED, FOR SOLUTION INTRAVENOUS at 12:05

## 2018-05-27 RX ADMIN — BENAZEPRIL HYDROCHLORIDE 40 MG: 10 TABLET, FILM COATED ORAL at 09:05

## 2018-05-27 RX ADMIN — CEFAZOLIN SODIUM 2 G: 2 SOLUTION INTRAVENOUS at 09:05

## 2018-05-27 NOTE — PROGRESS NOTES
Called answering service spoke with Zoraida regarding positive blood culture.  Requested call back.  Physician on call Dr. Lugo.   0900 Spoke with Dr. Lugo regarding positive blood cultures.

## 2018-05-27 NOTE — PLAN OF CARE
Problem: Patient Care Overview  Goal: Plan of Care Review  Outcome: Ongoing (interventions implemented as appropriate)  Pt AAO x4, PICC in place, IV abx administered as ordered.  O2 maintained, bipap applied at night.  PRN pain medication administered for c/o of pain.  Tele maintained and monitored. POCT ac/hs , no SSI coverage given at this point.  VS stable.  Frequent rounding completed.  Pt has remained free of injuries and falls throughout shift.  Environment free of clutter, side rails up x2, and call light within reach.  Pt has been informed of the plan of care.

## 2018-05-27 NOTE — PLAN OF CARE
Problem: Physical Therapy Goal  Goal: Physical Therapy Goal  Goals to be met by: 2018     Patient will increase functional independence with mobility by performin. Sit to stand transfer with Contact Guard Assistance  2. Bed to chair transfer with Contact Guard Assistance using Rolling Walker  3. Gait  x 250x2 feet with Contact Guard Assistance using Rolling Walker.   4. Lower extremity exercise program x20 reps per handout, with assistance as needed     Outcome: Ongoing (interventions implemented as appropriate)  Patient participated in gait training, therapeutic exercise and activities to improve functional mobility, improve ADL's and promote safe ambulation to decrease fall risk.

## 2018-05-27 NOTE — PROGRESS NOTES
Progress Note  Infectious Disease    Follow-up For:  MSSA sepsis    SUBJECTIVE:   ROS:   5/21: Still has fever. Blood cultures from 5/19 positive. CT chest and bone scan noted and reviewed with wife, patient. TTE suboptimal to exclude endocarditis. Complains of right subscapular pain. Complains of constipation. Awaiting Mgcitrate to have its effect.   5/22: no fever today. MRI could not be done due to girth. Pain that was right subscapular is now right subcostal. He coughs, but produces no sputum. No nausea or vomiting but appetite is poor. Had 2 BMs after Mgcitrate. EDUAR will be done when respiratory status is im proved. Tolerates bipap poorly but mask adjusted again today by RT. Voiding without difficulty. Per RN, wife is not in favor of the librium due to intermittent confusion, but he is receiving it.   5/23: spoke with patient's dermatologist. Last visit was 9/2017 for dishydrotic eczema. This is too far in the past to associate with this illness. Blood cultures are still positive. Ct abd with no foci for bacteremia. CXR still looking wet and appreciate Dr. Walker ordering diuretic. No new visual, respi, GI,  , MSK or skin symptoms. A little more sedated and confused today, which may be from disturbed sleep on CPAP plus librium.   5/24: blood cultures from yesterday negative so far. CXR is improved with decreased fluid. IV lasix ordered by Dr. Montes. Mentation is a little better today, but looks depressed and not eating well. Ambulates with PT. PICC line was inserted. No new MSK complaints. No cigarettes x 7d. Discussed potential for transition to LTAC prior to home but wife really wants to take him home.   5/25: was not aware of last night or tonight's fever until just now. WBC remain normal. CXR continues to look wet. Coughing up a little yellow sputum. Able to wear CPAP last night. Up at 330 am, had coffee, shower, shave, breakfast and then knocked out again from librium. Dose decreased fo rthis pm. No  diarrhea, chest pain,n ausea, dysuria, rash, IV site problem,   05/26/2018: FEBRILE at 101.4 pt appear slow to respond when questons or being asked. His wife assists.   The patient worked with PT today. Walked with walker.   Sitting on a chair now. On high flow nasal cannula(6L). He states that he slept on his BiPAP last night> His wife perceived his lethargy to be secondary to his librium.  05/27/2018. Cough with brown phlegm. Still sleepy from librium as per night nurse.  New Positive  Blood cultures  With [facility]    Antibiotics     Start     Stop Route Frequency Ordered    05/21/18 1400  cefazolin (ANCEF) 2 gram in dextrose 5% 50 mL IVPB (premix)      -- IV Every 8 hours (non-standard times) 05/21/18 1245          Pertinent Medications noted:    EXAM & DIAGNOSTICS REVIEWED:   Vitals:     Temp:  [98 °F (36.7 °C)-99.9 °F (37.7 °C)]   Temp: 98 °F (36.7 °C) (05/27/18 0427)  Pulse: 64 (05/27/18 0811)  Resp: 20 (05/27/18 0811)  BP: (!) 145/63 (05/27/18 0811)  SpO2: (!) 94 % (05/27/18 0811)    Intake/Output Summary (Last 24 hours) at 05/27/18 0852  Last data filed at 05/27/18 0700   Gross per 24 hour   Intake             1060 ml   Output                0 ml   Net             1060 ml   Temp:  [97.4 °F (36.3 °C)-103.2 °F (39.6 °C)]  range      General:  In NAD. Looks fairly comfortable,difficult to understand with bipap  on  Eyes:  Anicteric,   ENT:  Wearing BIPAB    Neck:  Trachea midline, supple, no adenopathy appreciated  Lung                 Decreased Bs with basilar  crackles, Bilaterally.no change. Wet cough  Heart:  RRR, no gallop/rub, ?soft systolic murmur.  tachy.   Abd:  soft, NT, ND, normal BS, no masses/organomegaly appreciated. protuberant  :  Voids but not able to handle to urinal.   Musc:  Joints without effusion, erythema, synovitis,   Skin:  Generally warm, dry, normal for color.No septic skin lesions Yeast infection to groin bilaterally  Wound:   Neuro: AAOx3, speech clear, moves all extrems equally.  Fairly appropriate at present  Extrem: No palpable edema, erythema, phlebitis, cellulitis, synovitis  VAD:  picc    Lines/Tubes/Drains:    General Labs reviewed:    Recent Labs  Lab 05/27/18  0507   WBC 9.20   RBC 2.75*   HGB 9.2*   HCT 27.1*   *   MCV 99*   MCH 33.5*   MCHC 34.0       Recent Labs  Lab 05/27/18  0507   CALCIUM 9.0   PROT 6.5      K 3.6   CO2 28   CL 98   BUN 24*   CREATININE 1.2   ALKPHOS 82   ALT 47*   AST 51*   BILITOT 0.5       Micro: MSSA from blood cultures 5/17, 5/19, 5/22, 05/25  Sputum culture: normal sharron  Urine culture negative    Microbiology Results (last 7 days)     Procedure Component Value Units Date/Time    Blood culture [[id number]] Collected:  05/25/18 1936    Order Status:  Completed Specimen:  Blood from Line, PICC Right  Neck Updated:  05/27/18 0815     Blood Culture, Routine Gram stain aer bottle: Gram positive cocci in clusters resembling Staph      Blood Culture, Routine Results called to and read back by: Janette Bray LPN 05/27/2018  08:15    Narrative:       Draw from picc    Culture, Respiratory with Gram Stain [[id number]] Collected:  05/26/18 0430    Order Status:  Canceled Specimen:  Respiratory from Sputum, Expectorated Updated:  05/26/18 1318    Blood culture [[id number]] Collected:  05/24/18 0549    Order Status:  Completed Specimen:  Blood Updated:  05/26/18 1212     Blood Culture, Routine No Growth to date     Blood Culture, Routine No Growth to date     Blood Culture, Routine No Growth to date    Blood culture [[id number]] Collected:  05/23/18 0635    Order Status:  Completed Specimen:  Blood from Antecubital, Left Updated:  05/26/18 1212     Blood Culture, Routine No Growth to date     Blood Culture, Routine No Growth to date     Blood Culture, Routine No Growth to date     Blood Culture, Routine No Growth to date    Urine culture [[id number]] Collected:  05/26/18 0155    Order Status:  Sent Specimen:  Urine from Urine, Clean Catch Updated:  05/26/18 1154     Blood culture [[id number]]  (Susceptibility) Collected:  05/22/18 0453    Order Status:  Completed Specimen:  Blood from Antecubital, Right  Arm Updated:  05/25/18 1042     Blood Culture, Routine Gram stain aer bottle: Gram positive cocci in clusters resembling Staph      Blood Culture, Routine Results called to and read back by:Jigna Rios RN 05/23/2018  11:31     Blood Culture, Routine --     STAPHYLOCOCCUS AUREUS  ID consult required at [facility].      Blood culture [[id number]] Collected:  05/19/18 1356    Order Status:  Completed Specimen:  Blood Updated:  05/22/18 1454     Blood Culture, Routine Gram stain aer bottle: Gram positive cocci in clusters resembling Staph      Blood Culture, Routine Results called to and read back by: Vivian Castillo RN  05/20/2018  22:34     Blood Culture, Routine --     STAPHYLOCOCCUS AUREUS  ID consult required at [facility] and [facility].  For susceptibility see order #[id number]      Narrative:       Blood cultures x 2    Blood culture [[id number]]  (Susceptibility) Collected:  05/19/18 1346    Order Status:  Completed Specimen:  Blood Updated:  05/22/18 1454     Blood Culture, Routine Gram stain aer bottle: Gram positive cocci in clusters resembling Staph      Blood Culture, Routine Results called to and read back by: Radha Mendoza RN  05/20/2018  19:40     Blood Culture, Routine --     STAPHYLOCOCCUS AUREUS  ID consult required at [facility].      Narrative:       X 2 sets    Culture, Respiratory with Gram Stain [[id number]] Collected:  05/20/18 1056    Order Status:  Completed Specimen:  Respiratory from Sputum Updated:  05/22/18 1255     Respiratory Culture Normal respiratory sharron     Gram Stain (Respiratory) <10 epithelial cells per low power field.     Gram Stain (Respiratory) Many WBC's     Gram Stain (Respiratory) Rare Gram positive cocci    Blood culture [[id number]] Collected:  05/17/18 1945    Order  Status:  Completed Specimen:  Blood Updated:  05/20/18 1310     Blood Culture, Routine Gram stain aer bottle: Gram positive cocci in clusters resembling Staph      Blood Culture, Routine Gram stain sharon bottle: Gram positive cocci in clusters resembling Staph      Blood Culture, Routine Results called to and read back by: Renny Hu RN 05/18/2018       Blood Culture, Routine 17:32     Blood Culture, Routine --     STAPHYLOCOCCUS AUREUS  ID consult required at [facility] and [facility] locations.  For susceptibility see order #[id number]      Blood culture [[id number]]  (Susceptibility) Collected:  05/17/18 1940    Order Status:  Completed Specimen:  Blood from Antecubital, Right  Arm Updated:  05/20/18 1032     Blood Culture, Routine Gram stain aer bottle: Gram positive cocci in clusters resembling Staph      Blood Culture, Routine Gram stain sharon bottle: Gram positive cocci in clusters resembling Staph      Blood Culture, Routine Results called to and read back by: Renny Hu RN 05/18/2018       Blood Culture, Routine 17:32     Blood Culture, Routine --     STAPHYLOCOCCUS AUREUS  ID consult required at [facility] and [facility] locations.            Imaging Reviewed: CT Chest  Impression   Patchy bilateral upper lobe interstitial infiltrates.  The upper lobe predominance suggest sarcoidosis or a rather atypical infectious process.  Cavitation, pleural thickening or other evidence of tuberculosis is not seen.  Small right pleural effusion and trace left pleural effusion is noted with moderate atelectasis at both lung bases.  Consolidation at the left lung base is not seen.  Mild cardiomegaly.  Coronary artery calcification.     Bone scan: 5/20  Impression   There is no scintigraphic evidence of metastatic disease or osseous destruction..   MRI thoracic spine could not be done due to girth.    Abdomen/pelvis Ct 5/23  Impression   Bilateral small pleural effusions and basilar atelectasis  slightly increased in size from the prior study.  Chronic degenerative disc disease from L2-L5.  Mild prominence of the left adrenal gland without a focal mass seen.  Atherosclerosis       CXR: Bilateral infiltrates left more so than right, decreased in the perihilar region but increased basilar particularly on the right compared to the prior exam    CXR 05/27/2018 Decrease of the bilateral infiltrates compared to the prior exam    WBC scan There is no abnormal tracer localization suggesting abscess.  Physiologic activity is seen      ASSESSMENT & PLAN      1.         Staph aureus bacteremia(MSSA) source unclear. Persistent  05/17/2018-05/25/2018              Still cough. ?aspiration. Brown phlegm              Yeast inf to bl groin, local care  2.         Pulmonary edema, improved, requiring bipap intermittently  3.         Alcohol abuse, with suspect withdrawal, on librium with intermittent confusion  4.         Diabetes  5.         TKA right, and SQ loop recorder in situ  6. Mid back pain(resolved). negative bone scan  7.         Smoker  8.         CAD, HTN, PAF    Recommendation:   Ancef 2 g IV q 8  Add Dapto  EDUAR when improved from a pulmonary standpoint, ?next week  Sputum culture was not a good sample. Nurse is sending another one  Repeat daily Blood cultures        Patient Problem List   Diagnosis    BPH (benign prostatic hypertrophy)    Nocturia    Testicular hypofunction    Community acquired pneumonia of left lower lobe of lung    Severe sepsis    Acute hypoxemic respiratory failure    Paroxysmal atrial fibrillation    CAD (coronary artery disease)    HTN (hypertension)    Type 2 diabetes mellitus with hyperglycemia    Alcohol use    Tobacco abuse    ZACHARY (acute kidney injury)    Obstructive sleep apnea    COPD with acute lower respiratory infection

## 2018-05-27 NOTE — PROGRESS NOTES
Progress Note  Pulmonary/Critical Care Medicine    Admit Date: 5/17/2018    SUBJECTIVE:     Follow-up For:  MSSA Sepsis    Review of Systems:  Constitutional: no fever or chills  Respiratory: positive for cough and dyspnea   Cardiovascular: no chest pain or palpitations  Gastrointestinal: no nausea or vomiting, no abdominal pain or change in bowel habits  Extremities: Legs are always swollen  : wife going to buy pull ups for incontinence, patient denies any incontinence    OBJECTIVE:   The patient is sitting on a chair on high flow nasal cannula.He states that he slept on his BiPAP last night.  The patient and family do not understand his RV failure and pulm HTN.  The last echo done is not very helpful.  Explained again the need for diuresis, smoking cessation, Bipap nightly, weight loss and careful attention to fluids and medications at home.  Vital Signs (Most Recent)  Temp: 98 °F (36.7 °C) (05/27/18 0427)  Pulse: 64 (05/27/18 0811)  Resp: 20 (05/27/18 0811)  BP: (!) 145/63 (05/27/18 0811)  SpO2: (!) 94 % (05/27/18 0811)    I & O (Last 24H):    Intake/Output Summary (Last 24 hours) at 05/27/18 0939  Last data filed at 05/27/18 0700   Gross per 24 hour   Intake             1060 ml   Output                0 ml   Net             1060 ml       Physical Exam:  General: well developed, well nourished,obese.  HENT: Head:normocephalic, atraumatic.    Nose: Nares normal. Septum midline.   Throat: lips, mucosa.  Neck: supple, symmetrical  Lungs:  crackles to bases, no wheezes or rhonchi  Cardiovascular: Heart: regular rate and rhythm, S1, S2 normal  Chest Wall: no tenderness.   Extremities: no cyanosis,the legs have 2+ edema,.   Pulses: 2+ and symmetric.   Abdomen: soft,mildly tender, obese, fluid wave present  Musculoskeletal:no clubbing, cyanosis, muscle masses appropriate  Neurologic: Normal strength and tone. No focal numbness or weakness.  Psych/Behavioral:  calm    Laboratory:  CBC:    Recent Labs  Lab  05/27/18  0507   WBC 9.20   RBC 2.75*   HGB 9.2*   HCT 27.1*   *   MCV 99*   MCH 33.5*   MCHC 34.0     CMP    Recent Labs  Lab 05/27/18  0507   CALCIUM 9.0   ALBUMIN 2.0*   PROT 6.5      K 3.6   CO2 28   CL 98   BUN 24*   CREATININE 1.2   ALKPHOS 82   ALT 47*   AST 51*   BILITOT 0.5     Blood no growth  Blood with MSSA from right AC  Sputum- normal sharron     Ancef    DiagnostiLabs: Reviewed  X-Ray: Reviewed  Decrease of the bilateral infiltrates compared to the prior exam  ASSESSMENT/PLAN:   Staph aureus Bacteremia, MSSA  Pulmonary edema, better  COPD  WANDA  Alcohol abuse  Lethargy better    Continue sufficent oxygen and pressure with BIPAP to keep sats >90  Continue diuresis

## 2018-05-27 NOTE — PROGRESS NOTES
"Upon entering room to administer pts tx his wife was manipulating the IV, telemetry & continuous pulse ox. She stated pt just returned from CT & "I'm just hooking everything back up".  I told pts wife that I would call for his nurse to tend to the pt & I would reconnect pt to the pulse ox. Dr. Reaves in to see pt during his tx. Following pts tx Dr. Reaves requesting to wean O2. Pt has been on HFNC @ 6L. Pts wife stated "I think you should just put him on 5 & I'll keep an eye on him". Pt & wife educated on weaning O2 & pulse ox parameters. I explained to pts wife that the RT dept would be weaning his O2 & monitoring his O2 sats as well. Decreased O2 to 4L. Returned to pts room @ 1205. Pts wife had turned the pulse ox alarms off. O2 sats 96% on 4L.   "

## 2018-05-27 NOTE — PROGRESS NOTES
Progress Note  Infectious Disease    Follow-up For:  MSSA sepsis    SUBJECTIVE:   ROS:   5/21: Still has fever. Blood cultures from 5/19 positive. CT chest and bone scan noted and reviewed with wife, patient. TTE suboptimal to exclude endocarditis. Complains of right subscapular pain. Complains of constipation. Awaiting Mgcitrate to have its effect.   5/22: no fever today. MRI could not be done due to girth. Pain that was right subscapular is now right subcostal. He coughs, but produces no sputum. No nausea or vomiting but appetite is poor. Had 2 BMs after Mgcitrate. EDUAR will be done when respiratory status is im proved. Tolerates bipap poorly but mask adjusted again today by RT. Voiding without difficulty. Per RN, wife is not in favor of the librium due to intermittent confusion, but he is receiving it.   5/23: spoke with patient's dermatologist. Last visit was 9/2017 for dishydrotic eczema. This is too far in the past to associate with this illness. Blood cultures are still positive. Ct abd with no foci for bacteremia. CXR still looking wet and appreciate Dr. Walker ordering diuretic. No new visual, respi, GI,  , MSK or skin symptoms. A little more sedated and confused today, which may be from disturbed sleep on CPAP plus librium.   5/24: blood cultures from yesterday negative so far. CXR is improved with decreased fluid. IV lasix ordered by Dr. Montes. Mentation is a little better today, but looks depressed and not eating well. Ambulates with PT. PICC line was inserted. No new MSK complaints. No cigarettes x 7d. Discussed potential for transition to LTAC prior to home but wife really wants to take him home.   5/25: was not aware of last night or tonight's fever until just now. WBC remain normal. CXR continues to look wet. Coughing up a little yellow sputum. Able to wear CPAP last night. Up at 330 am, had coffee, shower, shave, breakfast and then knocked out again from librium. Dose decreased fo rthis pm. No  diarrhea, chest pain,n ausea, dysuria, rash, IV site problem,   05/26/2018: FEBRILE at 101.4 pt appear slow to respond when questons or being asked. His wife assists.   The patient worked with PT today.Walked with walker.   Sitting on a chair now. On high flow nasal cannula(6L). He states that he slept on his BiPAP last night> His wife perceived his lethargy to be secondary to his librium.    Antibiotics     Start     Stop Route Frequency Ordered    05/21/18 1400  cefazolin (ANCEF) 2 gram in dextrose 5% 50 mL IVPB (premix)      -- IV Every 8 hours (non-standard times) 05/21/18 1245          Pertinent Medications noted:    EXAM & DIAGNOSTICS REVIEWED:   Vitals:     Temp:  [98.1 °F (36.7 °C)-101.4 °F (38.6 °C)]   Temp: 99.9 °F (37.7 °C) (05/26/18 2011)  Pulse: 71 (05/26/18 2011)  Resp: 16 (05/26/18 2011)  BP: (!) 141/64 (05/26/18 1601)  SpO2: (!) 94 % (05/26/18 2011)    Intake/Output Summary (Last 24 hours) at 05/26/18 2015  Last data filed at 05/26/18 1800   Gross per 24 hour   Intake             1110 ml   Output              375 ml   Net              735 ml   Temp:  [97.4 °F (36.3 °C)-103.2 °F (39.6 °C)]  range      General:  In NAD. Looks fairly comfortable, answering questions reasonably well, but takes some time  Eyes:  Anicteric, PERRL, EOMI  ENT:  Mouth w/ pink MMM, no lesions/exudate,    Neck:  Trachea midline, supple, no adenopathy appreciated  Lungs:  Decreased Bs with basilar  crackles, Bilaterally.no change. Wet cough  Heart:  RRR, no gallop/rub, ?soft systolic murmur.  tachy.   Abd:  soft, NT, ND, normal BS, no masses/organomegaly appreciated. protuberant  :  Voids but not able to handle to urinal.   Musc:  Joints without effusion, erythema, synovitis,   Skin:  Generally warm, dry, normal for color.No septic skin lesions Yeast infection to groin bilaterally  Wound:   Neuro: AAOx3, speech clear, moves all extrems equally. Fairly appropriate at present  Extrem: No palpable edema, erythema, phlebitis,  cellulitis, synovitis  VAD:  picc    Lines/Tubes/Drains:    General Labs reviewed:    Recent Labs  Lab 05/26/18  0550   WBC 8.60   RBC 2.64*   HGB 8.9*   HCT 26.2*   *   MCV 99*   MCH 33.5*   MCHC 33.8       Recent Labs  Lab 05/26/18  0550   CALCIUM 9.2   PROT 6.4      K 3.6   CO2 29   CL 97   BUN 24*   CREATININE 1.2   ALKPHOS 67   ALT 43   AST 43*   BILITOT 0.7       Micro: MSSA from blood cultures 5/17, 5/19, 5/22 with 5/23 negative so far  Sputum culture: normal sharron  Urine culture negative    Microbiology Results (last 7 days)     Procedure Component Value Units Date/Time    Blood culture [[id number]] Collected:  05/25/18 1936    Order Status:  Completed Specimen:  Blood from Line, PICC Right  Neck Updated:  05/26/18 1915     Blood Culture, Routine No Growth to date    Narrative:       Draw from picc    Culture, Respiratory with Gram Stain [[id number]] Collected:  05/26/18 0430    Order Status:  Canceled Specimen:  Respiratory from Sputum, Expectorated Updated:  05/26/18 1318    Blood culture [[id number]] Collected:  05/24/18 0549    Order Status:  Completed Specimen:  Blood Updated:  05/26/18 1212     Blood Culture, Routine No Growth to date     Blood Culture, Routine No Growth to date     Blood Culture, Routine No Growth to date    Blood culture [[id number]] Collected:  05/23/18 0635    Order Status:  Completed Specimen:  Blood from Antecubital, Left Updated:  05/26/18 1212     Blood Culture, Routine No Growth to date     Blood Culture, Routine No Growth to date     Blood Culture, Routine No Growth to date     Blood Culture, Routine No Growth to date    Urine culture [[id number]] Collected:  05/26/18 0155    Order Status:  Sent Specimen:  Urine from Urine, Clean Catch Updated:  05/26/18 1156    Blood culture [[id number]]  (Susceptibility) Collected:  05/22/18 0453    Order Status:  Completed Specimen:  Blood from Antecubital, Right  Arm Updated:  05/25/18 1042     Blood Culture, Routine Gram stain aer  bottle: Gram positive cocci in clusters resembling Staph      Blood Culture, Routine Results called to and read back by:Jigna Rios RN 05/23/2018  11:31     Blood Culture, Routine --     STAPHYLOCOCCUS AUREUS  ID consult required at [facility].      Blood culture [[id number]] Collected:  05/19/18 1356    Order Status:  Completed Specimen:  Blood Updated:  05/22/18 1454     Blood Culture, Routine Gram stain aer bottle: Gram positive cocci in clusters resembling Staph      Blood Culture, Routine Results called to and read back by: Vivian Castillo RN  05/20/2018  22:34     Blood Culture, Routine --     STAPHYLOCOCCUS AUREUS  ID consult required at [facility].  For susceptibility see order #[id number]      Narrative:       Blood cultures x 2    Blood culture [[id number]]  (Susceptibility) Collected:  05/19/18 1346    Order Status:  Completed Specimen:  Blood Updated:  05/22/18 1454     Blood Culture, Routine Gram stain aer bottle: Gram positive cocci in clusters resembling Staph      Blood Culture, Routine Results called to and read back by: Radha Mendoza RN  05/20/2018  19:40     Blood Culture, Routine --     STAPHYLOCOCCUS AUREUS  ID consult required at [facility].      Narrative:       X 2 sets    Culture, Respiratory with Gram Stain [[id number]] Collected:  05/20/18 1056    Order Status:  Completed Specimen:  Respiratory from Sputum Updated:  05/22/18 1255     Respiratory Culture Normal respiratory sharron     Gram Stain (Respiratory) <10 epithelial cells per low power field.     Gram Stain (Respiratory) Many WBC's     Gram Stain (Respiratory) Rare Gram positive cocci    Blood culture [[id number]] Collected:  05/17/18 1945    Order Status:  Completed Specimen:  Blood Updated:  05/20/18 1310     Blood Culture, Routine Gram stain aer bottle: Gram positive cocci in clusters resembling Staph      Blood Culture, Routine Gram stain sharon bottle:  Gram positive cocci in clusters resembling Staph      Blood Culture, Routine Results called to and read back by: Renny Hu RN 05/18/2018       Blood Culture, Routine 17:32     Blood Culture, Routine --     STAPHYLOCOCCUS AUREUS  ID consult required at [facility] and [facility] locations.  For susceptibility see order #[id number]      Blood culture [[id number]]  (Susceptibility) Collected:  05/17/18 1940    Order Status:  Completed Specimen:  Blood from Antecubital, Right  Arm Updated:  05/20/18 1032     Blood Culture, Routine Gram stain aer bottle: Gram positive cocci in clusters resembling Staph      Blood Culture, Routine Gram stain sharon bottle: Gram positive cocci in clusters resembling Staph      Blood Culture, Routine Results called to and read back by: Renny Hu RN 05/18/2018       Blood Culture, Routine 17:32     Blood Culture, Routine --     STAPHYLOCOCCUS AUREUS  ID consult required at [facility] and [facility] locations.      Urine culture [[id number]] Collected:  05/18/18 1130    Order Status:  Completed Specimen:  Urine from Urine, Clean Catch Updated:  05/19/18 2147     Urine Culture, Routine No growth          Imaging Reviewed: CT Chest  Impression   Patchy bilateral upper lobe interstitial infiltrates.  The upper lobe predominance suggest sarcoidosis or a rather atypical infectious process.  Cavitation, pleural thickening or other evidence of tuberculosis is not seen.  Small right pleural effusion and trace left pleural effusion is noted with moderate atelectasis at both lung bases.  Consolidation at the left lung base is not seen.  Mild cardiomegaly.  Coronary artery calcification.     Bone scan: 5/20  Impression   There is no scintigraphic evidence of metastatic disease or osseous destruction..   MRI thoracic spine could not be done due to girth.    Abdomen/pelvis Ct 5/23  Impression   Bilateral small pleural effusions and basilar atelectasis slightly increased in size from  the prior study.  Chronic degenerative disc disease from L2-L5.  Mild prominence of the left adrenal gland without a focal mass seen.  Atherosclerosis       CXR: Bilateral infiltrates left more so than right, decreased in the perihilar region but increased basilar particularly on the right compared to the prior exam  ASSESSMENT & PLAN      1.         Staph aureus bacteremia(MSSA) source unclear.               CT chest looked more like fluid than pneumonia, persistent positive cultures.                Still fever. Still cough. ?aspiration. CRP Elevated at 221.                Yeast inf to bl groin  2.         Pulmonary edema, improved, requiring bipap intermittently  3.         Alcohol abuse, with suspect withdrawal, on librium with intermittent confusion  4.         Diabetes  5.         TKA right, and SQ loop recorder in situ  6. Mid back pain(resolved). negative bone scan,  7.        Smoker  8.         CAD, HTN, PAF      Recommendation:   Follow Blood, urine cx  Sputum culture could not be collected as he is not able to bring it up  WBC scan soon  Ancef 2 g IV q 8  EDUAR when improved from a pulmonary standpoint, ?next week  I encouraged IS  If continues t be febrile will increase abx coverage  Diflucan x 1. Cont nystatin to groin      Patient Problem List   Diagnosis    BPH (benign prostatic hypertrophy)    Nocturia    Testicular hypofunction    Community acquired pneumonia of left lower lobe of lung    Severe sepsis    Acute hypoxemic respiratory failure    Paroxysmal atrial fibrillation    CAD (coronary artery disease)    HTN (hypertension)    Type 2 diabetes mellitus with hyperglycemia    Alcohol use    Tobacco abuse    ZACHARY (acute kidney injury)    Obstructive sleep apnea    COPD with acute lower respiratory infection

## 2018-05-27 NOTE — PLAN OF CARE
Problem: Patient Care Overview  Goal: Plan of Care Review  Outcome: Ongoing (interventions implemented as appropriate)  Pt up in chair,  family at bedside, pt appear slow to respond when questions or being asked, abdomen is large, firm and distended, +bs to all 4 quads, no seizure activity this shift, pt is compliant with care, unable to collect sputum specimen this shift, cough has been non-productive, remains afebrile while receiving antibiotic therapy, ambulates with walker x 1 person assist, telemonitoring in progress, will continue to monitor, observe and note any changes, safety maintain

## 2018-05-27 NOTE — PROGRESS NOTES
05/27/18 0737   Patient Assessment/Suction   Level of Consciousness (AVPU) alert   All Lung Fields Breath Sounds diminished   Cough Type none   PRE-TX-O2-ETCO2   O2 Device (Oxygen Therapy) High Flow nasal Cannula   $ Is the patient on Low Flow Oxygen? Yes   Flow (L/min) 6   SpO2 98 %   Pulse Oximetry Type Continuous   Pulse 62   Resp 18   Aerosol Therapy   $ Aerosol Therapy Charges Aerosol Treatment   Respiratory Treatment Status given   SVN/Inhaler Treatment Route mask   Position During Treatment Sitting in chair   Patient Tolerance good   Post-Treatment   Post-treatment Heart Rate (beats/min) 65   Post-treatment Resp Rate (breaths/min) 18   All Fields Breath Sounds unchanged   Preset CPAP/BiPAP Settings   Mode Of Delivery Standby

## 2018-05-27 NOTE — PT/OT/SLP PROGRESS
Physical Therapy Treatment    Patient Name:  Jun Stuart   MRN:  [id number]    Recommendations:     Discharge Recommendations:      Discharge Equipment Recommendations:     Barriers to discharge: None    Assessment:     Jun Stuart is a 72 y.o. male admitted with a medical diagnosis of Acute hypoxemic respiratory failure.  He presents with the following impairments/functional limitations:  weakness, impaired endurance, gait instability, impaired functional mobilty, impaired balance, decreased lower extremity function, decreased safety awareness, impaired cardiopulmonary response to activity, edema, impaired coordination. Patient fatigued but wanted to exercise and walk as much as he could to get stronger. Patient tends to use walker too far out in front of him and has LOB with turns. He ambulates too fast for safety and did not grasp safety issues during gait training. With sit from standing back in chair, patient has poor proprioception and requires good  on safety belt with two persons to assist with sitting in the chair and not falling back on floor.  Patient is somewhat impulsive.    Rehab Prognosis:  fair; patient would benefit from acute skilled PT services to address these deficits and reach maximum level of function.      Recent Surgery: * No surgery found *      Plan:     During this hospitalization, patient to be seen 6 x/week to address the above listed problems via gait training, therapeutic activities, therapeutic exercises  · Plan of Care Expires:  06/08/18   Plan of Care Reviewed with: patient, spouse    Subjective     Communicated with sharif Savage prior to session.  Patient found sitting up in chair upon PT entry to room, agreeable to treatment.      Chief Complaint: fatigued  Patient comments/goals: get stronger and feel better so he can go home.  Pain/Comfort:  · Pain Rating 1: 0/10    Patients cultural, spiritual, Advent conflicts given the current situation: none    Objective:      Patient found with: peripheral IV, pulse ox (continuous), oxygen, telemetry     General Precautions: Standard, fall, respiratory   Orthopedic Precautions:N/A   Braces:       Functional Mobility:  · Transfers:     · Sit to Stand:  maximal assistance and of 2 persons with rolling walker  · Gait: 250' with CGA and assistance of 2. Patient required VC for safety and proper use of RW for ambulation. Patient is unsafe ambulator and requires 2 persons for ambulation.      AM-PAC 6 CLICK MOBILITY          Therapeutic Activities and Exercises:  BLE therex x 10 including partial sit to stand from arm chair x 10 to increase transfers and improve LE strength.    Patient left up in chair with all lines intact, call button in reach, nursing notified and spouse and brother present..    GOALS:    Physical Therapy Goals        Problem: Physical Therapy Goal    Goal Priority Disciplines Outcome Goal Variances Interventions   Physical Therapy Goal     PT/OT, PT Ongoing (interventions implemented as appropriate)     Description:  Goals to be met by: 2018     Patient will increase functional independence with mobility by performin. Sit to stand transfer with Contact Guard Assistance  2. Bed to chair transfer with Contact Guard Assistance using Rolling Walker  3. Gait  x 250x2 feet with Contact Guard Assistance using Rolling Walker.   4. Lower extremity exercise program x20 reps per handout, with assistance as needed                      Time Tracking:     PT Received On: 18  PT Start Time: 1230     PT Stop Time: 1300  PT Total Time (min): 30 min     Billable Minutes: Gait Training 15 and Therapeutic Exercise 15    Treatment Type: Treatment  PT/PTA: PTA     PTA Visit Number: 1     Marta Low, PTA  2018

## 2018-05-27 NOTE — PLAN OF CARE
05/26/18 1945   Patient Assessment/Suction   Level of Consciousness (AVPU) alert   Respiratory Effort Normal;Unlabored   Expansion/Accessory Muscles/Retractions expansion symmetric;no retractions;no use of accessory muscles   All Lung Fields Breath Sounds diminished   Cough Type none   PRE-TX-O2-ETCO2   O2 Device (Oxygen Therapy) High Flow nasal Cannula   Flow (L/min) 6   SpO2 98 %   Pulse Oximetry Type Continuous   Pulse 69   Resp 18   Aerosol Therapy   $ Aerosol Therapy Charges Aerosol Treatment   Respiratory Treatment Status given   SVN/Inhaler Treatment Route mask;with oxygen   Position During Treatment Sitting in chair   Patient Tolerance good   Post-Treatment   Post-treatment Heart Rate (beats/min) 67   Post-treatment Resp Rate (breaths/min) 18   All Fields Breath Sounds unchanged   Preset CPAP/BiPAP Settings   Mode Of Delivery Standby

## 2018-05-27 NOTE — PROGRESS NOTES
[facility] medicine    CC: SOB    HPI 72 y.o. male seen and examined with no acute events. More alert today. Vitals stable. Blood cultures persistently positive for staph.       Past Medical History:   Diagnosis Date    Atrial fibrillation     Colon polyp     Coronary artery disease     Diabetes mellitus     GERD (gastroesophageal reflux disease)     Hyperlipidemia     Hypertension          Review of Systems  General ROS: negative for chills, fever or weight loss  Cardiovascular ROS: no chest pain; positive for shortness of breath although improved.   Gastrointestinal ROS: no abdominal pain, change in bowel habits, or black/ bloody stools    Physical Examination  BP (!) 145/63 (BP Location: Left arm, Patient Position: Sitting)   Pulse 64   Temp 98 °F (36.7 °C)   Resp 20   Ht 6' (1.829 m)   Wt 131.9 kg (290 lb 12.6 oz)   SpO2 (!) 94%   BMI 39.44 kg/m²   General appearance: alert, cooperative, no distress; sitting up in chair  HENT: Normocephalic, atraumatic, neck symmetrical, no nasal discharge   Lungs: crackles bilaterally, no dullness to percussion bilaterally  Heart: regular rate and rhythm without rub; no displacement of the PMI   Abdomen: soft, non-tender; bowel sounds normoactive; no organomegaly  Extremities: extremities symmetric; no clubbing, cyanosis, or edema  Neurologic: Alert and oriented X 3, normal strength, normal coordination and gait    Labs:  Lab Results   Component Value Date    WBC 9.20 05/27/2018    HGB 9.2 (L) 05/27/2018    HCT 27.1 (L) 05/27/2018    MCV 99 (H) 05/27/2018     (L) 05/27/2018         Imaging: No new imaging    Assessment and Plan:    Acute hypoxemic respiratory failure  Community acquired pneumonia of left lower lobe of lung  Staph Aureus Pneumonia, left lower lobe  Staph Aureus Bacteremia  Supplemental O2 via nasal canula; titrate O2 saturation to >92%. Use BiPAP as needed.  Follow pulmonary recommendations.   Continue beta 2 agonist bronchodilator treatments.    Continue IV antibiotics - Ancef + dapto given another positive culture-ordered by ID  Follow up most recent cultures  Continue routine medications as before.   Continue Lasix 40 mg IV BID  WBC scan today and will need EDUAR                                                    Type 2 diabetes mellitus with hyperglycemia       Chronic problem.  Uncontrolled hyperglycemia upon admission labs.    Check blood glucose level q AC/HS.  Use Novolog Insulin Sliding Scale as needed.   Continue American Diabetic Association 1800 Kcal diet.            UTI            Follow urine C/S. Continue IV antibiotics.                   ZACHARY (acute kidney injury)       Resolved           Tobacco abuse       Dangers of cigarette smoking were reviewed with patient in detail for 3 minutes and patient was encouraged to quit. Nicotine replacement options were discussed - option to use Nicoderm.           Alcohol use       Librium reduced to BID dosing.  Should be out of window for withdrawals           HTN (hypertension)       Chronic problem.  Labile.  Continue home antihypertensive regimen and monitor b/p closely.  Adjust medications as necessary.           CAD (coronary artery disease)       Historical diagnosis with stent placement.  Did not present with s/s of ACS.  Will continue home BB, ASA, and Statin therapy.  Cardiac monitoring  Monitor closely for s/s of ACS.           Paroxysmal atrial fibrillation       S/P cardioversion.  Since patient has been in NSR throughout hospitalization, will reduce Lovenox 40 mg sq q day. Discussed with Dr. SALVATORE Montes.  Continue home BB, Amiodarone.  Cardiac monitoring.

## 2018-05-28 LAB
ALBUMIN SERPL BCP-MCNC: 2.1 G/DL
ALP SERPL-CCNC: 80 U/L
ALT SERPL W/O P-5'-P-CCNC: 37 U/L
ANION GAP SERPL CALC-SCNC: 10 MMOL/L
AST SERPL-CCNC: 38 U/L
BACTERIA BLD CULT: NORMAL
BASOPHILS # BLD AUTO: 0 K/UL
BASOPHILS NFR BLD: 0.1 %
BILIRUB SERPL-MCNC: 0.4 MG/DL
BUN SERPL-MCNC: 20 MG/DL
CALCIUM SERPL-MCNC: 9 MG/DL
CHLORIDE SERPL-SCNC: 98 MMOL/L
CO2 SERPL-SCNC: 28 MMOL/L
CREAT SERPL-MCNC: 1.1 MG/DL
CRP SERPL-MCNC: 121.7 MG/L
DIFFERENTIAL METHOD: ABNORMAL
EOSINOPHIL # BLD AUTO: 0 K/UL
EOSINOPHIL NFR BLD: 0.3 %
ERYTHROCYTE [DISTWIDTH] IN BLOOD BY AUTOMATED COUNT: 15.4 %
ERYTHROCYTE [SEDIMENTATION RATE] IN BLOOD BY WESTERGREN METHOD: 140 MM/HR
EST. GFR  (AFRICAN AMERICAN): >60 ML/MIN/1.73 M^2
EST. GFR  (NON AFRICAN AMERICAN): >60 ML/MIN/1.73 M^2
GLUCOSE SERPL-MCNC: 146 MG/DL
HCT VFR BLD AUTO: 27.6 %
HGB BLD-MCNC: 9.2 G/DL
LYMPHOCYTES # BLD AUTO: 1 K/UL
LYMPHOCYTES NFR BLD: 11.9 %
MCH RBC QN AUTO: 33.4 PG
MCHC RBC AUTO-ENTMCNC: 33.4 G/DL
MCV RBC AUTO: 100 FL
MONOCYTES # BLD AUTO: 0.6 K/UL
MONOCYTES NFR BLD: 6.7 %
NEUTROPHILS # BLD AUTO: 7.1 K/UL
NEUTROPHILS NFR BLD: 81 %
PLATELET # BLD AUTO: 112 K/UL
PMV BLD AUTO: 10.3 FL
POCT GLUCOSE: 136 MG/DL (ref 70–110)
POCT GLUCOSE: 164 MG/DL (ref 70–110)
POCT GLUCOSE: 170 MG/DL (ref 70–110)
POTASSIUM SERPL-SCNC: 3.6 MMOL/L
PROCALCITONIN SERPL IA-MCNC: 0.13 NG/ML
PROT SERPL-MCNC: 6.7 G/DL
RBC # BLD AUTO: 2.75 M/UL
SODIUM SERPL-SCNC: 136 MMOL/L
WBC # BLD AUTO: 8.8 K/UL

## 2018-05-28 PROCEDURE — 80053 COMPREHEN METABOLIC PANEL: CPT

## 2018-05-28 PROCEDURE — 86140 C-REACTIVE PROTEIN: CPT

## 2018-05-28 PROCEDURE — 27000221 HC OXYGEN, UP TO 24 HOURS

## 2018-05-28 PROCEDURE — 85651 RBC SED RATE NONAUTOMATED: CPT

## 2018-05-28 PROCEDURE — 25000003 PHARM REV CODE 250: Performed by: EMERGENCY MEDICINE

## 2018-05-28 PROCEDURE — 25000003 PHARM REV CODE 250: Performed by: FAMILY MEDICINE

## 2018-05-28 PROCEDURE — 63600175 PHARM REV CODE 636 W HCPCS: Performed by: INTERNAL MEDICINE

## 2018-05-28 PROCEDURE — 25000003 PHARM REV CODE 250: Performed by: INTERNAL MEDICINE

## 2018-05-28 PROCEDURE — 97116 GAIT TRAINING THERAPY: CPT

## 2018-05-28 PROCEDURE — 94660 CPAP INITIATION&MGMT: CPT

## 2018-05-28 PROCEDURE — 12000002 HC ACUTE/MED SURGE SEMI-PRIVATE ROOM

## 2018-05-28 PROCEDURE — S4991 NICOTINE PATCH NONLEGEND: HCPCS | Performed by: NURSE PRACTITIONER

## 2018-05-28 PROCEDURE — 99900035 HC TECH TIME PER 15 MIN (STAT)

## 2018-05-28 PROCEDURE — A4216 STERILE WATER/SALINE, 10 ML: HCPCS | Performed by: INTERNAL MEDICINE

## 2018-05-28 PROCEDURE — 94761 N-INVAS EAR/PLS OXIMETRY MLT: CPT

## 2018-05-28 PROCEDURE — 25000242 PHARM REV CODE 250 ALT 637 W/ HCPCS: Performed by: NURSE PRACTITIONER

## 2018-05-28 PROCEDURE — 85025 COMPLETE CBC W/AUTO DIFF WBC: CPT

## 2018-05-28 PROCEDURE — 99232 SBSQ HOSP IP/OBS MODERATE 35: CPT | Mod: ,,, | Performed by: INTERNAL MEDICINE

## 2018-05-28 PROCEDURE — 84145 PROCALCITONIN (PCT): CPT

## 2018-05-28 PROCEDURE — 25000003 PHARM REV CODE 250: Performed by: NURSE PRACTITIONER

## 2018-05-28 PROCEDURE — 87040 BLOOD CULTURE FOR BACTERIA: CPT

## 2018-05-28 PROCEDURE — 63600175 PHARM REV CODE 636 W HCPCS: Performed by: NURSE PRACTITIONER

## 2018-05-28 PROCEDURE — 94640 AIRWAY INHALATION TREATMENT: CPT

## 2018-05-28 RX ADMIN — GABAPENTIN 300 MG: 300 CAPSULE ORAL at 10:05

## 2018-05-28 RX ADMIN — IPRATROPIUM BROMIDE AND ALBUTEROL SULFATE 3 ML: .5; 3 SOLUTION RESPIRATORY (INHALATION) at 12:05

## 2018-05-28 RX ADMIN — NYSTATIN 500000 UNITS: 100000 SUSPENSION ORAL at 06:05

## 2018-05-28 RX ADMIN — GABAPENTIN 300 MG: 300 CAPSULE ORAL at 03:05

## 2018-05-28 RX ADMIN — THIAMINE HCL TAB 100 MG 100 MG: 100 TAB at 10:05

## 2018-05-28 RX ADMIN — LACTOBACILLUS ACIDOPHILUS / LACTOBACILLUS BULGARICUS 1 EACH: 100 MILLION CFU STRENGTH GRANULES at 09:05

## 2018-05-28 RX ADMIN — NICOTINE 1 PATCH: 14 PATCH, EXTENDED RELEASE TRANSDERMAL at 09:05

## 2018-05-28 RX ADMIN — CEFAZOLIN SODIUM 2 G: 2 SOLUTION INTRAVENOUS at 05:05

## 2018-05-28 RX ADMIN — MONTELUKAST SODIUM 10 MG: 10 TABLET, FILM COATED ORAL at 10:05

## 2018-05-28 RX ADMIN — LIDOCAINE 1 PATCH: 50 PATCH TOPICAL at 06:05

## 2018-05-28 RX ADMIN — SODIUM CHLORIDE, PRESERVATIVE FREE 10 ML: 5 INJECTION INTRAVENOUS at 05:05

## 2018-05-28 RX ADMIN — AMLODIPINE BESYLATE 10 MG: 5 TABLET ORAL at 10:05

## 2018-05-28 RX ADMIN — TAMSULOSIN HYDROCHLORIDE 0.4 MG: 0.4 CAPSULE ORAL at 10:05

## 2018-05-28 RX ADMIN — NYSTATIN 500000 UNITS: 100000 SUSPENSION ORAL at 09:05

## 2018-05-28 RX ADMIN — SODIUM CHLORIDE, PRESERVATIVE FREE 10 ML: 5 INJECTION INTRAVENOUS at 06:05

## 2018-05-28 RX ADMIN — MAGNESIUM HYDROXIDE 2400 MG: 400 SUSPENSION ORAL at 07:05

## 2018-05-28 RX ADMIN — DAPTOMYCIN 1000 MG: 500 INJECTION, POWDER, LYOPHILIZED, FOR SOLUTION INTRAVENOUS at 10:05

## 2018-05-28 RX ADMIN — CEFAZOLIN SODIUM 2 G: 2 SOLUTION INTRAVENOUS at 01:05

## 2018-05-28 RX ADMIN — CHLORDIAZEPOXIDE HYDROCHLORIDE 5 MG: 5 CAPSULE ORAL at 10:05

## 2018-05-28 RX ADMIN — SODIUM CHLORIDE, PRESERVATIVE FREE 10 ML: 5 INJECTION INTRAVENOUS at 01:05

## 2018-05-28 RX ADMIN — BENAZEPRIL HYDROCHLORIDE 40 MG: 10 TABLET, FILM COATED ORAL at 10:05

## 2018-05-28 RX ADMIN — ATORVASTATIN CALCIUM 40 MG: 40 TABLET, FILM COATED ORAL at 10:05

## 2018-05-28 RX ADMIN — ENOXAPARIN SODIUM 40 MG: 100 INJECTION SUBCUTANEOUS at 06:05

## 2018-05-28 RX ADMIN — AMIODARONE HYDROCHLORIDE 200 MG: 200 TABLET ORAL at 10:05

## 2018-05-28 RX ADMIN — METOPROLOL SUCCINATE 25 MG: 25 TABLET, EXTENDED RELEASE ORAL at 09:05

## 2018-05-28 RX ADMIN — IPRATROPIUM BROMIDE AND ALBUTEROL SULFATE 3 ML: .5; 3 SOLUTION RESPIRATORY (INHALATION) at 07:05

## 2018-05-28 RX ADMIN — IPRATROPIUM BROMIDE AND ALBUTEROL SULFATE 3 ML: .5; 3 SOLUTION RESPIRATORY (INHALATION) at 11:05

## 2018-05-28 RX ADMIN — FUROSEMIDE 40 MG: 10 INJECTION, SOLUTION INTRAVENOUS at 06:05

## 2018-05-28 RX ADMIN — NYSTATIN 500000 UNITS: 100000 SUSPENSION ORAL at 10:05

## 2018-05-28 RX ADMIN — NYSTATIN 500000 UNITS: 100000 SUSPENSION ORAL at 01:05

## 2018-05-28 RX ADMIN — CEFAZOLIN SODIUM 2 G: 2 SOLUTION INTRAVENOUS at 10:05

## 2018-05-28 RX ADMIN — ASPIRIN 325 MG ORAL TABLET 325 MG: 325 PILL ORAL at 09:05

## 2018-05-28 RX ADMIN — PANTOPRAZOLE SODIUM 40 MG: 40 TABLET, DELAYED RELEASE ORAL at 10:05

## 2018-05-28 RX ADMIN — HYDRALAZINE HYDROCHLORIDE 10 MG: 20 INJECTION INTRAMUSCULAR; INTRAVENOUS at 05:05

## 2018-05-28 RX ADMIN — FUROSEMIDE 40 MG: 10 INJECTION, SOLUTION INTRAVENOUS at 09:05

## 2018-05-28 RX ADMIN — LACTOBACILLUS ACIDOPHILUS / LACTOBACILLUS BULGARICUS 1 EACH: 100 MILLION CFU STRENGTH GRANULES at 10:05

## 2018-05-28 RX ADMIN — IPRATROPIUM BROMIDE AND ALBUTEROL SULFATE 3 ML: .5; 3 SOLUTION RESPIRATORY (INHALATION) at 03:05

## 2018-05-28 RX ADMIN — Medication 1 TABLET: at 10:05

## 2018-05-28 RX ADMIN — ACETAMINOPHEN 650 MG: 325 TABLET, FILM COATED ORAL at 10:05

## 2018-05-28 NOTE — PLAN OF CARE
05/27/18 2011   Patient Assessment/Suction   Level of Consciousness (AVPU) alert   Respiratory Effort Normal;Unlabored   Expansion/Accessory Muscles/Retractions expansion symmetric;no retractions;no use of accessory muscles   All Lung Fields Breath Sounds diminished   Cough Type nonproductive   PRE-TX-O2-ETCO2   O2 Device (Oxygen Therapy) nasal cannula   Flow (L/min) 3   SpO2 (!) 94 %   Pulse Oximetry Type Continuous   Pulse 67   Resp 20   Aerosol Therapy   $ Aerosol Therapy Charges Aerosol Treatment   Respiratory Treatment Status given   SVN/Inhaler Treatment Route mask;with oxygen   Position During Treatment HOB at 45 degrees   Patient Tolerance good   Post-Treatment   Post-treatment Heart Rate (beats/min) 64   Post-treatment Resp Rate (breaths/min) 17   All Fields Breath Sounds unchanged

## 2018-05-28 NOTE — PLAN OF CARE
Problem: Patient Care Overview  Goal: Plan of Care Review  Outcome: Ongoing (interventions implemented as appropriate)  Plan of care reviewed, patient verbalized understanding. AAOx4. Up ab alexis with 1 assist and walker. PT. PICC in place, dressing changed. IV abx infusing per order. Tele monitored, NSR. Continous O2 monitoring in place. High flow oxygen @ 4L. VSS. Accucheks AC & HS. Remain afebrile throughout shift. No complaints of pain throughout shift. Wife at bedside. Bed in lowest position, side rails x2, breaks locked, call light within reach. Patient remains free from fall and injury, no skin breakdown noted. Will continue to monitor.

## 2018-05-28 NOTE — PLAN OF CARE
Problem: Patient Care Overview  Goal: Individualization & Mutuality  Pt AAO x4, PICC in place, IV abx administered throughout shift.  No c/o of pain during shift.  O2 maintained.  Tele maintained and monitored.  PRN hydralizine administered, adequate results achieved.  PRN medication for constipation administered.  VS stable.  Hourly rounding completed.  Pt has remained free of injuries and falls throughout shift.  Environment free of clutter, side rails up x2, and call light within reach.  Pt has verbalized understanding of plan of care.

## 2018-05-28 NOTE — PROGRESS NOTES
Progress Note  PULMONARY  2  Admit Date: 5/17/2018 05/28/2018      SUBJECTIVE:     May 22, alert, uses hospital bipap but dislikes home units in past.  No home o2, usually good function.  Having pleuritic chest pain rather   May 23, alert, on 5 lpm sat 94%, confused last pm.  Uses niv.  May 24, still lingering left anterior chest pain (was more posterior),  Had lasix response wrt increase uo.  Used bipap last pm , still on 5lpm  May 28, gas exchange improved as only on 2-3 lpm.  Still leg edema, uses niv for sleep. Less delirium.        PFSH and Allergies reviewed.    OBJECTIVE:     Vitals (Most recent):  Vitals:    05/28/18 1119   BP: (!) 159/80   Pulse: 68   Resp: 18   Temp: 98 °F (36.7 °C)       Vitals (24 hour range):  Temp:  [97.6 °F (36.4 °C)-98.3 °F (36.8 °C)]   Pulse:  [64-74]   Resp:  [14-20]   BP: (148-187)/(65-81)   SpO2:  [94 %-97 %]       Intake/Output Summary (Last 24 hours) at 05/28/18 1312  Last data filed at 05/28/18 0600   Gross per 24 hour   Intake              480 ml   Output                0 ml   Net              480 ml          Physical Exam:  The patient's neuro status (alertness,orientation,cognitive function,motor skills,), pharyngeal exam (oral lesions, hygiene, abn dentition,), Neck (jvd,mass,thyroid,nodes in neck and above/below clavicle),RESPIRATORY(symmetry,effort,fremitus,percussion,auscultation),  Cor(rhythm,heart tones including gallops,perfusion,edema)ABD(distention,hepatic&splenomegaly,tenderness,masses), Skin(rash,cyanosis),Psyc(affect,judgement,).  Exam negative except for these pertinent findings:    Morbid obese, M4, edema legs +1 brawney- improved. Decreased bs, no distress on high flow ox. Symmetric,nl fremitus/percussion, oral yeast gone    Myocardial Findings     Left Ventricle ejection fraction at 65%. Normal left ventricle diastolic function. Normal left atrial pressure.      Right Ventricle Normal ejection fraction.      Left Atrium Cavity is severely dilated         Results for MEKA MEYER (MRN [id number]) as of 5/23/2018 16:39   Ref. Range 5/23/2018 09:47   POC PH Latest Ref Range: 7.35 - 7.45  7.419   POC PCO2 Latest Ref Range: 35 - 45 mmHg 39.2   POC PO2 Latest Ref Range: 80 - 100 mmHg 62 (L)       Radiographs reviewed: view by direct vision  cxr worsened from admit 5/18 to 5/20, ct with patchy gg infiltrates bilat with pleural eff /thickening. - cxr 5/22 still left >>right lung edema.  Results for orders placed during the hospital encounter of 05/17/18   X-Ray Chest 1 View    Narrative EXAMINATION:  XR CHEST 1 VIEW    CLINICAL HISTORY:  pneumonia; Lobar pneumonia, unspecified organism    TECHNIQUE:  Single frontal view of the chest was performed.    COMPARISON:  Chest of 19 May 2018.    FINDINGS:  There is mild cardiomegaly.  There is continued moderate pulmonary edema.  There is decreased density at the left lower lobe consistent with resolving pneumonia.      Impression Resolving left lower lobe pneumonia.  Continued moderate pulmonary edema and cardiomegaly consistent with congestive heart failure.      Electronically signed by: Renny Cuevas MD  Date:    05/20/2018  Time:    08:35   ]Impression       Patchy bilateral upper lobe interstitial infiltrates.  The upper lobe predominance suggest sarcoidosis or a rather atypical infectious process.  Cavitation, pleural thickening or other evidence of tuberculosis is not seen.  Small right pleural effusion and trace left pleural effusion is noted with moderate atelectasis at both lung bases.  Consolidation at the left lung base is not seen.  Mild cardiomegaly.  Coronary artery calcification.      Electronically signed by: Renny Cuevas MD  Date: 05/20/2018  Time: 10:35         Labs       Recent Labs  Lab 05/28/18  0419   WBC 8.80   HGB 9.2*   HCT 27.6*   *       Recent Labs  Lab 05/28/18  0419      K 3.6   CL 98   CO2 28   BUN 20   CREATININE 1.1   *   CALCIUM 9.0   AST 38   ALT 37   ALKPHOS 80    BILITOT 0.4   PROT 6.7   ALBUMIN 2.1*   .7*   SEDRATE 140*   PROCAL 0.13     No results for input(s): PH, PCO2, PO2, HCO3 in the last 24 hours.  Microbiology Results (last 7 days)     Procedure Component Value Units Date/Time    Blood culture [[id number]] Collected:  05/28/18 0419    Order Status:  Sent Specimen:  Blood from Line, PICC Right  Arm Updated:  05/28/18 1236    Blood culture [[id number]] Collected:  05/24/18 0549    Order Status:  Completed Specimen:  Blood Updated:  05/28/18 1212     Blood Culture, Routine No Growth to date     Blood Culture, Routine No Growth to date     Blood Culture, Routine No Growth to date     Blood Culture, Routine No Growth to date     Blood Culture, Routine No Growth to date    Blood culture [[id number]] Collected:  05/23/18 0635    Order Status:  Completed Specimen:  Blood from Antecubital, Left Updated:  05/28/18 1212     Blood Culture, Routine No growth after 5 days.    Blood culture [[id number]] Collected:  05/25/18 1936    Order Status:  Completed Specimen:  Blood from Line, PICC Right  Neck Updated:  05/28/18 0841     Blood Culture, Routine Gram stain aer bottle: Gram positive cocci in clusters resembling Staph      Blood Culture, Routine Results called to and read back by: Janette Bray LPN 05/27/2018  08:15     Blood Culture, Routine --     STAPHYLOCOCCUS AUREUS  Susceptibility pending  ID consult required at [facility] and [facility] locations.      Narrative:       Draw from picc    Urine culture [[id number]] Collected:  05/26/18 0155    Order Status:  Completed Specimen:  Urine from Urine, Clean Catch Updated:  05/27/18 1356     Urine Culture, Routine No growth    Culture, Respiratory with Gram Stain [[id number]] Collected:  05/26/18 0430    Order Status:  Canceled Specimen:  Respiratory from Sputum, Expectorated Updated:  05/26/18 1318    Blood culture [[id number]]  (Susceptibility) Collected:  05/22/18 0453    Order Status:  Completed Specimen:  Blood from  Antecubital, Right  Arm Updated:  05/25/18 1042     Blood Culture, Routine Gram stain aer bottle: Gram positive cocci in clusters resembling Staph      Blood Culture, Routine Results called to and read back by:Jigna Rios RN 05/23/2018  11:31     Blood Culture, Routine --     STAPHYLOCOCCUS AUREUS  ID consult required at [facility].      Blood culture [[id number]] Collected:  05/19/18 1356    Order Status:  Completed Specimen:  Blood Updated:  05/22/18 1454     Blood Culture, Routine Gram stain aer bottle: Gram positive cocci in clusters resembling Staph      Blood Culture, Routine Results called to and read back by: Vivian Castillo RN  05/20/2018  22:34     Blood Culture, Routine --     STAPHYLOCOCCUS AUREUS  ID consult required at [facility].  For susceptibility see order #[id number]      Narrative:       Blood cultures x 2    Blood culture [[id number]]  (Susceptibility) Collected:  05/19/18 1346    Order Status:  Completed Specimen:  Blood Updated:  05/22/18 1454     Blood Culture, Routine Gram stain aer bottle: Gram positive cocci in clusters resembling Staph      Blood Culture, Routine Results called to and read back by: Radha Mendoza RN  05/20/2018  19:40     Blood Culture, Routine --     STAPHYLOCOCCUS AUREUS  ID consult required at [facility].      Narrative:       X 2 sets    Culture, Respiratory with Gram Stain [[id number]] Collected:  05/20/18 1056    Order Status:  Completed Specimen:  Respiratory from Sputum Updated:  05/22/18 1255     Respiratory Culture Normal respiratory sharron     Gram Stain (Respiratory) <10 epithelial cells per low power field.     Gram Stain (Respiratory) Many WBC's     Gram Stain (Respiratory) Rare Gram positive cocci      Results for MITCH MEKA (MRN [id number]) as of 5/22/2018 17:18   Ref. Range 5/19/2018 00:04 5/19/2018 01:27   POC PH Latest Ref Range: 7.35 - 7.45  7.410 7.482 (H)   POC PCO2  Latest Ref Range: 35 - 45 mmHg 35.9 33.3 (L)   POC PO2 Latest Ref Range: 80 - 100 mmHg 41 (LL) 219 (H)       Impression:  Active Hospital Problems    Diagnosis  POA    *Acute hypoxemic respiratory failure [J96.01]  Yes    Obstructive sleep apnea [G47.33]  Yes    COPD with acute lower respiratory infection [J44.0]  Yes    Alcohol use [Z78.9]  Yes    Tobacco abuse [Z72.0]  Yes    ZACHARY (acute kidney injury) [N17.9]  Yes    Community acquired pneumonia of left lower lobe of lung [J18.1]  Yes    Severe sepsis [A41.9, R65.20]  Yes    Paroxysmal atrial fibrillation [I48.0]  Yes    CAD (coronary artery disease) [I25.10]  Yes    HTN (hypertension) [I10]  Yes    Type 2 diabetes mellitus with hyperglycemia [E11.65]  Yes      Resolved Hospital Problems    Diagnosis Date Resolved POA   No resolved problems to display.               Plan:     May 22, will f/u cxr.  Should clear resp failure with rx staph.  Copd  And osas and cor pulmonale may play role?  Echo suggest mitral dz/left heart problem?    May 23,  cxr sluggish, still left infiltrate, bnp 361-will give 20 lasix ivp x1 .  Adjust niv.      May 24, will dose lasix 20 again as still edema and labs ok.  Use niv for sleep and f/u cxr.  Seems sl better - delirium makes eval difficult?  mycostatin      May 28- looks like improving lung wise, cxr sl better and gas exchange better- should be good for EDUAR.                        .

## 2018-05-28 NOTE — PT/OT/SLP PROGRESS
"Physical Therapy Treatment    Patient Name:  Jun Stuart   MRN:  [id number]    Recommendations:     Discharge Recommendations:   (OP pulm rehab)       Assessment:     Jun Stuart is a 72 y.o. male admitted with a medical diagnosis of Acute hypoxemic respiratory failure.  He presents with the following impairments/functional limitations:  weakness, impaired endurance, impaired self care skills, impaired functional mobilty, gait instability, impaired balance, decreased lower extremity function, decreased safety awareness, impaired cardiopulmonary response to activity .    Rehab Prognosis:  fair; patient would benefit from acute skilled PT services to address these deficits and reach maximum level of function.      Recent Surgery: * No surgery found *      Plan:     During this hospitalization, patient to be seen 6 x/week to address the above listed problems via gait training, therapeutic activities, therapeutic exercises  · Plan of Care Expires:  06/08/18   Plan of Care Reviewed with: patient, family    Subjective     Communicated with nurse Carrington prior to session.  Patient found sleeping in chair upon PT entry to room, agreeable to treatment.        Patient comments/goals: pt reported wanting to "get the walk over with"  Pain/Comfort:  · Pain Rating 1: 0/10    Patients cultural, spiritual, Rastafari conflicts given the current situation: none    Objective:     Patient found with: peripheral IV, telemetry, pulse ox (continuous), oxygen     General Precautions: Standard, fall, respiratory   Orthopedic Precautions:N/A   Braces: N/A     Functional Mobility:  · Transfers:     · Sit to Stand:  moderate assistance (of 2 for safety and 2' initial increased lethargy)with rolling walker. Increased time to initiate 2' lethargy    · Gait: 250' with CGA using RW. IV and O2 in tow. Brief standing breaks taken for energy conservation. RT present during session and monitored Sats        Therapeutic Activities and Exercises:   "     Patient left up in chair with all lines intact, call button in reach, nurse Zeb notified and family and RT for breathing tx present..    GOALS:    Physical Therapy Goals        Problem: Physical Therapy Goal    Goal Priority Disciplines Outcome Goal Variances Interventions   Physical Therapy Goal     PT/OT, PT Ongoing (interventions implemented as appropriate)     Description:  Goals to be met by: 2018     Patient will increase functional independence with mobility by performin. Sit to stand transfer with Contact Guard Assistance  2. Bed to chair transfer with Contact Guard Assistance using Rolling Walker  3. Gait  x 250x2 feet with Contact Guard Assistance using Rolling Walker.   4. Lower extremity exercise program x20 reps per handout, with assistance as needed                      Time Tracking:     PT Received On: 18  PT Start Time: 1111     PT Stop Time: 1138  PT Total Time (min): 27 min     Billable Minutes: Gait Training 24    Treatment Type: Treatment  PT/PTA: PTA     PTA Visit Number: 2     Sana Negrete, PTA  2018

## 2018-05-28 NOTE — PLAN OF CARE
05/28/18 0728   Patient Assessment/Suction   Level of Consciousness (AVPU) alert   All Lung Fields Breath Sounds diminished   PRE-TX-O2-ETCO2   O2 Device (Oxygen Therapy) nasal cannula   $ Is the patient on Low Flow Oxygen? Yes   Flow (L/min) 3   Oxygen Concentration (%) 32   SpO2 96 %   Pulse Oximetry Type Continuous   $ Pulse Oximetry - Multiple Charge Pulse Oximetry - Multiple   Pulse 70   Resp 18   Aerosol Therapy   $ Aerosol Therapy Charges Aerosol Treatment   Respiratory Treatment Status given   SVN/Inhaler Treatment Route mask   Position During Treatment Sitting in chair   Patient Tolerance good   Post-Treatment   Post-treatment Heart Rate (beats/min) 72   Post-treatment Resp Rate (breaths/min) 18   All Fields Breath Sounds unchanged   Ready to Wean/Extubation Screen   FIO2<=50 (chart decimal) 0.32

## 2018-05-28 NOTE — PLAN OF CARE
Problem: Physical Therapy Goal  Goal: Physical Therapy Goal  Goals to be met by: 2018     Patient will increase functional independence with mobility by performin. Sit to stand transfer with Contact Guard Assistance  2. Bed to chair transfer with Contact Guard Assistance using Rolling Walker  3. Gait  x 250x2 feet with Contact Guard Assistance using Rolling Walker.   4. Lower extremity exercise program x20 reps per handout, with assistance as needed     Outcome: Ongoing (interventions implemented as appropriate)  PT for functional mobility/gait training

## 2018-05-28 NOTE — PROGRESS NOTES
"Progress Note  Hospital Medicine  Patient Name:Jun Stuart  MRN:  [id number]  Patient Class: IP- Inpatient  Admit Date: 5/17/2018  Length of Stay: 11 days  Expected Discharge Date:   Attending Physician: Erik Granados MD  Primary Care Provider:  Juanjo Park MD    SUBJECTIVE:     Principal Problem: Acute hypoxemic respiratory failure  Initial history of present illness: Jun Stuart is a 72 y.o. Male with PMHx significant for afib, CAD, GERD, HTN, DM and HLD.  He is admitted to the service of hospital medicine with severe sepsis and acute hypoxemic respiratory failure. He presented to the ED via EMS for further evaluation of confusion.  His wife stated that  this am the patient awoke, he began complaining of upper back pain. He was brought to [facility], where the wife states that patient had an "x-ray and EKG." She states that the patient was then given pain medication and discharged. The patient took the pain medication and then drank alcohol.  He became nausous and had 1 episode of emesis.  Wife stated he went to bed and when he tried to get up to go to the bathroom, he couldn't, so she called EMS.  She reported that he had been confused after the pain medication and alcohol.  She stated that he had chills while at [facility], but did not take his temperature.  She endorsed the patient has had and increase in cough that is nonproductive. EMS states that the patient had an CBG of 193 and was 91% on RA on arrival.    PMH/PSH/SH/FH/Meds: reviewed.    Symptoms/Review of Systems:  Afebrile, repeat blood cultures from 05-25-18 again positive for Staph Aureus sp. Patient is on 2-3 L/min NC supplemental oxygen. Using BiPAP nightly. Indium WBC tagged scan is negative. No chest pain or headache, fever or abdominal pain.     Diet:  Adequate intake.    Activity level: Up with assistance   Pain:  Patient reports no pain.       OBJECTIVE:   Vital Signs (Most Recent):      Temp: 97.6 °F (36.4 °C) " (05/28/18 0818)  Pulse: 72 (05/28/18 0818)  Resp: 14 (05/28/18 0818)  BP: (!) 172/76 (05/28/18 0818)  SpO2: 96 % (05/28/18 0818)       Vital Signs Range (Last 24H):  Temp:  [97.6 °F (36.4 °C)-98.3 °F (36.8 °C)]   Pulse:  [64-74]   Resp:  [14-20]   BP: (148-187)/(65-81)   SpO2:  [94 %-98 %]     Weight: 131.9 kg (290 lb 12.6 oz)  Body mass index is 39.44 kg/m².    Intake/Output Summary (Last 24 hours) at 05/28/18 0954  Last data filed at 05/28/18 0600   Gross per 24 hour   Intake              480 ml   Output                0 ml   Net              480 ml     Physical Examination:  Constitutional: He is oriented to person, place, and time. He appears well-developed and well-nourished. No distress.   Obese   HENT:   Head: Normocephalic and atraumatic.   Mouth/Throat: Oropharynx is clear and moist.   Eyes: Conjunctivae and EOM are normal. Pupils are equal, round, and reactive to light. No scleral icterus.   Neck: Normal range of motion. Neck supple. No JVD present. No tracheal deviation present. No thyromegaly present.   Cardiovascular: Normal rate, regular rhythm, normal heart sounds and intact distal pulses.  Exam reveals no gallop and no friction rub.    No murmur heard.  Pulses:       Dorsalis pedis pulses are 2+ on the right side, and 2+ on the left side.   Pulmonary/Chest: No accessory muscle usage. No respiratory distress. He has rhonchi. He has no rales.   Abdominal: Soft. Bowel sounds are normal. He exhibits no distension and no mass. There is no tenderness. There is no guarding.   Musculoskeletal: Normal range of motion. He exhibits no edema, tenderness or deformity.   Neurological: He is alert and oriented to person, place, and time. He has normal strength. He exhibits normal muscle tone. Coordination normal.   Skin: Skin is warm and intact. Capillary refill takes less than 2 seconds. No rash noted.   Psychiatric: He has a normal mood and affect. His speech is normal and behavior is normal. Judgment and thought  content normal.   Vitals reviewed.  CRANIAL NERVES   CN III, IV, VI   Pupils are equal, round, and reactive to light.  Extraocular motions are normal.     CBC:    Recent Labs  Lab 05/26/18  0550 05/27/18  0507 05/28/18 0419   WBC 8.60 9.20 8.80   RBC 2.64* 2.75* 2.75*   HGB 8.9* 9.2* 9.2*   HCT 26.2* 27.1* 27.6*   * 112* 112*   MCV 99* 99* 100*   MCH 33.5* 33.5* 33.4*   MCHC 33.8 34.0 33.4   BMP    Recent Labs  Lab 05/26/18  0550 05/27/18  0507 05/28/18 0419   * 150* 146*    136 136   K 3.6 3.6 3.6   CL 97 98 98   CO2 29 28 28   BUN 24* 24* 20   CREATININE 1.2 1.2 1.1   CALCIUM 9.2 9.0 9.0      Diagnostic Results:  Microbiology Results (last 7 days)     Procedure Component Value Units Date/Time    Blood culture [[id number]] Collected:  05/25/18 1936    Order Status:  Completed Specimen:  Blood from Line, PICC Right  Neck Updated:  05/28/18 0841     Blood Culture, Routine Gram stain aer bottle: Gram positive cocci in clusters resembling Staph      Blood Culture, Routine Results called to and read back by: Janette Bray LPN 05/27/2018  08:15     Blood Culture, Routine --     STAPHYLOCOCCUS AUREUS  Susceptibility pending  [facility].      Narrative:       Draw from picc    Blood culture [[id number]] Collected:  05/28/18 0419    Order Status:  Sent Specimen:  Blood Updated:  05/28/18 0419    Urine culture [[id number]] Collected:  05/26/18 0155    Order Status:  Completed Specimen:  Urine from Urine, Clean Catch Updated:  05/27/18 1356     Urine Culture, Routine No growth    Blood culture [[id number]] Collected:  05/24/18 0549    Order Status:  Completed Specimen:  Blood Updated:  05/27/18 1212     Blood Culture, Routine No Growth to date     Blood Culture, Routine No Growth to date     Blood Culture, Routine No Growth to date     Blood Culture, Routine No Growth to date    Blood culture [[id number]] Collected:  05/23/18 0635    Order Status:  Completed  Specimen:  Blood from Antecubital, Left Updated:  05/27/18 1212     Blood Culture, Routine No Growth to date     Blood Culture, Routine No Growth to date     Blood Culture, Routine No Growth to date     Blood Culture, Routine No Growth to date     Blood Culture, Routine No Growth to date    Culture, Respiratory with Gram Stain [[id number]] Collected:  05/26/18 0430    Order Status:  Canceled Specimen:  Respiratory from Sputum, Expectorated Updated:  05/26/18 1318    Blood culture [[id number]]  (Susceptibility) Collected:  05/22/18 0453    Order Status:  Completed Specimen:  Blood from Antecubital, Right  Arm Updated:  05/25/18 1042     Blood Culture, Routine Gram stain aer bottle: Gram positive cocci in clusters resembling Staph      Blood Culture, Routine Results called to and read back by:Jigna Rios RN 05/23/2018  11:31     Blood Culture, Routine --     STAPHYLOCOCCUS AUREUS  ID consult required at [facility].      Blood culture [[id number]] Collected:  05/19/18 1356    Order Status:  Completed Specimen:  Blood Updated:  05/22/18 1454     Blood Culture, Routine Gram stain aer bottle: Gram positive cocci in clusters resembling Staph      Blood Culture, Routine Results called to and read back by: Vivian Castillo RN  05/20/2018  22:34     Blood Culture, Routine --     STAPHYLOCOCCUS AUREUS  ID consult required at [facility].  For susceptibility see order #[id number]      Narrative:       Blood cultures x 2    Blood culture [[id number]]  (Susceptibility) Collected:  05/19/18 1346    Order Status:  Completed Specimen:  Blood Updated:  05/22/18 1454     Blood Culture, Routine Gram stain aer bottle: Gram positive cocci in clusters resembling Staph      Blood Culture, Routine Results called to and read back by: Radha Mendoza RN  05/20/2018  19:40     Blood Culture, Routine --     STAPHYLOCOCCUS AUREUS  ID consult required at [facility].       Narrative:       X 2 sets    Culture, Respiratory with Gram Stain [[id number]] Collected:  05/20/18 1056    Order Status:  Completed Specimen:  Respiratory from Sputum Updated:  05/22/18 1255     Respiratory Culture Normal respiratory sharron     Gram Stain (Respiratory) <10 epithelial cells per low power field.     Gram Stain (Respiratory) Many WBC's     Gram Stain (Respiratory) Rare Gram positive cocci         CXR: New left lower lobe pneumonia.  Cardiomegaly.  Prominence of the pulmonary vasculature may represent borderline heart function.    CXR: Congestive heart failure with mild to moderate pulmonary edema.  Additional dense infiltrate in the left lower lobe consistent with pneumonia.    CXR: Resolving left lower lobe pneumonia.  Continued moderate pulmonary edema and cardiomegaly consistent with congestive heart failure.    CT chest without contrast:  Patchy bilateral upper lobe interstitial infiltrates.  The upper lobe predominance suggest sarcoidosis or a rather atypical infectious process.  Cavitation, pleural thickening or other evidence of tuberculosis is not seen.  Small right pleural effusion and trace left pleural effusion is noted with moderate atelectasis at both lung bases.  Consolidation at the left lung base is not seen.  Mild cardiomegaly.  Coronary artery calcification.    Bone scan: There is no scintigraphic evidence of metastatic disease or osseous destruction.    ECHO:  · Left atrium is severely dilated.  · LVEF appears to be normal      Performing Sonographer     Vanita Guzman    Reason For Exam   Priority: Routine   Dx: Acute respiratory failure with hypoxemia [J96.01 (ICD-10-CM)]      Vitals     Height Weight BMI (Calculated) BSA (Calculated - sq m) BP   6' (1.829 m) 134.3 kg (296 lb) 40.2 2.61 sq meters 167/73       Study Details     A complete echo was performed using spectral Doppler. Overall the study quality was poor. The study had technical difficulties. The study was difficult due  to patient's body habitus.   Myocardial Findings     Left Ventricle ejection fraction at 65%. Normal left ventricle diastolic function. Normal left atrial pressure.      Right Ventricle Normal ejection fraction.      Left Atrium Cavity is severely dilated.        CT abdomen and pelvis with contrast:  Bilateral small pleural effusions and basilar atelectasis slightly increased in size from the prior study.  Chronic degenerative disc disease from L2-L5.  Mild prominence of the left adrenal gland without a focal mass seen.  Atherosclerosis.    CXR: Cardiomegaly with moderately increased interstitial markings bilaterally, most compatible with CHF.  This is slightly improved relative to 05/22/2018.  Suspected small pleural effusions also noted.  Continued radiographic follow-up recommended.    CXR: Bilateral infiltrates left more so than right, decreased in the perihilar region but increased basilar particularly on the right compared to the prior exam    CXR: Decrease of the bilateral infiltrates compared to the prior exam    WBC Indium scan: Negative.     Assessment/Plan:     Acute hypoxemic respiratory failure  Community acquired pneumonia of left lower lobe of lung  Staph Aureus Pneumonia, left lower lobe  Staph Aureus Bacteremia  Supplemental O2 via nasal canula; titrate O2 saturation to >92%. Use BiPAP as needed.  Follow pulmonary recommendations.   Continue beta 2 agonist bronchodilator treatments.   Continue IV antibiotics - Ancef + Cubicin.  Check sputum GS and Cx.   Continue routine medications as before.   Whole body scan results reviewed.  2 D ECHO results reviewed which is sub-optimal. Discussed with Dr. SALVATORE Montes for EDUAR, patient's respiratory status not staus for EDUAR at this time, may require anesthesiologist assistance.  Patient could not get MRI of back due to body habitus.   Follow CT abdomen and pelvis results.                                                   Type 2 diabetes mellitus with hyperglycemia        Chronic problem.  Uncontrolled hyperglycemia upon admission labs.    Check blood glucose level q AC/HS.  Use Novolog Insulin Sliding Scale as needed.   Continue American Diabetic Association 1800 Kcal diet.            UTI            Follow urine C/S. Continue IV antibiotics.                   ZACHARY (acute kidney injury)       Resolved           Tobacco abuse       Dangers of cigarette smoking were reviewed with patient in detail for 3 minutes and patient was encouraged to quit. Nicotine replacement options were discussed - option to use Nicoderm.           Alcohol use       Reportedly drinks etoh daily without history of DT's  Reduce Librium use.  Will utilize multivitamins  Educated on the deleterious effects of alcohol use  Seizure precautions with prn ativan   Fall precautions           HTN (hypertension)       Chronic problem.  Labile.  Continue home antihypertensive regimen and monitor b/p closely.  Adjust medications as necessary.           CAD (coronary artery disease)       Historical diagnosis with stent placement.  Did not present with s/s of ACS.  Will continue home BB, ASA, and Statin therapy.  Cardiac monitoring  Monitor closely for s/s of ACS.           Paroxysmal atrial fibrillation       S/P cardioversion.   Continue home BB, Amiodarone.  Cardiac monitoring.        VTE Risk Mitigation         Ordered     enoxaparin injection 40 mg  Daily      05/25/18 0614     IP VTE LOW RISK PATIENT  Once      05/17/18 5813        Erik Granados MD  Department of Hospital Medicine   [facility]

## 2018-05-29 ENCOUNTER — ANESTHESIA (OUTPATIENT)
Dept: SURGERY | Facility: HOSPITAL | Age: 73
DRG: 871 | End: 2018-05-29
Payer: MEDICARE

## 2018-05-29 ENCOUNTER — ANESTHESIA EVENT (OUTPATIENT)
Dept: SURGERY | Facility: HOSPITAL | Age: 73
DRG: 871 | End: 2018-05-29
Payer: MEDICARE

## 2018-05-29 LAB
ALBUMIN SERPL BCP-MCNC: 2.1 G/DL
ALP SERPL-CCNC: 73 U/L
ALT SERPL W/O P-5'-P-CCNC: 24 U/L
ANION GAP SERPL CALC-SCNC: 8 MMOL/L
AST SERPL-CCNC: 26 U/L
BACTERIA BLD CULT: NORMAL
BASOPHILS # BLD AUTO: 0 K/UL
BASOPHILS NFR BLD: 0.2 %
BILIRUB SERPL-MCNC: 0.3 MG/DL
BUN SERPL-MCNC: 21 MG/DL
CALCIUM SERPL-MCNC: 8.5 MG/DL
CHLORIDE SERPL-SCNC: 100 MMOL/L
CO2 SERPL-SCNC: 30 MMOL/L
CREAT SERPL-MCNC: 1.2 MG/DL
CRP SERPL-MCNC: 88.4 MG/L
DIFFERENTIAL METHOD: ABNORMAL
EOSINOPHIL # BLD AUTO: 0.1 K/UL
EOSINOPHIL NFR BLD: 1.1 %
ERYTHROCYTE [DISTWIDTH] IN BLOOD BY AUTOMATED COUNT: 15.5 %
EST. GFR  (AFRICAN AMERICAN): >60 ML/MIN/1.73 M^2
EST. GFR  (NON AFRICAN AMERICAN): >60 ML/MIN/1.73 M^2
GLUCOSE SERPL-MCNC: 137 MG/DL
HCT VFR BLD AUTO: 25.9 %
HGB BLD-MCNC: 8.7 G/DL
LYMPHOCYTES # BLD AUTO: 1.2 K/UL
LYMPHOCYTES NFR BLD: 15.4 %
MCH RBC QN AUTO: 33.5 PG
MCHC RBC AUTO-ENTMCNC: 33.6 G/DL
MCV RBC AUTO: 100 FL
MONOCYTES # BLD AUTO: 0.5 K/UL
MONOCYTES NFR BLD: 6.4 %
NEUTROPHILS # BLD AUTO: 6.1 K/UL
NEUTROPHILS NFR BLD: 76.9 %
PLATELET # BLD AUTO: 111 K/UL
PMV BLD AUTO: 10.3 FL
POCT GLUCOSE: 141 MG/DL (ref 70–110)
POCT GLUCOSE: 163 MG/DL (ref 70–110)
POCT GLUCOSE: 165 MG/DL (ref 70–110)
POTASSIUM SERPL-SCNC: 3.4 MMOL/L
PROCALCITONIN SERPL IA-MCNC: 0.1 NG/ML
PROT SERPL-MCNC: 6.4 G/DL
RBC # BLD AUTO: 2.6 M/UL
SODIUM SERPL-SCNC: 138 MMOL/L
WBC # BLD AUTO: 7.9 K/UL

## 2018-05-29 PROCEDURE — 63600175 PHARM REV CODE 636 W HCPCS: Performed by: INTERNAL MEDICINE

## 2018-05-29 PROCEDURE — 99232 SBSQ HOSP IP/OBS MODERATE 35: CPT | Mod: ,,, | Performed by: INTERNAL MEDICINE

## 2018-05-29 PROCEDURE — 99900035 HC TECH TIME PER 15 MIN (STAT)

## 2018-05-29 PROCEDURE — 37000009 HC ANESTHESIA EA ADD 15 MINS

## 2018-05-29 PROCEDURE — 25000003 PHARM REV CODE 250: Performed by: INTERNAL MEDICINE

## 2018-05-29 PROCEDURE — A4216 STERILE WATER/SALINE, 10 ML: HCPCS | Performed by: INTERNAL MEDICINE

## 2018-05-29 PROCEDURE — 94761 N-INVAS EAR/PLS OXIMETRY MLT: CPT

## 2018-05-29 PROCEDURE — 36415 COLL VENOUS BLD VENIPUNCTURE: CPT

## 2018-05-29 PROCEDURE — 63600175 PHARM REV CODE 636 W HCPCS: Performed by: NURSE ANESTHETIST, CERTIFIED REGISTERED

## 2018-05-29 PROCEDURE — 85025 COMPLETE CBC W/AUTO DIFF WBC: CPT

## 2018-05-29 PROCEDURE — 25000003 PHARM REV CODE 250: Performed by: EMERGENCY MEDICINE

## 2018-05-29 PROCEDURE — D9220A PRA ANESTHESIA: Mod: ANES,,, | Performed by: ANESTHESIOLOGY

## 2018-05-29 PROCEDURE — S4991 NICOTINE PATCH NONLEGEND: HCPCS | Performed by: NURSE PRACTITIONER

## 2018-05-29 PROCEDURE — 27000221 HC OXYGEN, UP TO 24 HOURS

## 2018-05-29 PROCEDURE — B24BZZ4 ULTRASONOGRAPHY OF HEART WITH AORTA, TRANSESOPHAGEAL: ICD-10-PCS | Performed by: SPECIALIST

## 2018-05-29 PROCEDURE — 25000242 PHARM REV CODE 250 ALT 637 W/ HCPCS: Performed by: NURSE PRACTITIONER

## 2018-05-29 PROCEDURE — 86140 C-REACTIVE PROTEIN: CPT

## 2018-05-29 PROCEDURE — 87040 BLOOD CULTURE FOR BACTERIA: CPT

## 2018-05-29 PROCEDURE — 25000003 PHARM REV CODE 250: Performed by: NURSE ANESTHETIST, CERTIFIED REGISTERED

## 2018-05-29 PROCEDURE — 25000003 PHARM REV CODE 250: Performed by: NURSE PRACTITIONER

## 2018-05-29 PROCEDURE — D9220A PRA ANESTHESIA: Mod: CRNA,,, | Performed by: NURSE ANESTHETIST, CERTIFIED REGISTERED

## 2018-05-29 PROCEDURE — 37000008 HC ANESTHESIA 1ST 15 MINUTES

## 2018-05-29 PROCEDURE — 80053 COMPREHEN METABOLIC PANEL: CPT

## 2018-05-29 PROCEDURE — 84145 PROCALCITONIN (PCT): CPT

## 2018-05-29 PROCEDURE — 12000002 HC ACUTE/MED SURGE SEMI-PRIVATE ROOM

## 2018-05-29 PROCEDURE — 94640 AIRWAY INHALATION TREATMENT: CPT

## 2018-05-29 RX ORDER — KETAMINE HYDROCHLORIDE 100 MG/ML
INJECTION, SOLUTION INTRAMUSCULAR; INTRAVENOUS
Status: DISCONTINUED | OUTPATIENT
Start: 2018-05-29 | End: 2018-05-29

## 2018-05-29 RX ORDER — KETAMINE HYDROCHLORIDE 100 MG/ML
INJECTION, SOLUTION INTRAMUSCULAR; INTRAVENOUS
Status: COMPLETED
Start: 2018-05-29 | End: 2018-05-29

## 2018-05-29 RX ORDER — LIDOCAINE HCL/PF 100 MG/5ML
SYRINGE (ML) INTRAVENOUS
Status: DISCONTINUED | OUTPATIENT
Start: 2018-05-29 | End: 2018-05-29

## 2018-05-29 RX ORDER — POTASSIUM CHLORIDE 7.45 MG/ML
10 INJECTION INTRAVENOUS
Status: DISPENSED | OUTPATIENT
Start: 2018-05-29 | End: 2018-05-29

## 2018-05-29 RX ORDER — POTASSIUM CHLORIDE 7.45 MG/ML
10 INJECTION INTRAVENOUS
Status: COMPLETED | OUTPATIENT
Start: 2018-05-29 | End: 2018-05-29

## 2018-05-29 RX ORDER — SODIUM CHLORIDE 9 MG/ML
INJECTION, SOLUTION INTRAVENOUS CONTINUOUS PRN
Status: DISCONTINUED | OUTPATIENT
Start: 2018-05-29 | End: 2018-05-29

## 2018-05-29 RX ORDER — PROPOFOL 10 MG/ML
INJECTION, EMULSION INTRAVENOUS
Status: COMPLETED
Start: 2018-05-29 | End: 2018-05-29

## 2018-05-29 RX ORDER — ZINC OXIDE 20 G/100G
OINTMENT TOPICAL 2 TIMES DAILY
Status: DISCONTINUED | OUTPATIENT
Start: 2018-05-29 | End: 2018-05-30 | Stop reason: HOSPADM

## 2018-05-29 RX ORDER — LIDOCAINE HYDROCHLORIDE 20 MG/ML
INJECTION, SOLUTION EPIDURAL; INFILTRATION; INTRACAUDAL; PERINEURAL
Status: DISCONTINUED
Start: 2018-05-29 | End: 2018-05-30 | Stop reason: HOSPADM

## 2018-05-29 RX ORDER — PROPOFOL 10 MG/ML
VIAL (ML) INTRAVENOUS
Status: DISCONTINUED | OUTPATIENT
Start: 2018-05-29 | End: 2018-05-29

## 2018-05-29 RX ADMIN — POTASSIUM CHLORIDE 10 MEQ: 10 INJECTION, SOLUTION INTRAVENOUS at 10:05

## 2018-05-29 RX ADMIN — SODIUM CHLORIDE: 9 INJECTION, SOLUTION INTRAVENOUS at 12:05

## 2018-05-29 RX ADMIN — PROPOFOL 10 MG: 10 INJECTION, EMULSION INTRAVENOUS at 01:05

## 2018-05-29 RX ADMIN — POTASSIUM CHLORIDE 10 MEQ: 10 INJECTION, SOLUTION INTRAVENOUS at 09:05

## 2018-05-29 RX ADMIN — ALTEPLASE 2 MG: 2.2 INJECTION, POWDER, LYOPHILIZED, FOR SOLUTION INTRAVENOUS at 07:05

## 2018-05-29 RX ADMIN — SODIUM CHLORIDE, PRESERVATIVE FREE 10 ML: 5 INJECTION INTRAVENOUS at 05:05

## 2018-05-29 RX ADMIN — CEFAZOLIN SODIUM 2 G: 2 SOLUTION INTRAVENOUS at 04:05

## 2018-05-29 RX ADMIN — ACETAMINOPHEN 650 MG: 325 TABLET, FILM COATED ORAL at 03:05

## 2018-05-29 RX ADMIN — IPRATROPIUM BROMIDE AND ALBUTEROL SULFATE 3 ML: .5; 3 SOLUTION RESPIRATORY (INHALATION) at 12:05

## 2018-05-29 RX ADMIN — AMLODIPINE BESYLATE 10 MG: 5 TABLET ORAL at 03:05

## 2018-05-29 RX ADMIN — AMIODARONE HYDROCHLORIDE 200 MG: 200 TABLET ORAL at 03:05

## 2018-05-29 RX ADMIN — TAMSULOSIN HYDROCHLORIDE 0.4 MG: 0.4 CAPSULE ORAL at 03:05

## 2018-05-29 RX ADMIN — KETAMINE HYDROCHLORIDE 15 MG: 100 INJECTION, SOLUTION, CONCENTRATE INTRAMUSCULAR; INTRAVENOUS at 01:05

## 2018-05-29 RX ADMIN — LACTOBACILLUS ACIDOPHILUS / LACTOBACILLUS BULGARICUS 1 EACH: 100 MILLION CFU STRENGTH GRANULES at 09:05

## 2018-05-29 RX ADMIN — POTASSIUM CHLORIDE 10 MEQ: 10 INJECTION, SOLUTION INTRAVENOUS at 05:05

## 2018-05-29 RX ADMIN — IPRATROPIUM BROMIDE AND ALBUTEROL SULFATE 3 ML: .5; 3 SOLUTION RESPIRATORY (INHALATION) at 07:05

## 2018-05-29 RX ADMIN — MONTELUKAST SODIUM 10 MG: 10 TABLET, FILM COATED ORAL at 09:05

## 2018-05-29 RX ADMIN — CEFAZOLIN SODIUM 2 G: 2 SOLUTION INTRAVENOUS at 11:05

## 2018-05-29 RX ADMIN — GABAPENTIN 300 MG: 300 CAPSULE ORAL at 09:05

## 2018-05-29 RX ADMIN — ASPIRIN 325 MG ORAL TABLET 325 MG: 325 PILL ORAL at 03:05

## 2018-05-29 RX ADMIN — NICOTINE 1 PATCH: 14 PATCH, EXTENDED RELEASE TRANSDERMAL at 03:05

## 2018-05-29 RX ADMIN — IPRATROPIUM BROMIDE AND ALBUTEROL SULFATE 3 ML: .5; 3 SOLUTION RESPIRATORY (INHALATION) at 03:05

## 2018-05-29 RX ADMIN — FUROSEMIDE 40 MG: 10 INJECTION, SOLUTION INTRAVENOUS at 04:05

## 2018-05-29 RX ADMIN — SODIUM CHLORIDE, PRESERVATIVE FREE 10 ML: 5 INJECTION INTRAVENOUS at 12:05

## 2018-05-29 RX ADMIN — NYSTATIN 500000 UNITS: 100000 SUSPENSION ORAL at 09:05

## 2018-05-29 RX ADMIN — SODIUM CHLORIDE, PRESERVATIVE FREE 10 ML: 5 INJECTION INTRAVENOUS at 04:05

## 2018-05-29 RX ADMIN — THIAMINE HCL TAB 100 MG 100 MG: 100 TAB at 03:05

## 2018-05-29 RX ADMIN — BENAZEPRIL HYDROCHLORIDE 40 MG: 10 TABLET, FILM COATED ORAL at 03:05

## 2018-05-29 RX ADMIN — ENOXAPARIN SODIUM 40 MG: 100 INJECTION SUBCUTANEOUS at 05:05

## 2018-05-29 RX ADMIN — LIDOCAINE 1 PATCH: 50 PATCH TOPICAL at 05:05

## 2018-05-29 RX ADMIN — IPRATROPIUM BROMIDE AND ALBUTEROL SULFATE 3 ML: .5; 3 SOLUTION RESPIRATORY (INHALATION) at 11:05

## 2018-05-29 RX ADMIN — POTASSIUM CHLORIDE 10 MEQ: 10 INJECTION, SOLUTION INTRAVENOUS at 06:05

## 2018-05-29 RX ADMIN — SODIUM CHLORIDE, PRESERVATIVE FREE 10 ML: 5 INJECTION INTRAVENOUS at 07:05

## 2018-05-29 RX ADMIN — Medication 1 TABLET: at 03:05

## 2018-05-29 RX ADMIN — DAPTOMYCIN 1000 MG: 500 INJECTION, POWDER, LYOPHILIZED, FOR SOLUTION INTRAVENOUS at 12:05

## 2018-05-29 RX ADMIN — NYSTATIN 500000 UNITS: 100000 SUSPENSION ORAL at 05:05

## 2018-05-29 RX ADMIN — LIDOCAINE HYDROCHLORIDE 50 MG: 20 INJECTION, SOLUTION INTRAVENOUS at 01:05

## 2018-05-29 RX ADMIN — CEFAZOLIN SODIUM 2 G: 2 SOLUTION INTRAVENOUS at 07:05

## 2018-05-29 RX ADMIN — PROPOFOL 20 MG: 10 INJECTION, EMULSION INTRAVENOUS at 01:05

## 2018-05-29 NOTE — PROGRESS NOTES
Progress Note  Infectious Disease    Follow-up For:  MSSA sepsis    SUBJECTIVE:   ROS:   5/28: Interim reviewed. daptomycin added for an additional positive blood culture. Temp curve was trending down at that time, now afebrile. Looks much better with fluid off of him. He is mentating more clearly. He is anxious to go home.  EDUAR tomorrow, as his oxygen requirement is down to 2 liters nasal cannula. WBC scan was negative. He still has a little back pain, he hopes that his bed at home will resolve this.   5/29: EDUAR negative, but he will be treated for endocarditis. He is anxious to go home. Long talk about safety, and all the things that have to be arranged prior to discharge. So far, no new positive blood cultures.     Antibiotics     Start     Stop Route Frequency Ordered    05/27/18 1030  DAPTOmycin (CUBICIN) 1,000 mg in sodium chloride 0.9% IVPB      06/17 1029 IV Every 24 hours (non-standard times) 05/27/18 0855    05/21/18 1400  cefazolin (ANCEF) 2 gram in dextrose 5% 50 mL IVPB (premix)      -- IV Every 8 hours (non-standard times) 05/21/18 1245          Pertinent Medications noted:    EXAM & DIAGNOSTICS REVIEWED:   Vitals:     Temp:  [97.4 °F (36.3 °C)-99 °F (37.2 °C)]   Temp: 97.4 °F (36.3 °C) (05/29/18 1521)  Pulse: 69 (05/29/18 1547)  Resp: 18 (05/29/18 1547)  BP: (!) 171/73 (05/29/18 1521)  SpO2: 99 % (05/29/18 1547)    Intake/Output Summary (Last 24 hours) at 05/29/18 1722  Last data filed at 05/29/18 1402   Gross per 24 hour   Intake              460 ml   Output                0 ml   Net              460 ml   Temp:  [97.4 °F (36.3 °C)-103.2 °F (39.6 °C)]  range    Down to 2 liters nasal cannula  General:  In NAD. Looks fairly comfortable, less edema  Eyes:  Anicteric, PERRL EOMI,   ENT:  No oral lesions  Neck:  Trachea midline, supple, no adenopathy appreciated  Lung                 Decreased Bs with minimal RLL crackles, same as yesterday  Heart:  RRR, no gallop/rub, ?soft systolic murmur.  tachy.    Abd:  soft, NT, ND, normal BS, no masses/organomegaly appreciated. protuberant  :  Voids , but wearing a brief  Musc:  Joints without effusion, erythema, synovitis,   Skin:  Generally warm, dry, normal for color. Has small early follicular abscess right posterior thigh  Wound:   Neuro: AAOx3, speech clear, moves all extrems equally. Fairly appropriate at present  Extrem: No palpable edema, erythema, phlebitis, cellulitis, synovitis  VAD:  picc right arm    Lines/Tubes/Drains:    General Labs reviewed:    Recent Labs  Lab 05/29/18  0451   WBC 7.90   RBC 2.60*   HGB 8.7*   HCT 25.9*   *   *   MCH 33.5*   MCHC 33.6       Recent Labs  Lab 05/29/18  0451   CALCIUM 8.5*   PROT 6.4      K 3.4*   CO2 30*      BUN 21   CREATININE 1.2   ALKPHOS 73   ALT 24   AST 26   BILITOT 0.3       Micro: MSSA from blood cultures 5/17, 5/19, 5/22, 05/25  Sputum culture: normal sharron  Urine culture negative    Microbiology Results (last 7 days)     Procedure Component Value Units Date/Time    Blood culture [[id number]] Collected:  05/28/18 0419    Order Status:  Completed Specimen:  Blood from Line, PICC Right  Arm Updated:  05/29/18 1612     Blood Culture, Routine No Growth to date     Blood Culture, Routine No Growth to date    Blood culture [[id number]] Collected:  05/24/18 0549    Order Status:  Completed Specimen:  Blood Updated:  05/29/18 1212     Blood Culture, Routine No growth after 5 days.    Blood culture [[id number]] Collected:  05/25/18 1936    Order Status:  Completed Specimen:  Blood from Line, PICC Right  Neck Updated:  05/29/18 1002     Blood Culture, Routine Gram stain aer bottle: Gram positive cocci in clusters resembling Staph      Blood Culture, Routine Results called to and read back by: Janette Bray LPN 05/27/2018  08:15     Blood Culture, Routine --     STAPHYLOCOCCUS AUREUS  Susceptibility pending  ID consult required at [facility] and [facility] locations.      Narrative:       Draw  from picc    Blood culture [[id number]] Collected:  05/29/18 0452    Order Status:  Sent Specimen:  Blood Updated:  05/29/18 0932    Blood culture [[id number]] Collected:  05/23/18 0635    Order Status:  Completed Specimen:  Blood from Antecubital, Left Updated:  05/28/18 1212     Blood Culture, Routine No growth after 5 days.    Urine culture [[id number]] Collected:  05/26/18 0155    Order Status:  Completed Specimen:  Urine from Urine, Clean Catch Updated:  05/27/18 1356     Urine Culture, Routine No growth    Culture, Respiratory with Gram Stain [[id number]] Collected:  05/26/18 0430    Order Status:  Canceled Specimen:  Respiratory from Sputum, Expectorated Updated:  05/26/18 1318    Blood culture [[id number]]  (Susceptibility) Collected:  05/22/18 0453    Order Status:  Completed Specimen:  Blood from Antecubital, Right  Arm Updated:  05/25/18 1042     Blood Culture, Routine Gram stain aer bottle: Gram positive cocci in clusters resembling Staph      Blood Culture, Routine Results called to and read back by:Jigna Rios RN 05/23/2018  11:31     Blood Culture, Routine --     STAPHYLOCOCCUS AUREUS  ID consult required at [facility] and [facility] locations.            Imaging Reviewed: CT Chest  Impression   Patchy bilateral upper lobe interstitial infiltrates.  The upper lobe predominance suggest sarcoidosis or a rather atypical infectious process.  Cavitation, pleural thickening or other evidence of tuberculosis is not seen.  Small right pleural effusion and trace left pleural effusion is noted with moderate atelectasis at both lung bases.  Consolidation at the left lung base is not seen.  Mild cardiomegaly.  Coronary artery calcification.     Bone scan: 5/20  Impression   There is no scintigraphic evidence of metastatic disease or osseous destruction..   MRI thoracic spine could not be done due to girth.    Abdomen/pelvis Ct 5/23  Impression   Bilateral small pleural effusions and basilar atelectasis slightly  increased in size from the prior study.  Chronic degenerative disc disease from L2-L5.  Mild prominence of the left adrenal gland without a focal mass seen.  Atherosclerosis       CXR: Bilateral infiltrates left more so than right, decreased in the perihilar region but increased basilar particularly on the right compared to the prior exam    CXR 05/27/2018 Decrease of the bilateral infiltrates compared to the prior exam    WBC scan There is no abnormal tracer localization suggesting abscess.  Physiologic activity is seen  5/29 CXR improved      ASSESSMENT & PLAN      1.         Staph aureus bacteremia(MSSA) source unclear. Persistent  05/17/2018-05/25/2018               Small early abscess right posterior thigh, emanating from a follicular lesion. EDUAR negative  2.         Pulmonary edema, improved, requiring bipap intermittently  3.         Alcohol abuse, with suspect withdrawal, on librium with intermittent confusion  4.         Diabetes  5.         TKA right, and SQ loop recorder in situ  6. Mid back pain(resolved). negative bone scan  7.         Smoker  8.         CAD, HTN, PAF    Recommendation:   Ancef 2 g IV q 8  continue Daptomycin for now. Prelim home IV orders placed last night.  Stopping atorvastatin on dapto  Counseled about CPAP, alcohol abstinence, blood sugar control, oxygen compliance, no smoking  Counseled about things that have to be established prior to discharge: PT approval for home, as he will only have his wife there, oxygen, home IVs, home health and home PT, plans for sleep study, recs from cardiology about diuretic doses etc.  At some point in the near future we will pursue open MRI for spine        Patient Problem List   Diagnosis    BPH (benign prostatic hypertrophy)    Nocturia    Testicular hypofunction    Community acquired pneumonia of left lower lobe of lung    Severe sepsis    Acute hypoxemic respiratory failure    Paroxysmal atrial fibrillation    CAD (coronary artery  disease)    HTN (hypertension)    Type 2 diabetes mellitus with hyperglycemia    Alcohol use    Tobacco abuse    ZACHARY (acute kidney injury)    Obstructive sleep apnea    COPD with acute lower respiratory infection

## 2018-05-29 NOTE — PROGRESS NOTES
Progress Note  PULMONARY  2  Admit Date: 5/17/2018 05/29/2018      SUBJECTIVE:     May 22, alert, uses hospital bipap but dislikes home units in past.  No home o2, usually good function.  Having pleuritic chest pain rather   May 23, alert, on 5 lpm sat 94%, confused last pm.  Uses niv.  May 24, still lingering left anterior chest pain (was more posterior),  Had lasix response wrt increase uo.  Used bipap last pm , still on 5lpm  May 28, gas exchange improved as only on 2-3 lpm.  Still leg edema, uses niv for sleep. Less delirium.  May 29, snoring, arouses groggy.      PFSH and Allergies reviewed.    OBJECTIVE:     Vitals (Most recent):  Vitals:    05/29/18 0705   BP:    Pulse: 65   Resp: 16   Temp:        Vitals (24 hour range):  Temp:  [97.4 °F (36.3 °C)-98.9 °F (37.2 °C)]   Pulse:  [64-96]   Resp:  [14-22]   BP: (134-172)/(61-80)   SpO2:  [96 %-100 %]       Intake/Output Summary (Last 24 hours) at 05/29/18 0807  Last data filed at 05/28/18 1842   Gross per 24 hour   Intake             1420 ml   Output                0 ml   Net             1420 ml          Physical Exam:  The patient's neuro status (alertness,orientation,cognitive function,motor skills,), pharyngeal exam (oral lesions, hygiene, abn dentition,), Neck (jvd,mass,thyroid,nodes in neck and above/below clavicle),RESPIRATORY(symmetry,effort,fremitus,percussion,auscultation),  Cor(rhythm,heart tones including gallops,perfusion,edema)ABD(distention,hepatic&splenomegaly,tenderness,masses), Skin(rash,cyanosis),Psyc(affect,judgement,).  Exam negative except for these pertinent findings:    Morbid obese, M4, edema legs +1 brawney- improved. Decreased bs, no distress on high flow ox. Symmetric,nl fremitus/percussion, oral yeast gone    Myocardial Findings     Left Ventricle ejection fraction at 65%. Normal left ventricle diastolic function. Normal left atrial pressure.      Right Ventricle Normal ejection fraction.      Left Atrium Cavity is severely dilated         Results for MEKA MEYER (MRN [id number]) as of 5/23/2018 16:39   Ref. Range 5/23/2018 09:47   POC PH Latest Ref Range: 7.35 - 7.45  7.419   POC PCO2 Latest Ref Range: 35 - 45 mmHg 39.2   POC PO2 Latest Ref Range: 80 - 100 mmHg 62 (L)       Radiographs reviewed: view by direct vision  cxr worsened from admit 5/18 to 5/20, ct with patchy gg infiltrates bilat with pleural eff /thickening. - cxr 5/22 still left >>right lung edema.  5/27 imporved edema/infiltrates  Results for orders placed during the hospital encounter of 05/17/18   X-Ray Chest 1 View    Narrative EXAMINATION:  XR CHEST 1 VIEW    CLINICAL HISTORY:  pneumonia; Lobar pneumonia, unspecified organism    TECHNIQUE:  Single frontal view of the chest was performed.    COMPARISON:  Chest of 19 May 2018.    FINDINGS:  There is mild cardiomegaly.  There is continued moderate pulmonary edema.  There is decreased density at the left lower lobe consistent with resolving pneumonia.      Impression Resolving left lower lobe pneumonia.  Continued moderate pulmonary edema and cardiomegaly consistent with congestive heart failure.      Electronically signed by: Renny Cuevas MD  Date:    05/20/2018  Time:    08:35   ]Impression       Patchy bilateral upper lobe interstitial infiltrates.  The upper lobe predominance suggest sarcoidosis or a rather atypical infectious process.  Cavitation, pleural thickening or other evidence of tuberculosis is not seen.  Small right pleural effusion and trace left pleural effusion is noted with moderate atelectasis at both lung bases.  Consolidation at the left lung base is not seen.  Mild cardiomegaly.  Coronary artery calcification.      Electronically signed by: Renny Cuevas MD  Date: 05/20/2018  Time: 10:35         Labs       Recent Labs  Lab 05/29/18  0451   WBC 7.90   HGB 8.7*   HCT 25.9*   *       Recent Labs  Lab 05/29/18  0451      K 3.4*      CO2 30*   BUN 21   CREATININE 1.2   *    CALCIUM 8.5*   AST 26   ALT 24   ALKPHOS 73   BILITOT 0.3   PROT 6.4   ALBUMIN 2.1*   CRP 88.4*     No results for input(s): PH, PCO2, PO2, HCO3 in the last 24 hours.  Microbiology Results (last 7 days)     Procedure Component Value Units Date/Time    Blood culture [[id number]] Collected:  05/29/18 0452    Order Status:  Sent Specimen:  Blood Updated:  05/29/18 0452    Blood culture [[id number]] Collected:  05/28/18 0419    Order Status:  Completed Specimen:  Blood from Line, PICC Right  Arm Updated:  05/28/18 2115     Blood Culture, Routine No Growth to date    Blood culture [[id number]] Collected:  05/24/18 0549    Order Status:  Completed Specimen:  Blood Updated:  05/28/18 1212     Blood Culture, Routine No Growth to date     Blood Culture, Routine No Growth to date     Blood Culture, Routine No Growth to date     Blood Culture, Routine No Growth to date     Blood Culture, Routine No Growth to date    Blood culture [[id number]] Collected:  05/23/18 0635    Order Status:  Completed Specimen:  Blood from Antecubital, Left Updated:  05/28/18 1212     Blood Culture, Routine No growth after 5 days.    Blood culture [[id number]] Collected:  05/25/18 1936    Order Status:  Completed Specimen:  Blood from Line, PICC Right  Neck Updated:  05/28/18 0841     Blood Culture, Routine Gram stain aer bottle: Gram positive cocci in clusters resembling Staph      Blood Culture, Routine Results called to and read back by: Janette Bray LPN 05/27/2018  08:15     Blood Culture, Routine --     STAPHYLOCOCCUS AUREUS  Susceptibility pending  ID consult required at [facility] [facility].      Narrative:       Draw from picc    Urine culture [[id number]] Collected:  05/26/18 0155    Order Status:  Completed Specimen:  Urine from Urine, Clean Catch Updated:  05/27/18 1356     Urine Culture, Routine No growth    Culture, Respiratory with Gram Stain [[id number]] Collected:  05/26/18 0430    Order Status:  Canceled Specimen:   Respiratory from Sputum, Expectorated Updated:  05/26/18 1318    Blood culture [[id number]]  (Susceptibility) Collected:  05/22/18 0453    Order Status:  Completed Specimen:  Blood from Antecubital, Right  Arm Updated:  05/25/18 1042     Blood Culture, Routine Gram stain aer bottle: Gram positive cocci in clusters resembling Staph      Blood Culture, Routine Results called to and read back by:Jigna Rios RN 05/23/2018  11:31     Blood Culture, Routine --     STAPHYLOCOCCUS AUREUS  ID consult required at [facility] and [facility] locations.      Blood culture [[id number]] Collected:  05/19/18 1356    Order Status:  Completed Specimen:  Blood Updated:  05/22/18 1454     Blood Culture, Routine Gram stain aer bottle: Gram positive cocci in clusters resembling Staph      Blood Culture, Routine Results called to and read back by: Vivian Castillo RN  05/20/2018  22:34     Blood Culture, Routine --     STAPHYLOCOCCUS AUREUS  ID consult required at [facility] and [facility] locations.  For susceptibility see order #[id number]      Narrative:       Blood cultures x 2    Blood culture [[id number]]  (Susceptibility) Collected:  05/19/18 1346    Order Status:  Completed Specimen:  Blood Updated:  05/22/18 1454     Blood Culture, Routine Gram stain aer bottle: Gram positive cocci in clusters resembling Staph      Blood Culture, Routine Results called to and read back by: Radha Mendoza RN  05/20/2018  19:40     Blood Culture, Routine --     STAPHYLOCOCCUS AUREUS  ID consult required at [facility] and [facility] locations.      Narrative:       X 2 sets    Culture, Respiratory with Gram Stain [[id number]] Collected:  05/20/18 1056    Order Status:  Completed Specimen:  Respiratory from Sputum Updated:  05/22/18 1255     Respiratory Culture Normal respiratory sharron     Gram Stain (Respiratory) <10 epithelial cells per low power field.     Gram Stain (Respiratory) Many WBC's     Gram Stain (Respiratory) Rare Gram positive cocci       Results for MEKA MEYER (MRN [id number]) as of 5/22/2018 17:18   Ref. Range 5/19/2018 00:04 5/19/2018 01:27   POC PH Latest Ref Range: 7.35 - 7.45  7.410 7.482 (H)   POC PCO2 Latest Ref Range: 35 - 45 mmHg 35.9 33.3 (L)   POC PO2 Latest Ref Range: 80 - 100 mmHg 41 (LL) 219 (H)       Impression:  Active Hospital Problems    Diagnosis  POA    *Acute hypoxemic respiratory failure [J96.01]  Yes    Obstructive sleep apnea [G47.33]  Yes    COPD with acute lower respiratory infection [J44.0]  Yes    Alcohol use [Z78.9]  Yes    Tobacco abuse [Z72.0]  Yes    ZACHARY (acute kidney injury) [N17.9]  Yes    Community acquired pneumonia of left lower lobe of lung [J18.1]  Yes    Severe sepsis [A41.9, R65.20]  Yes    Paroxysmal atrial fibrillation [I48.0]  Yes    CAD (coronary artery disease) [I25.10]  Yes    HTN (hypertension) [I10]  Yes    Type 2 diabetes mellitus with hyperglycemia [E11.65]  Yes      Resolved Hospital Problems    Diagnosis Date Resolved POA   No resolved problems to display.               Plan:     May 22, will f/u cxr.  Should clear resp failure with rx staph.  Copd  And osas and cor pulmonale may play role?  Echo suggest mitral dz/left heart problem?  May 23,  cxr sluggish, still left infiltrate, bnp 361-will give 20 lasix ivp x1 .  Adjust niv.    May 24, will dose lasix 20 again as still edema and labs ok.  Use niv for sleep and f/u cxr.  Seems sl better - delirium makes eval difficult?  mycostatin  May 28- looks like improving lung wise, cxr sl better and gas exchange better- should be good for EDUAR.      May 29, leg edema lingering on lasix 40 iv bid. Gas exchange better - not using cpap/niv- long term compliance seems doubtful.  Has element of copd with lung infection - seems very stable.  Still had + staph in blood on 5/25 - 7 days post admit.  Pt's alcohol use pta makes management post hosp ?    [place] [facility] records reviewed- has been on amiodarone for a fib since 2012  cardioversion?   rm air sat apparently was 98% nov 2017.                 .

## 2018-05-29 NOTE — PT/OT/SLP PROGRESS
Physical Therapy      Patient Name:  Jun Stuart   MRN:  [id number]    Patient not seen today secondary to Unavailable (Comment) (pt still sleeping in AM and out of room for EDUAR ). Will follow-up as schedule permits.    Sana Negrete, PTA

## 2018-05-29 NOTE — PROGRESS NOTES
"Progress Note  Hospital Medicine  Patient Name:Jun Stuart  MRN:  [id number]  Patient Class: IP- Inpatient  Admit Date: 5/17/2018  Length of Stay: 12 days  Expected Discharge Date:   Attending Physician: Erik Granados MD  Primary Care Provider:  Juanjo Park MD    SUBJECTIVE:     Principal Problem: Acute hypoxemic respiratory failure  Initial history of present illness: Jun Stuart is a 72 y.o. Male with PMHx significant for afib, CAD, GERD, HTN, DM and HLD.  He is admitted to the service of hospital medicine with severe sepsis and acute hypoxemic respiratory failure. He presented to the ED via EMS for further evaluation of confusion.  His wife stated that  this am the patient awoke, he began complaining of upper back pain. He was brought to [facility], where the wife states that patient had an "x-ray and EKG." She states that the patient was then given pain medication and discharged. The patient took the pain medication and then drank alcohol.  He became nausous and had 1 episode of emesis.  Wife stated he went to bed and when he tried to get up to go to the bathroom, he couldn't, so she called EMS.  She reported that he had been confused after the pain medication and alcohol.  She stated that he had chills while at [facility], but did not take his temperature.  She endorsed the patient has had and increase in cough that is nonproductive. EMS states that the patient had an CBG of 193 and was 91% on RA on arrival.    PMH/PSH/SH/FH/Meds: reviewed.    Symptoms/Review of Systems:  Afebrile, repeat blood cultures from 05-25-18 again positive for Staph Aureus sp. Patient is on 2-3 L/min NC supplemental oxygen. Using BiPAP nightly. Indium WBC tagged scan is negative. No chest pain or headache, fever or abdominal pain. Patient is scheduled for EDUAR today.  Diet: NPO  Activity level: Up with assistance   Pain:  Patient reports no pain.       OBJECTIVE:   Vital Signs (Most Recent):      Temp: 98.9 " °F (37.2 °C) (05/29/18 0300)  Pulse: 65 (05/29/18 0705)  Resp: 16 (05/29/18 0705)  BP: (!) 142/63 (05/29/18 0300)  SpO2: 100 % (05/29/18 0705)       Vital Signs Range (Last 24H):  Temp:  [97.4 °F (36.3 °C)-98.9 °F (37.2 °C)]   Pulse:  [64-96]   Resp:  [16-22]   BP: (134-161)/(61-80)   SpO2:  [96 %-100 %]     Weight: 131.9 kg (290 lb 12.6 oz)  Body mass index is 39.44 kg/m².    Intake/Output Summary (Last 24 hours) at 05/29/18 0818  Last data filed at 05/28/18 1842   Gross per 24 hour   Intake             1420 ml   Output                0 ml   Net             1420 ml     Physical Examination:  Constitutional: He is oriented to person, place, and time. He appears well-developed and well-nourished. No distress.   Obese   HENT:   Head: Normocephalic and atraumatic.   Mouth/Throat: Oropharynx is clear and moist.   Eyes: Conjunctivae and EOM are normal. Pupils are equal, round, and reactive to light. No scleral icterus.   Neck: Normal range of motion. Neck supple. No JVD present. No tracheal deviation present. No thyromegaly present.   Cardiovascular: Normal rate, regular rhythm, normal heart sounds and intact distal pulses.  Exam reveals no gallop and no friction rub.    No murmur heard.  Pulses:       Dorsalis pedis pulses are 2+ on the right side, and 2+ on the left side.   Pulmonary/Chest: No accessory muscle usage. No respiratory distress. He has rhonchi. He has no rales.   Abdominal: Soft. Bowel sounds are normal. He exhibits no distension and no mass. There is no tenderness. There is no guarding.   Musculoskeletal: Normal range of motion. He exhibits no edema, tenderness or deformity.   Neurological: He is alert and oriented to person, place, and time. He has normal strength. He exhibits normal muscle tone. Coordination normal.   Skin: Skin is warm and intact. Capillary refill takes less than 2 seconds. No rash noted.   Psychiatric: He has a normal mood and affect. His speech is normal and behavior is normal.  Judgment and thought content normal.   Vitals reviewed.  CRANIAL NERVES   CN III, IV, VI   Pupils are equal, round, and reactive to light.  Extraocular motions are normal.     CBC:    Recent Labs  Lab 05/27/18  0507 05/28/18 0419 05/29/18 0451   WBC 9.20 8.80 7.90   RBC 2.75* 2.75* 2.60*   HGB 9.2* 9.2* 8.7*   HCT 27.1* 27.6* 25.9*   * 112* 111*   MCV 99* 100* 100*   MCH 33.5* 33.4* 33.5*   MCHC 34.0 33.4 33.6   BMP    Recent Labs  Lab 05/27/18  0507 05/28/18 0419 05/29/18 0451   * 146* 137*    136 138   K 3.6 3.6 3.4*   CL 98 98 100   CO2 28 28 30*   BUN 24* 20 21   CREATININE 1.2 1.1 1.2   CALCIUM 9.0 9.0 8.5*      Diagnostic Results:  Microbiology Results (last 7 days)     Procedure Component Value Units Date/Time    Blood culture [[id number]] Collected:  05/29/18 0452    Order Status:  Sent Specimen:  Blood Updated:  05/29/18 0452    Blood culture [[id number]] Collected:  05/28/18 0419    Order Status:  Completed Specimen:  Blood from Line, PICC Right  Arm Updated:  05/28/18 2115     Blood Culture, Routine No Growth to date    Blood culture [[id number]] Collected:  05/24/18 0549    Order Status:  Completed Specimen:  Blood Updated:  05/28/18 1212     Blood Culture, Routine No Growth to date     Blood Culture, Routine No Growth to date     Blood Culture, Routine No Growth to date     Blood Culture, Routine No Growth to date     Blood Culture, Routine No Growth to date    Blood culture [[id number]] Collected:  05/23/18 0635    Order Status:  Completed Specimen:  Blood from Antecubital, Left Updated:  05/28/18 1212     Blood Culture, Routine No growth after 5 days.    Blood culture [[id number]] Collected:  05/25/18 1936    Order Status:  Completed Specimen:  Blood from Line, PICC Right  Neck Updated:  05/28/18 0841     Blood Culture, Routine Gram stain aer bottle: Gram positive cocci in clusters resembling Staph      Blood Culture, Routine Results called to and read back by: Janette Bray LPN  05/27/2018  08:15     Blood Culture, Routine --     STAPHYLOCOCCUS AUREUS  Susceptibility pending  ID consult required at [facility].      Narrative:       Draw from picc    Urine culture [[id number]] Collected:  05/26/18 0155    Order Status:  Completed Specimen:  Urine from Urine, Clean Catch Updated:  05/27/18 1356     Urine Culture, Routine No growth    Culture, Respiratory with Gram Stain [[id number]] Collected:  05/26/18 0430    Order Status:  Canceled Specimen:  Respiratory from Sputum, Expectorated Updated:  05/26/18 1318    Blood culture [[id number]]  (Susceptibility) Collected:  05/22/18 0453    Order Status:  Completed Specimen:  Blood from Antecubital, Right  Arm Updated:  05/25/18 1042     Blood Culture, Routine Gram stain aer bottle: Gram positive cocci in clusters resembling Staph      Blood Culture, Routine Results called to and read back by:Jigna Rios RN 05/23/2018  11:31     Blood Culture, Routine --     STAPHYLOCOCCUS AUREUS  ID consult required at [facility].      Blood culture [[id number]] Collected:  05/19/18 1356    Order Status:  Completed Specimen:  Blood Updated:  05/22/18 1454     Blood Culture, Routine Gram stain aer bottle: Gram positive cocci in clusters resembling Staph      Blood Culture, Routine Results called to and read back by: Vivian Castillo RN  05/20/2018  22:34     Blood Culture, Routine --     STAPHYLOCOCCUS AUREUS  ID consult required at [facility].  For susceptibility see order #[id number]      Narrative:       Blood cultures x 2    Blood culture [[id number]]  (Susceptibility) Collected:  05/19/18 1346    Order Status:  Completed Specimen:  Blood Updated:  05/22/18 1454     Blood Culture, Routine Gram stain aer bottle: Gram positive cocci in clusters resembling Staph      Blood Culture, Routine Results called to and read back by: Radha Mendoza RN  05/20/2018  19:40     Blood Culture, Routine --      STAPHYLOCOCCUS AUREUS  ID consult required at [facility] and [facility] locations.      Narrative:       X 2 sets    Culture, Respiratory with Gram Stain [[id number]] Collected:  05/20/18 1056    Order Status:  Completed Specimen:  Respiratory from Sputum Updated:  05/22/18 1255     Respiratory Culture Normal respiratory sharron     Gram Stain (Respiratory) <10 epithelial cells per low power field.     Gram Stain (Respiratory) Many WBC's     Gram Stain (Respiratory) Rare Gram positive cocci         CXR: New left lower lobe pneumonia.  Cardiomegaly.  Prominence of the pulmonary vasculature may represent borderline heart function.    CXR: Congestive heart failure with mild to moderate pulmonary edema.  Additional dense infiltrate in the left lower lobe consistent with pneumonia.    CXR: Resolving left lower lobe pneumonia.  Continued moderate pulmonary edema and cardiomegaly consistent with congestive heart failure.    CT chest without contrast:  Patchy bilateral upper lobe interstitial infiltrates.  The upper lobe predominance suggest sarcoidosis or a rather atypical infectious process.  Cavitation, pleural thickening or other evidence of tuberculosis is not seen.  Small right pleural effusion and trace left pleural effusion is noted with moderate atelectasis at both lung bases.  Consolidation at the left lung base is not seen.  Mild cardiomegaly.  Coronary artery calcification.    Bone scan: There is no scintigraphic evidence of metastatic disease or osseous destruction.    ECHO:  · Left atrium is severely dilated.  · LVEF appears to be normal      Performing Sonographer     Vanita Guzman    Reason For Exam   Priority: Routine   Dx: Acute respiratory failure with hypoxemia [J96.01 (ICD-10-CM)]      Vitals     Height Weight BMI (Calculated) BSA (Calculated - sq m) BP   6' (1.829 m) 134.3 kg (296 lb) 40.2 2.61 sq meters 167/73       Study Details     A complete echo was performed using spectral Doppler.  Overall the study quality was poor. The study had technical difficulties. The study was difficult due to patient's body habitus.   Myocardial Findings     Left Ventricle ejection fraction at 65%. Normal left ventricle diastolic function. Normal left atrial pressure.      Right Ventricle Normal ejection fraction.      Left Atrium Cavity is severely dilated.        CT abdomen and pelvis with contrast:  Bilateral small pleural effusions and basilar atelectasis slightly increased in size from the prior study.  Chronic degenerative disc disease from L2-L5.  Mild prominence of the left adrenal gland without a focal mass seen.  Atherosclerosis.    CXR: Cardiomegaly with moderately increased interstitial markings bilaterally, most compatible with CHF.  This is slightly improved relative to 05/22/2018.  Suspected small pleural effusions also noted.  Continued radiographic follow-up recommended.    CXR: Bilateral infiltrates left more so than right, decreased in the perihilar region but increased basilar particularly on the right compared to the prior exam    CXR: Decrease of the bilateral infiltrates compared to the prior exam    WBC Indium scan: Negative.     Assessment/Plan:     Acute hypoxemic respiratory failure  Community acquired pneumonia of left lower lobe of lung  Staph Aureus Pneumonia, left lower lobe  Staph Aureus Bacteremia  Supplemental O2 via nasal canula; titrate O2 saturation to >92%. Use BiPAP as needed.  Follow pulmonary recommendations.   Continue beta 2 agonist bronchodilator treatments.   Continue IV antibiotics - Ancef + Cubicin.  Check sputum GS and Cx.   Continue routine medications as before.   Whole body scan results reviewed.  Follow EDUAR results.                                                   Type 2 diabetes mellitus with hyperglycemia       Chronic problem.  Uncontrolled hyperglycemia upon admission labs.    Check blood glucose level q AC/HS.  Use Novolog Insulin Sliding Scale as needed.    Continue American Diabetic Association 1800 Kcal diet.            UTI            Follow urine C/S. Continue IV antibiotics.                   ZACHARY (acute kidney injury)       Resolved           Tobacco abuse       Dangers of cigarette smoking were reviewed with patient in detail for 3 minutes and patient was encouraged to quit. Nicotine replacement options were discussed - option to use Nicoderm.           Alcohol use       Reportedly drinks etoh daily without history of DT's  Will utilize multivitamins  Educated on the deleterious effects of alcohol use  Seizure precautions with prn ativan   Fall precautions           HTN (hypertension)       Chronic problem.  Labile.  Continue home antihypertensive regimen and monitor b/p closely.  Adjust medications as necessary.           CAD (coronary artery disease)       Historical diagnosis with stent placement.  Did not present with s/s of ACS.  Will continue home BB, ASA, and Statin therapy.  Cardiac monitoring  Monitor closely for s/s of ACS.           Paroxysmal atrial fibrillation       S/P cardioversion.   Continue home BB, Amiodarone.  Cardiac monitoring.        Discussed with patient's wife and brother.  VTE Risk Mitigation         Ordered     enoxaparin injection 40 mg  Daily      05/25/18 0614     IP VTE LOW RISK PATIENT  Once      05/17/18 7624        Erik Granados MD  Department of Hospital Medicine   [facility]

## 2018-05-29 NOTE — ANESTHESIA POSTPROCEDURE EVALUATION
Anesthesia Post Evaluation    Patient: Jun Stuart    Procedure(s) Performed: Procedure(s) (LRB):  ECHOCARDIOGRAM,TRANSESOPHAGEAL (N/A)    Final Anesthesia Type: general  Patient location during evaluation: PACU  Patient participation: Yes- Able to Participate  Level of consciousness: awake and alert  Post-procedure vital signs: reviewed and stable  Pain management: adequate  Airway patency: patent  PONV status at discharge: No PONV  Anesthetic complications: no      Cardiovascular status: blood pressure returned to baseline and hemodynamically stable  Respiratory status: unassisted  Hydration status: euvolemic  Follow-up not needed.        Visit Vitals  BP (!) 145/65 (BP Location: Left arm, Patient Position: Sitting)   Pulse 65   Temp 36.9 °C (98.5 °F) (Oral)   Resp 16   Ht 6' (1.829 m)   Wt 131.9 kg (290 lb 12.6 oz)   SpO2 100%   BMI 39.44 kg/m²       Pain/Lester Score: Pain Assessment Performed: Yes (5/29/2018  8:00 AM)  Presence of Pain: denies (5/29/2018  8:00 AM)  Pain Rating Prior to Med Admin: 6 (5/28/2018 10:36 PM)  Pain Rating Post Med Admin: 0 (5/28/2018 10:57 AM)  Lester Score: 9 (5/29/2018 12:50 PM)

## 2018-05-29 NOTE — BRIEF OP NOTE
[facility]  Brief Operative Note  Cardiology    SUMMARY     Surgery Date: 5/29/2018     Surgeon(s) and Role:     * Babita Montes MD - Primary    Assisting Surgeon: None    Pre-op Diagnosis:  Severe sepsis [A41.9, R65.20]Community acquired pneumonia of left lower lobe of lung [J18.1]    Post-op Diagnosis: Post-Op Diagnosis Codes:     * Severe sepsis [A41.9, R65.20]     * Community acquired pneumonia of left lower lobe of lung [J18.1]    Procedure Performed: EDUAR    Procedure(s) (LRB):  TRANSESOPHAGEAL ECHOCARDIOGRAM WITH POSSIBLE CARDIOVERSION (EDUAR W/ POSS CARDIOVERSION) (N/A)    Technical Procedures Used: same    Description of the findings of the procedure:  LVEF 70%; normal LV size; normal FROYLAN.  No evidence of endocarditis. Trace MR. No AR. Prominent Eustachian valve. No PFO; mobile IAS with respiration.    Estimated Blood Loss: nil         Specimens:   Specimen (12h ago through future)    None

## 2018-05-29 NOTE — PROGRESS NOTES
Pt. Took off his Bipap and refused to wear it for the rest of the night. He is now on a nc at 4lpm

## 2018-05-29 NOTE — PLAN OF CARE
05/29/18 0025   Patient Assessment/Suction   Level of Consciousness (AVPU) alert   Respiratory Effort Normal;Unlabored   Expansion/Accessory Muscles/Retractions no use of accessory muscles   All Lung Fields Breath Sounds diminished   Rhythm/Pattern, Respiratory depth regular;pattern regular;unlabored   PRE-TX-O2-ETCO2   O2 Device (Oxygen Therapy) nasal cannula   $ Is the patient on Low Flow Oxygen? Yes   Flow (L/min) 4   SpO2 97 %   Pulse Oximetry Type Continuous   $ Pulse Oximetry - Multiple Charge Pulse Oximetry - Multiple   Pulse 69   Resp 16   Aerosol Therapy   $ Aerosol Therapy Charges Aerosol Treatment   Respiratory Treatment Status given   SVN/Inhaler Treatment Route mask   Position During Treatment HOB at 45 degrees   Patient Tolerance good   Post-Treatment   Post-treatment Heart Rate (beats/min) 71   Post-treatment Resp Rate (breaths/min) 16   All Fields Breath Sounds aeration increased

## 2018-05-29 NOTE — TRANSFER OF CARE
Anesthesia Transfer of Care Note    Patient: Jun Stuart    Procedure(s) Performed: Procedure(s) (LRB):  ECHOCARDIOGRAM,TRANSESOPHAGEAL (N/A)    Patient location: Cath Lab    Anesthesia Type: MAC    Transport from OR: Transported from OR on 2-3 L/min O2 by NC with adequate spontaneous ventilation    Post pain: adequate analgesia    Post assessment: no apparent anesthetic complications and tolerated procedure well    Post vital signs: stable    Level of consciousness: awake, alert and oriented    Nausea/Vomiting: no nausea/vomiting    Complications: none    Transfer of care protocol was followed      Last vitals:   Visit Vitals  BP (!) 145/65 (BP Location: Left arm, Patient Position: Sitting)   Pulse 65   Temp 36.9 °C (98.5 °F) (Oral)   Resp 16   Ht 6' (1.829 m)   Wt 131.9 kg (290 lb 12.6 oz)   SpO2 100%   BMI 39.44 kg/m²

## 2018-05-29 NOTE — PROGRESS NOTES
"   05/29/18 0705   Patient Assessment/Suction   Level of Consciousness (AVPU) alert   All Lung Fields Breath Sounds diminished   Cough Type none   PRE-TX-O2-ETCO2   O2 Device (Oxygen Therapy) nasal cannula   $ Is the patient on Low Flow Oxygen? Yes   Flow (L/min) 4  (decreased to 2L)   SpO2 100 %   Pulse Oximetry Type Continuous   $ Pulse Oximetry - Multiple Charge Pulse Oximetry - Multiple   Pulse 65   Resp 16   Aerosol Therapy   $ Aerosol Therapy Charges Aerosol Treatment   Respiratory Treatment Status given   SVN/Inhaler Treatment Route mask   Position During Treatment Sitting in chair   Patient Tolerance good   Post-Treatment   Post-treatment Heart Rate (beats/min) 70   Post-treatment Resp Rate (breaths/min) 16   All Fields Breath Sounds unchanged   Preset CPAP/BiPAP Settings   Mode Of Delivery Standby     Pt sleeping in chair. Wife in room. Wife states she allowed pt to walk without assistance from her & without walker. Advised wife that pt should not be walking alone @ this time. Wife states "I will tell PT myself". Nurse notified.  "

## 2018-05-29 NOTE — PROGRESS NOTES
Progress Note  Infectious Disease    Follow-up For:  MSSA sepsis    SUBJECTIVE:   ROS:   5/28: Interim reviewed. daptomycin added for an additional positive blood culture. Temp curve was trending down at that time, now afebrile. Looks much better with fluid off of him. He is mentating more clearly. He is anxious to go home.  EDUAR tomorrow, as his oxygen requirement is down to 2 liters nasal cannula. WBC scan was negative. He still has a little back pain, he hopes that his bed at home will resolve this.     Antibiotics     Start     Stop Route Frequency Ordered    05/27/18 1030  DAPTOmycin (CUBICIN) 1,000 mg in sodium chloride 0.9% IVPB      06/17 1029 IV Every 24 hours (non-standard times) 05/27/18 0855    05/21/18 1400  cefazolin (ANCEF) 2 gram in dextrose 5% 50 mL IVPB (premix)      -- IV Every 8 hours (non-standard times) 05/21/18 1245          Pertinent Medications noted:    EXAM & DIAGNOSTICS REVIEWED:   Vitals:     Temp:  [97.4 °F (36.3 °C)-98.3 °F (36.8 °C)]   Temp: 97.4 °F (36.3 °C) (05/28/18 1527)  Pulse: 96 (05/28/18 1911)  Resp: (!) 21 (05/28/18 1911)  BP: 134/61 (05/28/18 1527)  SpO2: 96 % (05/28/18 1911)    Intake/Output Summary (Last 24 hours) at 05/28/18 1929  Last data filed at 05/28/18 1842   Gross per 24 hour   Intake             1900 ml   Output                0 ml   Net             1900 ml   Temp:  [97.4 °F (36.3 °C)-103.2 °F (39.6 °C)]  range    Down to 2 liters nasal cannula  General:  In NAD. Looks fairly comfortable,better than ever, less edema  Eyes:  Anicteric, PERRL EOMI,   ENT:  No oral lesions  Neck:  Trachea midline, supple, no adenopathy appreciated  Lung                 Decreased Bs with minimal RLL crackles, much improved  Heart:  RRR, no gallop/rub, ?soft systolic murmur.  tachy.   Abd:  soft, NT, ND, normal BS, no masses/organomegaly appreciated. protuberant  :  Voids   Musc:  Joints without effusion, erythema, synovitis,   Skin:  Generally warm, dry, normal for color.No septic  skin lesions Yeast infection to groin bilaterally  Wound:   Neuro: AAOx3, speech clear, moves all extrems equally. Fairly appropriate at present  Extrem: No palpable edema, erythema, phlebitis, cellulitis, synovitis  VAD:  picc right arm    Lines/Tubes/Drains:    General Labs reviewed:    Recent Labs  Lab 05/28/18 0419   WBC 8.80   RBC 2.75*   HGB 9.2*   HCT 27.6*   *   *   MCH 33.4*   MCHC 33.4       Recent Labs  Lab 05/28/18 0419   CALCIUM 9.0   PROT 6.7      K 3.6   CO2 28   CL 98   BUN 20   CREATININE 1.1   ALKPHOS 80   ALT 37   AST 38   BILITOT 0.4       Micro: MSSA from blood cultures 5/17, 5/19, 5/22, 05/25  Sputum culture: normal sharron  Urine culture negative    Microbiology Results (last 7 days)     Procedure Component Value Units Date/Time    Blood culture [[id number]] Collected:  05/28/18 0419    Order Status:  Sent Specimen:  Blood from Line, PICC Right  Arm Updated:  05/28/18 1236    Blood culture [[id number]] Collected:  05/24/18 0549    Order Status:  Completed Specimen:  Blood Updated:  05/28/18 1212     Blood Culture, Routine No Growth to date     Blood Culture, Routine No Growth to date     Blood Culture, Routine No Growth to date     Blood Culture, Routine No Growth to date     Blood Culture, Routine No Growth to date    Blood culture [[id number]] Collected:  05/23/18 0635    Order Status:  Completed Specimen:  Blood from Antecubital, Left Updated:  05/28/18 1212     Blood Culture, Routine No growth after 5 days.    Blood culture [[id number]] Collected:  05/25/18 1936    Order Status:  Completed Specimen:  Blood from Line, PICC Right  Neck Updated:  05/28/18 0841     Blood Culture, Routine Gram stain aer bottle: Gram positive cocci in clusters resembling Staph      Blood Culture, Routine Results called to and read back by: Janette Bray LPN 05/27/2018  08:15     Blood Culture, Routine --     STAPHYLOCOCCUS AUREUS  Susceptibility pending  ID consult required at [facility]  and [place] locations.      Narrative:       Draw from picc    Urine culture [[id number]] Collected:  05/26/18 0155    Order Status:  Completed Specimen:  Urine from Urine, Clean Catch Updated:  05/27/18 1356     Urine Culture, Routine No growth    Culture, Respiratory with Gram Stain [[id number]] Collected:  05/26/18 0430    Order Status:  Canceled Specimen:  Respiratory from Sputum, Expectorated Updated:  05/26/18 1318    Blood culture [[id number]]  (Susceptibility) Collected:  05/22/18 0453    Order Status:  Completed Specimen:  Blood from Antecubital, Right  Arm Updated:  05/25/18 1042     Blood Culture, Routine Gram stain aer bottle: Gram positive cocci in clusters resembling Staph      Blood Culture, Routine Results called to and read back by:Jigna Rios RN 05/23/2018  11:31     Blood Culture, Routine --     STAPHYLOCOCCUS AUREUS  ID consult required at [facility].      Blood culture [[id number]] Collected:  05/19/18 1356    Order Status:  Completed Specimen:  Blood Updated:  05/22/18 1454     Blood Culture, Routine Gram stain aer bottle: Gram positive cocci in clusters resembling Staph      Blood Culture, Routine Results called to and read back by: Vivian Castillo RN  05/20/2018  22:34     Blood Culture, Routine --     STAPHYLOCOCCUS AUREUS  ID consult required at [facility].  For susceptibility see order #[id number]      Narrative:       Blood cultures x 2    Blood culture [[id number]]  (Susceptibility) Collected:  05/19/18 1346    Order Status:  Completed Specimen:  Blood Updated:  05/22/18 1454     Blood Culture, Routine Gram stain aer bottle: Gram positive cocci in clusters resembling Staph      Blood Culture, Routine Results called to and read back by: Radha Mendoza RN  05/20/2018  19:40     Blood Culture, Routine --     STAPHYLOCOCCUS AUREUS  ID consult required at [facility].      Narrative:       X 2 sets    Culture,  Respiratory with Gram Stain [[id number]] Collected:  05/20/18 1056    Order Status:  Completed Specimen:  Respiratory from Sputum Updated:  05/22/18 1255     Respiratory Culture Normal respiratory sharron     Gram Stain (Respiratory) <10 epithelial cells per low power field.     Gram Stain (Respiratory) Many WBC's     Gram Stain (Respiratory) Rare Gram positive cocci          Imaging Reviewed: CT Chest  Impression   Patchy bilateral upper lobe interstitial infiltrates.  The upper lobe predominance suggest sarcoidosis or a rather atypical infectious process.  Cavitation, pleural thickening or other evidence of tuberculosis is not seen.  Small right pleural effusion and trace left pleural effusion is noted with moderate atelectasis at both lung bases.  Consolidation at the left lung base is not seen.  Mild cardiomegaly.  Coronary artery calcification.     Bone scan: 5/20  Impression   There is no scintigraphic evidence of metastatic disease or osseous destruction..   MRI thoracic spine could not be done due to girth.    Abdomen/pelvis Ct 5/23  Impression   Bilateral small pleural effusions and basilar atelectasis slightly increased in size from the prior study.  Chronic degenerative disc disease from L2-L5.  Mild prominence of the left adrenal gland without a focal mass seen.  Atherosclerosis       CXR: Bilateral infiltrates left more so than right, decreased in the perihilar region but increased basilar particularly on the right compared to the prior exam    CXR 05/27/2018 Decrease of the bilateral infiltrates compared to the prior exam    WBC scan There is no abnormal tracer localization suggesting abscess.  Physiologic activity is seen      ASSESSMENT & PLAN      1.         Staph aureus bacteremia(MSSA) source unclear. Persistent  05/17/2018-05/25/2018              Still cough. ?aspiration. Brown phlegm              Yeast inf to  groin, local care  2.         Pulmonary edema, improved, requiring bipap  intermittently  3.         Alcohol abuse, with suspect withdrawal, on librium with intermittent confusion  4.         Diabetes  5.         TKA right, and SQ loop recorder in situ  6. Mid back pain(resolved). negative bone scan  7.         Smoker  8.         CAD, HTN, PAF    Recommendation:   Ancef 2 g IV q 8  continue Daptomycin for now  EDUAR tomorrow  Repeat daily Blood cultures  Stopping atorvastatin on dapto  Counseled about CPAP, alcohol abstinence, blood sugar control, oxygen compliance, no smoking        Patient Problem List   Diagnosis    BPH (benign prostatic hypertrophy)    Nocturia    Testicular hypofunction    Community acquired pneumonia of left lower lobe of lung    Severe sepsis    Acute hypoxemic respiratory failure    Paroxysmal atrial fibrillation    CAD (coronary artery disease)    HTN (hypertension)    Type 2 diabetes mellitus with hyperglycemia    Alcohol use    Tobacco abuse    ZACHARY (acute kidney injury)    Obstructive sleep apnea    COPD with acute lower respiratory infection

## 2018-05-29 NOTE — ANESTHESIA PREPROCEDURE EVALUATION
05/29/2018  Jun Stuart is a 72 y.o., male.    Anesthesia Evaluation    I have reviewed the Patient Summary Reports.    I have reviewed the Nursing Notes.      Review of Systems  Anesthesia Hx:  No problems with previous Anesthesia    Cardiovascular:   Hypertension, well controlled CAD asymptomatic CABG/stent     Pulmonary:   Pneumonia COPD, mild    Hepatic/GI:   GERD, well controlled    Endocrine:   Diabetes, well controlled, type 2        Physical Exam  General:  Morbid Obesity                 Anesthesia Plan  Type of Anesthesia, risks & benefits discussed:  Anesthesia Type:  general  Patient's Preference:   Intra-op Monitoring Plan:   Intra-op Monitoring Plan Comments:   Post Op Pain Control Plan:   Post Op Pain Control Plan Comments:   Induction:   IV  Beta Blocker:  Patient is not currently on a Beta-Blocker (No further documentation required).       Informed Consent: Patient understands risks and agrees with Anesthesia plan.  Questions answered. Anesthesia consent signed with patient.  ASA Score: 3     Day of Surgery Review of History & Physical:    H&P update referred to the surgeon.         Ready For Surgery From Anesthesia Perspective.

## 2018-05-30 VITALS
DIASTOLIC BLOOD PRESSURE: 65 MMHG | SYSTOLIC BLOOD PRESSURE: 146 MMHG | RESPIRATION RATE: 16 BRPM | OXYGEN SATURATION: 95 % | HEART RATE: 68 BPM | WEIGHT: 290.81 LBS | TEMPERATURE: 99 F | HEIGHT: 72 IN | BODY MASS INDEX: 39.39 KG/M2

## 2018-05-30 LAB
BACTERIA BLD CULT: NORMAL
BASOPHILS # BLD AUTO: 0 K/UL
BASOPHILS NFR BLD: 0.2 %
DIFFERENTIAL METHOD: ABNORMAL
EOSINOPHIL # BLD AUTO: 0.1 K/UL
EOSINOPHIL NFR BLD: 0.9 %
ERYTHROCYTE [DISTWIDTH] IN BLOOD BY AUTOMATED COUNT: 15.1 %
HCT VFR BLD AUTO: 26 %
HGB BLD-MCNC: 8.7 G/DL
LYMPHOCYTES # BLD AUTO: 1 K/UL
LYMPHOCYTES NFR BLD: 13.1 %
MCH RBC QN AUTO: 33.2 PG
MCHC RBC AUTO-ENTMCNC: 33.4 G/DL
MCV RBC AUTO: 100 FL
MONOCYTES # BLD AUTO: 0.5 K/UL
MONOCYTES NFR BLD: 6.8 %
NEUTROPHILS # BLD AUTO: 5.8 K/UL
NEUTROPHILS NFR BLD: 79 %
PLATELET # BLD AUTO: 123 K/UL
PMV BLD AUTO: 10.6 FL
POCT GLUCOSE: 149 MG/DL (ref 70–110)
RBC # BLD AUTO: 2.61 M/UL
WBC # BLD AUTO: 7.4 K/UL

## 2018-05-30 PROCEDURE — 25000003 PHARM REV CODE 250: Performed by: INTERNAL MEDICINE

## 2018-05-30 PROCEDURE — 36415 COLL VENOUS BLD VENIPUNCTURE: CPT

## 2018-05-30 PROCEDURE — S4991 NICOTINE PATCH NONLEGEND: HCPCS | Performed by: NURSE PRACTITIONER

## 2018-05-30 PROCEDURE — 87040 BLOOD CULTURE FOR BACTERIA: CPT

## 2018-05-30 PROCEDURE — 25000003 PHARM REV CODE 250: Performed by: NURSE PRACTITIONER

## 2018-05-30 PROCEDURE — 87077 CULTURE AEROBIC IDENTIFY: CPT

## 2018-05-30 PROCEDURE — 63600175 PHARM REV CODE 636 W HCPCS: Performed by: INTERNAL MEDICINE

## 2018-05-30 PROCEDURE — 87186 SC STD MICRODIL/AGAR DIL: CPT

## 2018-05-30 PROCEDURE — A4216 STERILE WATER/SALINE, 10 ML: HCPCS | Performed by: INTERNAL MEDICINE

## 2018-05-30 PROCEDURE — 25000003 PHARM REV CODE 250: Performed by: EMERGENCY MEDICINE

## 2018-05-30 PROCEDURE — 27000221 HC OXYGEN, UP TO 24 HOURS

## 2018-05-30 PROCEDURE — 97116 GAIT TRAINING THERAPY: CPT

## 2018-05-30 PROCEDURE — 99239 HOSP IP/OBS DSCHRG MGMT >30: CPT | Mod: ,,, | Performed by: INTERNAL MEDICINE

## 2018-05-30 PROCEDURE — 94761 N-INVAS EAR/PLS OXIMETRY MLT: CPT

## 2018-05-30 PROCEDURE — 25000242 PHARM REV CODE 250 ALT 637 W/ HCPCS: Performed by: NURSE PRACTITIONER

## 2018-05-30 PROCEDURE — 94640 AIRWAY INHALATION TREATMENT: CPT

## 2018-05-30 PROCEDURE — 85025 COMPLETE CBC W/AUTO DIFF WBC: CPT

## 2018-05-30 RX ORDER — LANOLIN ALCOHOL/MO/W.PET/CERES
100 CREAM (GRAM) TOPICAL DAILY
Qty: 30 TABLET | Refills: 0 | Status: SHIPPED | OUTPATIENT
Start: 2018-05-31 | End: 2019-01-23

## 2018-05-30 RX ORDER — POLYETHYLENE GLYCOL 3350 17 G/17G
17 POWDER, FOR SOLUTION ORAL DAILY PRN
Refills: 0
Start: 2018-05-30 | End: 2019-01-23

## 2018-05-30 RX ORDER — ATORVASTATIN CALCIUM 40 MG/1
40 TABLET, FILM COATED ORAL DAILY
Start: 2018-06-13

## 2018-05-30 RX ORDER — FUROSEMIDE 20 MG/1
20 TABLET ORAL DAILY
Qty: 30 TABLET | Refills: 0 | Status: SHIPPED | OUTPATIENT
Start: 2018-05-30 | End: 2019-01-23

## 2018-05-30 RX ORDER — IPRATROPIUM BROMIDE AND ALBUTEROL SULFATE 2.5; .5 MG/3ML; MG/3ML
3 SOLUTION RESPIRATORY (INHALATION) EVERY 6 HOURS PRN
Qty: 1 BOX | Refills: 0 | Status: SHIPPED | OUTPATIENT
Start: 2018-05-30 | End: 2019-01-23

## 2018-05-30 RX ORDER — CEFAZOLIN SODIUM 2 G/50ML
2000 SOLUTION INTRAVENOUS EVERY 8 HOURS
Qty: 4500 ML | Refills: 1
Start: 2018-05-30 | End: 2018-07-11

## 2018-05-30 RX ORDER — ZINC OXIDE 20 G/100G
OINTMENT TOPICAL 2 TIMES DAILY
Qty: 28 G | Refills: 0 | Status: SHIPPED | OUTPATIENT
Start: 2018-05-30 | End: 2019-01-23

## 2018-05-30 RX ADMIN — ASPIRIN 325 MG ORAL TABLET 325 MG: 325 PILL ORAL at 10:05

## 2018-05-30 RX ADMIN — BENAZEPRIL HYDROCHLORIDE 40 MG: 10 TABLET, FILM COATED ORAL at 10:05

## 2018-05-30 RX ADMIN — NICOTINE 1 PATCH: 14 PATCH, EXTENDED RELEASE TRANSDERMAL at 10:05

## 2018-05-30 RX ADMIN — IPRATROPIUM BROMIDE AND ALBUTEROL SULFATE 3 ML: .5; 3 SOLUTION RESPIRATORY (INHALATION) at 03:05

## 2018-05-30 RX ADMIN — METOPROLOL SUCCINATE 25 MG: 25 TABLET, EXTENDED RELEASE ORAL at 10:05

## 2018-05-30 RX ADMIN — IPRATROPIUM BROMIDE AND ALBUTEROL SULFATE 3 ML: .5; 3 SOLUTION RESPIRATORY (INHALATION) at 07:05

## 2018-05-30 RX ADMIN — AMIODARONE HYDROCHLORIDE 200 MG: 200 TABLET ORAL at 10:05

## 2018-05-30 RX ADMIN — TAMSULOSIN HYDROCHLORIDE 0.4 MG: 0.4 CAPSULE ORAL at 10:05

## 2018-05-30 RX ADMIN — Medication 1 TABLET: at 10:05

## 2018-05-30 RX ADMIN — IPRATROPIUM BROMIDE AND ALBUTEROL SULFATE 3 ML: .5; 3 SOLUTION RESPIRATORY (INHALATION) at 11:05

## 2018-05-30 RX ADMIN — SODIUM CHLORIDE, PRESERVATIVE FREE 10 ML: 5 INJECTION INTRAVENOUS at 10:05

## 2018-05-30 RX ADMIN — ACETAMINOPHEN 650 MG: 325 TABLET, FILM COATED ORAL at 06:05

## 2018-05-30 RX ADMIN — ZINC OXIDE: 200 OINTMENT TOPICAL at 12:05

## 2018-05-30 RX ADMIN — NYSTATIN 500000 UNITS: 100000 SUSPENSION ORAL at 10:05

## 2018-05-30 RX ADMIN — CEFAZOLIN SODIUM 2 G: 2 SOLUTION INTRAVENOUS at 06:05

## 2018-05-30 RX ADMIN — NYSTATIN 500000 UNITS: 100000 SUSPENSION ORAL at 02:05

## 2018-05-30 RX ADMIN — SODIUM CHLORIDE, PRESERVATIVE FREE 10 ML: 5 INJECTION INTRAVENOUS at 02:05

## 2018-05-30 RX ADMIN — ZINC OXIDE: 200 OINTMENT TOPICAL at 10:05

## 2018-05-30 RX ADMIN — THIAMINE HCL TAB 100 MG 100 MG: 100 TAB at 10:05

## 2018-05-30 RX ADMIN — SODIUM CHLORIDE, PRESERVATIVE FREE 10 ML: 5 INJECTION INTRAVENOUS at 12:05

## 2018-05-30 RX ADMIN — DAPTOMYCIN 1000 MG: 500 INJECTION, POWDER, LYOPHILIZED, FOR SOLUTION INTRAVENOUS at 11:05

## 2018-05-30 RX ADMIN — AMLODIPINE BESYLATE 10 MG: 5 TABLET ORAL at 10:05

## 2018-05-30 RX ADMIN — FUROSEMIDE 40 MG: 10 INJECTION, SOLUTION INTRAVENOUS at 10:05

## 2018-05-30 RX ADMIN — PANTOPRAZOLE SODIUM 40 MG: 40 TABLET, DELAYED RELEASE ORAL at 10:05

## 2018-05-30 RX ADMIN — ACETAMINOPHEN 650 MG: 325 TABLET, FILM COATED ORAL at 03:05

## 2018-05-30 RX ADMIN — CEFAZOLIN SODIUM 2 G: 2 SOLUTION INTRAVENOUS at 02:05

## 2018-05-30 RX ADMIN — LACTOBACILLUS ACIDOPHILUS / LACTOBACILLUS BULGARICUS 1 EACH: 100 MILLION CFU STRENGTH GRANULES at 10:05

## 2018-05-30 NOTE — PLAN OF CARE
Problem: Patient Care Overview  Goal: Plan of Care Review  Outcome: Ongoing (interventions implemented as appropriate)  Plan of care reviewed with patient he verbalized understanding alert and oriented able to make his needs known. Pt completed EDUAR medications given after his return. Cont on IV abt no ADR noted. Medicated with prn provided relief call light within reach will cont to monitor.

## 2018-05-30 NOTE — DISCHARGE SUMMARY
"Discharge Summary  Hospital Medicine    Admit Date: 5/17/2018    Date and Time: 5/30/20182:33 PM    Discharge Attending Physician: Erik Granados MD    Primary Care Physician: Elijah Thornton MD    Diagnoses:  Active Hospital Problems    Diagnosis  POA    *Acute hypoxemic respiratory failure [J96.01]  Yes    Obstructive sleep apnea [G47.33]  Yes    COPD with acute lower respiratory infection [J44.0]  Yes    Alcohol use [Z78.9]  Yes    Tobacco abuse [Z72.0]  Yes    ZACHARY (acute kidney injury) [N17.9]  Yes    Community acquired pneumonia of left lower lobe of lung [J18.1]  Staph Aureus Pneumonia, left lower lobe  Staph Aureus sepsis  UTI  Tobacco abuse  Alcohol abuse  Yes    Severe sepsis [A41.9, R65.20]  Yes    Paroxysmal atrial fibrillation [I48.0]  Yes    CAD (coronary artery disease) [I25.10]  Yes    HTN (hypertension) [I10]  Yes    Type 2 diabetes mellitus with hyperglycemia [E11.65]  Yes        Discharged Condition: Good    Hospital Course:   Jun Stuart is a 72 y.o. Male with PMHx significant for afib, CAD, GERD, HTN, DM and HLD.  He was admitted to the service of hospital medicine with severe sepsis and acute hypoxemic respiratory failure. He presented to the ED via EMS for further evaluation of confusion.  His wife stated, the patient awoke, he began complaining of upper back pain. He was brought to [facility], where the wife stated that patient had an "x-ray and EKG." She stated that the patient was then given pain medication and discharged. The patient took the pain medication and then drank alcohol.  He became nauseous and had 1 episode of emesis.  Wife stated he went to bed and when he tried to get up to go to the bathroom, he couldn't, so she called EMS.  She reported that he had been confused after the pain medication and alcohol.  She stated that he had chills while at [facility], but did not take his temperature.  She endorsed the patient has had and increase in " cough that is nonproductive. EMS stated that the patient had an CBG of 193 and was 91% on RA on arrival. Patient was admitted to Hospitalist medicine service. Patient was managed for pneumonia, Staph Aureus sepsis and respiratory failure. Patient was evaluated by Dr. Armijo, Dr. Walker and Dr. SALVATORE Montes. No obvious source of sepsis could be identified. Patient was able to wean off supplemental oxygen. He was managed in ICU for DTs and respiratory and use BiPAP therapy. Home IV antibiotics arranged. Patient is cautioned not to drink alcohol or smoke cigarettes. Smoking cessation counseling performed. Dangers of cigarette smoking were reviewed with patient in detail and patient was encouraged to quit. Nicotine replacement options were discussed for > 3 minutes. Home health and home PT is arranged. Patient was discharged home in stable condition with following discharge plan of care. Total time with the patient was 30 minutes and greater than 50% was spent in counseling and coordination of care. The assessment and plan have been discussed at length. Physicians' notes reviewed. Labs and procedure reviewed.     Consults: Dr. Armijo, Dr. SALVATORE Montes, Dr. Walker    Significant Diagnostic Studies:     Microbiology Results (last 7 days)     Procedure Component Value Units Date/Time    Blood culture [[id number]] Collected:  05/29/18 0452    Order Status:  Completed Specimen:  Blood Updated:  05/30/18 1212     Blood Culture, Routine No Growth to date     Blood Culture, Routine No Growth to date    Blood culture [[id number]] Collected:  05/30/18 0416    Order Status:  Sent Specimen:  Blood Updated:  05/30/18 1037    Blood culture [[id number]]  (Susceptibility) Collected:  05/25/18 1936    Order Status:  Completed Specimen:  Blood from Line, PICC Right  Neck Updated:  05/30/18 0938     Blood Culture, Routine Gram stain aer bottle: Gram positive cocci in clusters resembling Staph      Blood Culture, Routine Results called to and read  back by: Janette Bray LPN 05/27/2018  08:15     Blood Culture, Routine --     STAPHYLOCOCCUS AUREUS  ID consult required at [facility] and [facility] locations.      Narrative:       Draw from picc    Blood culture [[id number]] Collected:  05/28/18 0419    Order Status:  Completed Specimen:  Blood from Line, PICC Right  Arm Updated:  05/29/18 1612     Blood Culture, Routine No Growth to date     Blood Culture, Routine No Growth to date    Blood culture [[id number]] Collected:  05/24/18 0549    Order Status:  Completed Specimen:  Blood Updated:  05/29/18 1212     Blood Culture, Routine No growth after 5 days.    Blood culture [[id number]] Collected:  05/23/18 0635    Order Status:  Completed Specimen:  Blood from Antecubital, Left Updated:  05/28/18 1212     Blood Culture, Routine No growth after 5 days.    Urine culture [[id number]] Collected:  05/26/18 0155    Order Status:  Completed Specimen:  Urine from Urine, Clean Catch Updated:  05/27/18 1356     Urine Culture, Routine No growth    Culture, Respiratory with Gram Stain [[id number]] Collected:  05/26/18 0430    Order Status:  Canceled Specimen:  Respiratory from Sputum, Expectorated Updated:  05/26/18 1318    Blood culture [[id number]]  (Susceptibility) Collected:  05/22/18 0453    Order Status:  Completed Specimen:  Blood from Antecubital, Right  Arm Updated:  05/25/18 1042     Blood Culture, Routine Gram stain aer bottle: Gram positive cocci in clusters resembling Staph      Blood Culture, Routine Results called to and read back by:Jigna Rios RN 05/23/2018  11:31     Blood Culture, Routine --     STAPHYLOCOCCUS AUREUS  ID consult required at [facility] and [facility] locations.          Special Treatments/Procedures: None  Disposition: Home or Self Care    Medications:  Reconciled Home Medications: Current Discharge Medication List      START taking these medications    Details   albuterol-ipratropium (DUO-NEB) 2.5 mg-0.5 mg/3 mL nebulizer solution Take 3  mLs by nebulization every 6 (six) hours as needed for Wheezing. Rescue  Qty: 1 Box, Refills: 0      ceFAZolin 2 g/50mL Dextrose IVPB (ANCEF) 2 gram/50 mL PgBk Inject 50 mLs (2,000 mg total) into the vein every 8 (eight) hours.  Qty: 4500 mL, Refills: 1      folic acid-vit B6-vit B12 2.5-25-2 mg (FOLBIC OR EQUIV) 2.5-25-2 mg Tab Take 1 tablet by mouth once daily.  Qty: 30 tablet, Refills: 0      !! furosemide (LASIX) 20 MG tablet Take 1 tablet (20 mg total) by mouth once daily.  Qty: 30 tablet, Refills: 0      polyethylene glycol (GLYCOLAX) 17 gram PwPk Take 17 g by mouth daily as needed (constipation).  Refills: 0      sodium chloride 0.9% SolP 50 mL with DAPTOmycin 500 mg SolR 1,000 mg Inject 1,000 mg into the vein once daily.      thiamine 100 MG tablet Take 1 tablet (100 mg total) by mouth once daily.  Qty: 30 tablet, Refills: 0      zinc oxide 20 % ointment Apply topically 2 (two) times daily. Apply to bilateral groin  Qty: 28 g, Refills: 0       !! - Potential duplicate medications found. Please discuss with provider.      CONTINUE these medications which have CHANGED    Details   atorvastatin (LIPITOR) 40 MG tablet Take 1 tablet (40 mg total) by mouth once daily.         CONTINUE these medications which have NOT CHANGED    Details   amiodarone (PACERONE) 200 MG Tab Take 200 mg by mouth once daily.       amlodipine (NORVASC) 10 MG tablet Take 10 mg by mouth once daily.       aspirin 325 MG tablet Take 162 mg by mouth once daily. 1/2 aspirin daily      benazepril (LOTENSIN) 40 MG tablet Take 40 mg by mouth once daily.       !! furosemide (LASIX) 40 MG tablet Take 40 mg by mouth once daily.      gabapentin (NEURONTIN) 300 MG capsule Take 300 mg by mouth 3 (three) times daily.      metFORMIN (GLUCOPHAGE-XR) 500 MG 24 hr tablet Take 1,000 mg by mouth 2 (two) times daily with meals.       metoprolol succinate (TOPROL-XL) 25 MG 24 hr tablet Take 25 mg by mouth once daily.      montelukast (SINGULAIR) 10 mg tablet  "Take 10 mg by mouth every evening.      tamsulosin (FLOMAX) 0.4 mg Cp24 Take 0.4 mg by mouth once daily.        !! - Potential duplicate medications found. Please discuss with provider.          Discharge Procedure Orders  WALKER FOR HOME USE   Order Specific Question Answer Comments   Type of Walker: Adult (5'4"-6'6")    With wheels? Yes 2 front    Height: 6' (1.829 m)    Weight: 131.9 kg (290 lb 12.6 oz)    Length of need (1-99 months): 11    Does patient have medical equipment at home? CPAP    Does patient have medical equipment at home? walker, rolling    Please check all that apply: Patient's condition impairs ambulation.      NEBULIZER FOR HOME USE   Order Specific Question Answer Comments   Height: 6' (1.829 m)    Weight: 131.9 kg (290 lb 12.6 oz)    Does patient have medical equipment at home? CPAP    Does patient have medical equipment at home? walker, rolling    Length of need (1-99 months): 11      Ambulatory referral to Home Health   Referral Priority: Routine Referral Type: Home Health   Referral Reason: Specialty Services Required    Requested Specialty: Home Health Services    Number of Visits Requested: 1      Ambulatory consult to Sleep Disorders   Referral Priority: Routine Referral Type: Consultation   Requested Specialty: Sleep Medicine    Number of Visits Requested: 1      Diet Cardiac   Scheduling Instructions: Patient to monitor daily weight, follow low salt diet and in case if gain more than 3 pounds in 24 hours, please call your doctor for further advice.     Activity as tolerated   Order Comments: Observe fall precautions.     Call MD for:   Order Comments: For worsening symptoms, chest pain, shortness of breath, increased abdominal pain, high grade fever, stroke or stroke like symptoms, immediately go to the nearest Emergency Room or call 911 as soon as possible.       Follow-up Information     [facility].    Specialties:  Physical Therapy, Home Health Services  Why:  " Home Health  Contact information:  [address]  [address]  [place] [address]  [phone]             [facility].    Why:  Infusion  Contact information:  [address]    [place] [address]   Phone: [phone]    Fax: [phone]    Toll Free #: [phone]    M-F 8:30AM-5:00PM CST           Elijah Thornton MD On 6/6/2018.    Specialty:  Family Medicine  Why:  @1:30pm   Contact information:  [address]  [facility] [address]  [place] [address]  [phone]             Radha Armijo MD On 6/11/2018.    Specialty:  Infectious Diseases  Why:  @2:00pm   Contact information:  [address] [facility]  [address]  [place] [address]  [phone]             Babita Montes MD In 2 weeks.    Specialty:  Cardiology  Contact information:  [address]  [address]  [place] [address]  [phone]             Please follow up.    Contact information:  Please do not smoke cigarettes or drink alcohol.               Please follow up.    Contact information:  Have Sleep study completed and nightly use CPAP.  weight loss plan.  Closely monitor blood glucose level           Segundo Walker MD In 2 weeks.    Specialty:  Pulmonary Disease  Contact information:  [address] [facility]  [address]  [address]  [place] [address]  [phone]

## 2018-05-30 NOTE — PROGRESS NOTES
05/30/18 1010   Home Oxygen Qualification   Room Air SpO2 At Rest 97 %   Room Air SpO2 on Exertion 93 %   SpO2 on Recovery 97 %   Recovery Heart Rate 77 bpm   Home O2 Eval Comments pt does not qualify for home O2

## 2018-05-30 NOTE — PLAN OF CARE
05/29/18 1959   Patient Assessment/Suction   Level of Consciousness (AVPU) alert   Respiratory Effort Normal;Unlabored   Expansion/Accessory Muscles/Retractions expansion symmetric;no retractions;no use of accessory muscles   All Lung Fields Breath Sounds diminished   Cough Type nonproductive;loose   PRE-TX-O2-ETCO2   O2 Device (Oxygen Therapy) nasal cannula   Flow (L/min) 2   Oxygen Concentration (%) 28   SpO2 98 %   Pulse Oximetry Type Continuous   Pulse 69   Resp 20   Aerosol Therapy   $ Aerosol Therapy Charges Aerosol Treatment   Respiratory Treatment Status given   SVN/Inhaler Treatment Route mask;with oxygen   Position During Treatment Sitting in chair   Patient Tolerance good   Post-Treatment   Post-treatment Heart Rate (beats/min) 70   Post-treatment Resp Rate (breaths/min) 18   All Fields Breath Sounds unchanged   Ready to Wean/Extubation Screen   FIO2<=50 (chart decimal) 0.28   Preset CPAP/BiPAP Settings   Mode Of Delivery Standby

## 2018-05-30 NOTE — PLAN OF CARE
Spoke with Dr Walker, who cleared him for discharge....Aaron Scherer       05/30/18 7573   Discharge Reassessment   Assessment Type Discharge Planning Reassessment

## 2018-05-30 NOTE — PLAN OF CARE
Discharge planner  sent home health referral to [facility] via [facility], pending approval.    Discharge planner met with patient at the bedside Re: dme -rolling walker. Patient decline the need for a new walker, as he already own a walker post-knee surgery.      [facility] admitted at 5/17/2018 6:54 PM has been updated by [email or web address].  Update: [facility]

## 2018-05-30 NOTE — PROGRESS NOTES
05/30/18 0703   Patient Assessment/Suction   Level of Consciousness (AVPU) alert   All Lung Fields Breath Sounds diminished   Cough Frequency infrequent   Cough Type good;nonproductive   PRE-TX-O2-ETCO2   O2 Device (Oxygen Therapy) nasal cannula   $ Is the patient on Low Flow Oxygen? Yes   Flow (L/min) 2   SpO2 99 %   Pulse Oximetry Type Continuous   $ Pulse Oximetry - Multiple Charge Pulse Oximetry - Multiple   Pulse 69   Resp 16   Aerosol Therapy   $ Aerosol Therapy Charges Aerosol Treatment   Respiratory Treatment Status given   SVN/Inhaler Treatment Route mask   Position During Treatment Sitting in chair   Patient Tolerance good   Post-Treatment   Post-treatment Heart Rate (beats/min) 66   Post-treatment Resp Rate (breaths/min) 16   All Fields Breath Sounds unchanged   Preset CPAP/BiPAP Settings   Mode Of Delivery Standby

## 2018-05-30 NOTE — PLAN OF CARE
I spoke with the pt's wife, Isaura regarding the pt's discharge and needs. The pt is going to discharge with home IV abx, home health, RW and possible home O2, pending eval.   She is hoping the pt is discharged today and is agreeable to all above listed services with no preference for providers.   I sent the referral to [facility] for home IV abx needs.  Home O2 will be ordered if determined to be needed once eval is complete.  HEMAL Hernandez made aware of pt's discharge needs also....TRINI Scherer       05/30/18 0629   Discharge Reassessment   Assessment Type Discharge Planning Reassessment

## 2018-05-30 NOTE — PLAN OF CARE
11:15am- Spoke with Dixie at [facility] [employer], she has spoke with the pt's wife, Isaura regarding the cost of the home IV abx and she agreed they will pay the pt's portion.   12:42- Dixie states they have sent a financial agreement to [facility], once they receive the ok she will be at the hospital to do teaching with the pt and his wife.   1300- I received call from the pt's nurse that the pt's wife has picked a home health agency. I called pt's wife, Isaura to discuss. I explained that the referral had been sent to [facility] and that Matt was already working with them on a financial agreement. She stated it was fine to leave with [facility], she does not want to cause any delays. She acknowledges telling me this morning they did not have a preference but then received a call from a family member stating that another family member worked for a certain agency. I told her it was up to them if they wanted to change agencies we would just have to notify the infusion company, she again states it was fine to leave services with [facility].   1315- Received call from Dixie with Matt stating that everything was arranged with [facility] and she was coming to the hospital to do teaching with the pt and his wife.....TRINI Scherer       05/30/18 1333   Discharge Reassessment   Assessment Type Discharge Planning Reassessment

## 2018-05-30 NOTE — PLAN OF CARE
Spoke with Dr Montes regarding discharge; he states the pt is clear from his standpoint to discharge and wants him on Lasix 20mg po daily. The pt can follow up with him in clinic in a few weeks.   Dr Granados notified of the above...Aaron Scherer       05/30/18 8300   Discharge Reassessment   Assessment Type Discharge Planning Reassessment

## 2018-05-30 NOTE — NURSING
Discharge instructions given to patient he verbalized understanding. Pt to go home with picc In RUE. All questions and concerns answered. Prescriptions given and pt teaching reiterated. Cardiac monitor removed. Pt left with all belongings via wheelchair at 4:20 relinquished care.

## 2018-05-30 NOTE — PT/OT/SLP PROGRESS
"Physical Therapy Treatment    Patient Name:  Jun Stuart   MRN:  [id number]    Recommendations:     Discharge Recommendations:   (OP pulm rehab)       Assessment:     Jun Stuart is a 72 y.o. male admitted with a medical diagnosis of Acute hypoxemic respiratory failure.  He presents with the following impairments/functional limitations:  weakness, impaired endurance, impaired functional mobilty, gait instability, decreased safety awareness .    Rehab Prognosis:  good; patient would benefit from acute skilled PT services to address these deficits and reach maximum level of function.      Recent Surgery: Procedure(s) (LRB):  TRANSESOPHAGEAL ECHOCARDIOGRAM WITH POSSIBLE CARDIOVERSION (EDUAR W/ POSS CARDIOVERSION) (N/A) 1 Day Post-Op    Plan:     During this hospitalization, patient to be seen 6 x/week to address the above listed problems via gait training, therapeutic activities, therapeutic exercises  · Plan of Care Expires:  06/08/18   Plan of Care Reviewed with: patient    Subjective     Communicated with nurse Cecilia and Tejal SANDHU prior to session.  Patient found seated in chair upon PT entry to room, agreeable to treatment.      Chief Complaint: none expressed  Patient comments/goals: pt agreeable to gait and home O2 eval. Pt stated, "I'm part  so I like to have my women walk behind me."  Pain/Comfort:  · Pain Rating 1: 0/10    Patients cultural, spiritual, Christian conflicts given the current situation: none    Objective:     Patient found with: peripheral IV, telemetry, pulse ox (continuous), oxygen     General Precautions: Standard, fall, respiratory   Orthopedic Precautions:N/A   Braces: N/A     Functional Mobility:  · Transfers:     · Sit to Stand:  contact guard assistance with rolling walker    · Gait: 250' with CGA-SBA using RW; on RA as repiratory therapist present for home O2 eval. No LOB or SOB noted        Patient left up in chair with all lines intact, call button in reach and nurse " notified..    GOALS:    Physical Therapy Goals        Problem: Physical Therapy Goal    Goal Priority Disciplines Outcome Goal Variances Interventions   Physical Therapy Goal     PT/OT, PT Ongoing (interventions implemented as appropriate)     Description:  Goals to be met by: 2018     Patient will increase functional independence with mobility by performin. Sit to stand transfer with Contact Guard Assistance  2. Bed to chair transfer with Contact Guard Assistance using Rolling Walker  3. Gait  x 250x2 feet with Contact Guard Assistance using Rolling Walker.   4. Lower extremity exercise program x20 reps per handout, with assistance as needed                      Time Tracking:     PT Received On: 18  PT Start Time: 1010     PT Stop Time: 1023  PT Total Time (min): 13 min     Billable Minutes: Gait Training 13    Treatment Type: Treatment  PT/PTA: PTA     PTA Visit Number: 3     Sana Negrete PTA  2018

## 2018-05-31 ENCOUNTER — PATIENT OUTREACH (OUTPATIENT)
Dept: ADMINISTRATIVE | Facility: CLINIC | Age: 73
End: 2018-05-31

## 2018-05-31 NOTE — PLAN OF CARE
05/31/18 0801   Final Note   Assessment Type Final Discharge Note   Discharge Disposition Home-Health

## 2018-06-01 NOTE — PROVIDER PROGRESS NOTES - EMERGENCY DEPT.
Encounter Date: 5/17/2018    ED Physician Progress Notes           2:38 AM - call received from laboratory regarding positive blood cultures, gram-positive cocci in clusters resembling Staph.  Per previous blood cultures positive with Staph, it is sensitive to penicillin.  Patient is already on cefazolin as outpatient which should cover her infection.

## 2018-06-02 LAB — BACTERIA BLD CULT: NORMAL

## 2018-06-03 LAB
BACTERIA BLD CULT: NORMAL

## 2018-06-04 ENCOUNTER — TELEPHONE (OUTPATIENT)
Dept: PULMONOLOGY | Facility: CLINIC | Age: 73
End: 2018-06-04

## 2018-06-04 NOTE — TELEPHONE ENCOUNTER
Scheduled appt for pt     ----- Message from Maria Elena Mi sent at 6/4/2018 11:46 AM CDT -----  Contact: PTs Wife  Wife calling to make the 2 week hos f/u appointment with Dr. Walker for PT    Callback: [phone]

## 2018-06-04 NOTE — TELEPHONE ENCOUNTER
Scheduled appt with patient   ----- Message from Angelia Villasenor sent at 6/1/2018  4:17 PM CDT -----  Contact: [phone]  Patient is requesting a call back from the nurse stated he need a 2 wk hospital follow up, pneumonia.  No appts available in that time frame.   Please call the patient upon request at phone number [phone].

## 2018-06-08 LAB — BSA FOR ECHO PROCEDURE: 2.61 M2

## 2018-06-13 NOTE — PROGRESS NOTES
6/14/2018    Jun Stuart  New Patient Consult    Chief Complaint   Patient presents with    hospital f/u    Sputum Production     thick and white    Cough    Shortness of Breath       HPI: had 13 days in NSR with staph Bacteremia- no endocarditis nor apparent osteo,     Smoked a bit after dc. Has been off drinking.  Walks at home.      Had cough started after dc 2wks ago,  and left chest pain mild but worsened today-- pleuritic pain , cough productive of white mucous, appetite poor.  Walks with therapy and getting better.  Has osas with no cpap used - pt had 3 studies.  No fever or sweats.  Having lower back pain due for mri June 25 th.    Pt on lasix 40 bid and leg edema has lessee post dc 2wks ago.  Poor appetite.  No fever or sweats.  Sugars up to 200 or so.  Blood monitored by Dr Sorensen.  No cpap /bipap.   Still on cefazolin and needs to use another 4 wks.         MRN: [id number]  Date: 05/19/2018     Admit Date: 5/17/2018                      Consult Requested By: Erik Granados MD     Reason for Consult: Respiratory failure, possible pneumonia, possible DT     HPI:     5/19/2018 - 73 yo male came to ER with confusion, now admitted with pneumonia, possible sepsis.  By report he had episode of nausea and vomiting at home (possibility of aspiration).  During the night he became agitated and CXR showed increased bilateral infiltrates and had some response to lasix.  He required BiPAP and is more stable.  Also placed on precedex and is currently sedate.  He is not able to provide any history.       subjective  May 22, alert, uses hospital bipap but dislikes home units in past.  No home o2, usually good function.  Having pleuritic chest pain rather   May 23, alert, on 5 lpm sat 94%, confused last pm.  Uses niv.  May 24, still lingering left anterior chest pain (was more posterior),  Had lasix response wrt increase uo.  Used bipap last pm , still on 5lpm  May 28, gas exchange improved as only on 2-3 lpm.  Still  "leg edema, uses niv for sleep. Less delirium.   Plan:    May 22, will f/u cxr.  Should clear resp failure with rx staph.  Copd  And osas and cor pulmonale may play role?  Echo suggest mitral dz/left heart problem?   May 23,  cxr sluggish, still left infiltrate, bnp 361-will give 20 lasix ivp x1 .  Adjust niv.     May 24, will dose lasix 20 again as still edema and labs ok.  Use niv for sleep and f/u cxr.  Seems sl better - delirium makes eval difficult?  mycostatin   May 28- looks like improving lung wise, cxr sl better and gas exchange better- should be good for EDUAR.    Patient name: Jun Stuart        The chief compliant  problem is new to me",   PFSH:  Past Medical History:   Diagnosis Date    Atrial fibrillation     Colon polyp     Coronary artery disease     Diabetes mellitus     GERD (gastroesophageal reflux disease)     Hyperlipidemia     Hypertension          Past Surgical History:   Procedure Laterality Date    cardioversion  2012    JOINT REPLACEMENT      VASECTOMY       Social History   Substance Use Topics    Smoking status: Light Tobacco Smoker     Packs/day: 1.00     Years: 50.00     Types: Cigarettes    Smokeless tobacco: Never Used      Comment: about 3 cigs aday    Alcohol use Yes     Family History   Problem Relation Age of Onset    Urolithiasis Neg Hx     Sickle cell trait Neg Hx     Kidney cancer Neg Hx      Review of patient's allergies indicates:  No Known Allergies    Performance Status:The patient's activity level is housebound activities.      Review of Systems:  a review of eleven systems covering constitutional, Eye, HEENT, Psych, Respiratory, Cardiac, GI, , Musculoskeletal, Endocrine, Dermatologic was negative except for pertinent findings as listed ABOVE and below:      Exam:Comprehensive exam done. /65 (BP Location: Left arm, Patient Position: Sitting)   Pulse 64   Ht 6' (1.829 m)   Wt 123.4 kg (272 lb)   SpO2 96%   BMI 36.89 kg/m²   Exam included Vitals " as listed, and patient's appearance and affect and alertness and mood, oral exam for yeast and hygiene and pharynx lesions and Mallapatti (M) score, neck with inspection for jvd and masses and thyroid abnormalities and lymph nodes (supraclavicular and infraclavicular nodes and axillary also examined and noted if abn), chest exam included symmetry and effort and fremitus and percussion and auscultation, cardiac exam included rhythm and gallops and murmur and rubs and jvd and edema, abdominal exam for mass and hepatosplenomegaly and tenderness and hernias and bowel sounds, Musculoskeletal exam with muscle tone and posture and mobility/gait and  strength, and skin for rashes and cyanosis and pallor and turgor, extremity for clubbing.  Findings were normal except for pertinent findings listed below:  M4, obese, chest is symmetric, no distress, normal percussion, normal fremitus and good normal breath sounds  Min rales left lung, no edema    Radiographs (ct chest and cxr) reviewed: view by direct vision  5/17 cxr with min left lung , later films with left >> right  Infiltrate with worsening 5/22 and better 2/29- none since.      Labs reviewed      PFT was not done       Plan:  Clinical impression is apparently straight forward and impression with management as below.     Jun was seen today for hospital f/u, sputum production, cough and shortness of breath.    Diagnoses and all orders for this visit:    COPD with acute bronchitis  -     X-Ray Chest PA And Lateral; Future  -     CTA Chest Non Coronary  -     BASIC METABOLIC PANEL; Future  -     CBC auto differential; Future  -     Culture, Respiratory with Gram Stain; Standing  -     tiotropium-olodaterol (STIOLTO RESPIMAT) 2.5-2.5 mcg/actuation Mist; Inhale 1 puff into the lungs once daily. Controller    Chest pain on breathing  -     X-Ray Chest PA And Lateral; Future  -     CTA Chest Non Coronary  -     BASIC METABOLIC PANEL; Future  -     CBC auto differential;  Future  -     Culture, Respiratory with Gram Stain; Standing  -     HYDROcodone-acetaminophen (NORCO) 5-325 mg per tablet; Take 1 tablet by mouth every 4 (four) hours as needed for Pain.    Type 2 diabetes mellitus with complication, without long-term current use of insulin  -     BASIC METABOLIC PANEL; Future  -     CBC auto differential; Future        Follow-up in about 1 week (around 6/21/2018), or if symptoms worsen or fail to improve.    Discussed with patient above for education the following:      Patient Instructions   Use stiolto 2 puffs daily , use nebulizer    Get ct chest for blood clot now.    Sputum culture    Hydrocodone pills as needed for pain- need to diagnose cause of pain in left chest.  Exam slight abn in left lung.

## 2018-06-14 ENCOUNTER — HOSPITAL ENCOUNTER (OUTPATIENT)
Dept: RADIOLOGY | Facility: HOSPITAL | Age: 73
Discharge: HOME | End: 2018-06-14
Attending: INTERNAL MEDICINE
Payer: MEDICARE

## 2018-06-14 ENCOUNTER — OFFICE VISIT (OUTPATIENT)
Dept: PULMONOLOGY | Facility: CLINIC | Age: 73
End: 2018-06-14
Payer: MEDICARE

## 2018-06-14 VITALS
HEART RATE: 64 BPM | WEIGHT: 272 LBS | OXYGEN SATURATION: 96 % | HEIGHT: 72 IN | SYSTOLIC BLOOD PRESSURE: 137 MMHG | BODY MASS INDEX: 36.84 KG/M2 | DIASTOLIC BLOOD PRESSURE: 65 MMHG

## 2018-06-14 DIAGNOSIS — J44.0 COPD WITH ACUTE BRONCHITIS: Primary | ICD-10-CM

## 2018-06-14 DIAGNOSIS — J20.9 COPD WITH ACUTE BRONCHITIS: ICD-10-CM

## 2018-06-14 DIAGNOSIS — J44.0 COPD WITH ACUTE BRONCHITIS: ICD-10-CM

## 2018-06-14 DIAGNOSIS — E11.8 TYPE 2 DIABETES MELLITUS WITH COMPLICATION, WITHOUT LONG-TERM CURRENT USE OF INSULIN: ICD-10-CM

## 2018-06-14 DIAGNOSIS — J20.9 COPD WITH ACUTE BRONCHITIS: Primary | ICD-10-CM

## 2018-06-14 DIAGNOSIS — R07.1 CHEST PAIN ON BREATHING: ICD-10-CM

## 2018-06-14 PROCEDURE — 71046 X-RAY EXAM CHEST 2 VIEWS: CPT | Mod: 26,,, | Performed by: RADIOLOGY

## 2018-06-14 PROCEDURE — 71046 X-RAY EXAM CHEST 2 VIEWS: CPT | Mod: TC,FY

## 2018-06-14 PROCEDURE — 99999 PR PBB SHADOW E&M-EST. PATIENT-LVL V: CPT | Mod: PBBFAC,,, | Performed by: INTERNAL MEDICINE

## 2018-06-14 PROCEDURE — 99215 OFFICE O/P EST HI 40 MIN: CPT | Mod: PBBFAC,PO | Performed by: INTERNAL MEDICINE

## 2018-06-14 PROCEDURE — 25500020 PHARM REV CODE 255

## 2018-06-14 PROCEDURE — 71275 CT ANGIOGRAPHY CHEST: CPT | Mod: TC

## 2018-06-14 PROCEDURE — 99214 OFFICE O/P EST MOD 30 MIN: CPT | Mod: S$PBB,,, | Performed by: INTERNAL MEDICINE

## 2018-06-14 PROCEDURE — 71275 CT ANGIOGRAPHY CHEST: CPT | Mod: 26,,, | Performed by: RADIOLOGY

## 2018-06-14 RX ORDER — SITAGLIPTIN 50 MG/1
TABLET, FILM COATED ORAL
Refills: 1 | COMMUNITY
Start: 2018-06-11 | End: 2019-01-23

## 2018-06-14 RX ORDER — GUAIFENESIN 600 MG/1
1200 TABLET, EXTENDED RELEASE ORAL DAILY
COMMUNITY
End: 2019-01-23

## 2018-06-14 RX ORDER — SODIUM CHLORIDE 9 MG/ML
INJECTION, SOLUTION INTRAVENOUS
Status: DISCONTINUED
Start: 2018-06-14 | End: 2018-06-15 | Stop reason: HOSPADM

## 2018-06-14 RX ORDER — TRAZODONE HYDROCHLORIDE 100 MG/1
100 TABLET ORAL
COMMUNITY
Start: 2018-06-07 | End: 2019-01-23

## 2018-06-14 RX ORDER — HYDROCODONE BITARTRATE AND ACETAMINOPHEN 5; 325 MG/1; MG/1
1 TABLET ORAL EVERY 4 HOURS PRN
Qty: 20 TABLET | Refills: 0 | Status: SHIPPED | OUTPATIENT
Start: 2018-06-14 | End: 2019-01-23

## 2018-06-14 RX ADMIN — IOHEXOL 100 ML: 350 INJECTION, SOLUTION INTRAVENOUS at 04:06

## 2018-06-14 NOTE — PATIENT INSTRUCTIONS
Use stiolto 2 puffs daily , use nebulizer    Get ct chest for blood clot now.    Sputum culture    Hydrocodone pills as needed for pain- need to diagnose cause of pain in left chest.  Exam slight abn in left lung.

## 2018-06-15 ENCOUNTER — LAB VISIT (OUTPATIENT)
Dept: LAB | Facility: HOSPITAL | Age: 73
End: 2018-06-15
Attending: INTERNAL MEDICINE
Payer: MEDICARE

## 2018-06-15 DIAGNOSIS — R07.1 CHEST PAIN ON BREATHING: ICD-10-CM

## 2018-06-15 DIAGNOSIS — J20.9 COPD WITH ACUTE BRONCHITIS: ICD-10-CM

## 2018-06-15 DIAGNOSIS — J44.0 COPD WITH ACUTE BRONCHITIS: ICD-10-CM

## 2018-06-15 PROCEDURE — 87070 CULTURE OTHR SPECIMN AEROBIC: CPT

## 2018-06-15 PROCEDURE — 87205 SMEAR GRAM STAIN: CPT

## 2018-06-16 ENCOUNTER — PATIENT MESSAGE (OUTPATIENT)
Dept: PULMONOLOGY | Facility: CLINIC | Age: 73
End: 2018-06-16

## 2018-06-16 ENCOUNTER — NURSE TRIAGE (OUTPATIENT)
Dept: ADMINISTRATIVE | Facility: CLINIC | Age: 73
End: 2018-06-16

## 2018-06-16 NOTE — TELEPHONE ENCOUNTER
Reason for Disposition   Caller requesting lab results    Protocols used: ST PCP CALL - NO TRIAGE-A-AH

## 2018-06-16 NOTE — TELEPHONE ENCOUNTER
Wife called to report the following:    -wife would like results of the CT     Can you please advise patient / caregiver about the results of the CT scan?    Education completed per [facility] On Call Care Advice including follow up with provider. Caregiver verbalized understanding.

## 2018-06-17 LAB
BACTERIA SPEC AEROBE CULT: NORMAL
GRAM STN SPEC: NORMAL

## 2018-06-18 ENCOUNTER — TELEPHONE (OUTPATIENT)
Dept: PULMONOLOGY | Facility: CLINIC | Age: 73
End: 2018-06-18

## 2018-06-18 NOTE — TELEPHONE ENCOUNTER
See previous note  ----- Message from Jose Guadalupe Weaver sent at 6/15/2018  2:09 PM CDT -----  Contact: Isaura  Type: Needs Medical Advice    Who Called:  Patient's wife Isaura  Symptoms (pull muscle in chest):    How long has patient had these symptoms:    Pharmacy name and phone #:    Best Call Back Number: [phone]  Additional Information: wanted to know if patient can take two pain pills instead of one? Call back to advise

## 2018-06-18 NOTE — TELEPHONE ENCOUNTER
Called and spoke to Isaura, advised Dr Walker called himself the day resulted, and then I called back on Friday with no answer as well.  Advised pt on Dr Rivers report of exams.  Advised pt he will go over labs in full detail at appt    ----- Message from Sarah Kern sent at 6/18/2018  8:17 AM CDT -----  Contact: Wife Isaura Stuart (  Wife needs to speak to the nurse about test results for patient   Wife states is urgent Dr Walker calls them back     Please call back [phone]

## 2018-06-18 NOTE — TELEPHONE ENCOUNTER
See previous note    ----- Message from Jesus Metcalf sent at 6/16/2018  9:08 AM CDT -----  Contact: self   Patient need to speak with a nurse regarding the results of cat scan and xray. Please call back at [phone] (home)

## 2018-06-19 ENCOUNTER — PATIENT MESSAGE (OUTPATIENT)
Dept: INFECTIOUS DISEASES | Facility: HOSPITAL | Age: 73
End: 2018-06-19

## 2018-08-03 ENCOUNTER — TELEPHONE (OUTPATIENT)
Dept: PULMONOLOGY | Facility: CLINIC | Age: 73
End: 2018-08-03

## 2018-08-03 NOTE — TELEPHONE ENCOUNTER
----- Message from Antonietta Bray sent at 8/3/2018  2:19 PM CDT -----  Patrizia / [phone] / Dr Td Morales  / asking to discuss pt's plan of care / had a ct scan in [place]

## 2018-08-21 ENCOUNTER — TELEPHONE (OUTPATIENT)
Dept: PULMONOLOGY | Facility: CLINIC | Age: 73
End: 2018-08-21

## 2018-08-21 NOTE — TELEPHONE ENCOUNTER
Shannon calling to see if pt needs pulmonologist due to seeing Dr Walker currently.  I called pt to confirm if he wishes to continue seeing Dr Walker, no answer from pt or pts spouse.   ----- Message from Jose Guadalupe Weaver sent at 8/21/2018  8:42 AM CDT -----  Contact: Renetta  Type: Needs Medical Advice    Who Called: Renetta with [facility] pulmonology    Symptoms (please be specific):    How long has patient had these symptoms:    Pharmacy name and phone #:    Best Call Back Number: [phone]  Additional Information: called to discuss referral that was recieved

## 2018-09-16 ENCOUNTER — PATIENT MESSAGE (OUTPATIENT)
Dept: INFECTIOUS DISEASES | Facility: HOSPITAL | Age: 73
End: 2018-09-16

## 2019-01-23 ENCOUNTER — OFFICE VISIT (OUTPATIENT)
Dept: PODIATRY | Facility: CLINIC | Age: 74
End: 2019-01-23
Payer: MEDICARE

## 2019-01-23 VITALS
HEART RATE: 56 BPM | DIASTOLIC BLOOD PRESSURE: 76 MMHG | HEIGHT: 72 IN | BODY MASS INDEX: 34.27 KG/M2 | SYSTOLIC BLOOD PRESSURE: 145 MMHG | WEIGHT: 253 LBS

## 2019-01-23 DIAGNOSIS — B35.1 ONYCHOMYCOSIS DUE TO DERMATOPHYTE: ICD-10-CM

## 2019-01-23 DIAGNOSIS — M79.674 PAIN OF TOE OF RIGHT FOOT: ICD-10-CM

## 2019-01-23 DIAGNOSIS — L02.611 ABSCESS OF TOE OF RIGHT FOOT: ICD-10-CM

## 2019-01-23 DIAGNOSIS — E11.8 TYPE 2 DIABETES MELLITUS WITH COMPLICATION, WITHOUT LONG-TERM CURRENT USE OF INSULIN: Primary | ICD-10-CM

## 2019-01-23 PROCEDURE — 10060 I&D ABSCESS SIMPLE/SINGLE: CPT | Mod: ,,, | Performed by: PODIATRIST

## 2019-01-23 PROCEDURE — 99203 OFFICE O/P NEW LOW 30 MIN: CPT | Mod: 25,,, | Performed by: PODIATRIST

## 2019-01-23 PROCEDURE — 10060 PR DRAIN SKIN ABSCESS SIMPLE: ICD-10-PCS | Mod: ,,, | Performed by: PODIATRIST

## 2019-01-23 PROCEDURE — 99203 PR OFFICE/OUTPT VISIT, NEW, LEVL III, 30-44 MIN: ICD-10-PCS | Mod: 25,,, | Performed by: PODIATRIST

## 2019-01-23 RX ORDER — BROMPHENIRAMINE MALEATE, DEXTROMETHORPHAN HBR, PHENYLEPHRINE HCL, DIPHENHYDRAMINE HCL, PHENYLEPHRINE HCL 0.52G
KIT ORAL
COMMUNITY

## 2019-01-23 RX ORDER — OXYBUTYNIN CHLORIDE 10 MG/1
TABLET, EXTENDED RELEASE ORAL
Refills: 4 | COMMUNITY
Start: 2019-01-13 | End: 2019-03-14 | Stop reason: ALTCHOICE

## 2019-01-23 NOTE — PROGRESS NOTES
[address] [facility] [address]  [place] [address]  Phone: [phone]   [phone]    Patient's PCP:Elijah Thornton MD  Referring Provider: No ref. provider found    Subjective:      Chief Complaint:: Nail Problem (right first toenail has pus)    RALPH Stuart is a 73 y.o. male who presents with a complaint of  Infection right first toenail lasting for one day. My wife has noticed some black discoloration in my left first toenail. Current symptoms include none.  Aggravating factors are pressure. Patients rates pain 0/10 on pain scale.Have been having discoloration for about a month.I am a diabetic so I was concerned when I saw the infection in my right first toe.I was in the hospital for five months for sepsis.I was doing therapy and the therapist told me to wear bigger shoe size due to swelling.    Vitals:    01/23/19 1357   BP: (!) 145/76   Pulse: (!) 56     Shoe Size: 12    Past Surgical History:   Procedure Laterality Date    BACK SURGERY  2018    cardioversion  2012    ECHOCARDIOGRAM,TRANSESOPHAGEAL N/A 5/29/2018    Performed by [facility] at [facility] CHE    JOINT REPLACEMENT      TRANSESOPHAGEAL ECHOCARDIOGRAM WITH POSSIBLE CARDIOVERSION (EDUAR W/ POSS CARDIOVERSION) N/A 5/29/2018    Performed by Babita Montes MD at [facility] CATH LAB    VASECTOMY       Past Medical History:   Diagnosis Date    Atrial fibrillation     Colon polyp     Coronary artery disease     Diabetes mellitus     Diabetes mellitus, type 2     GERD (gastroesophageal reflux disease)     Hyperlipidemia     Hypertension      Family History   Problem Relation Age of Onset    Urolithiasis Neg Hx     Sickle cell trait Neg Hx     Kidney cancer Neg Hx         Social History:   Marital Status:   Alcohol History:  reports that he drinks alcohol.  Tobacco History:  reports that he has been smoking cigarettes.  He has a 50.00 pack-year smoking history. he has never used smokeless tobacco.  Drug History:  reports that  he does not use drugs.    Review of patient's allergies indicates:   Allergen Reactions    Daptomycin Other (See Comments)     Shakes and gets cold       Current Outpatient Medications   Medication Sig Dispense Refill    amiodarone (PACERONE) 200 MG Tab Take 200 mg by mouth once daily.       amlodipine (NORVASC) 10 MG tablet Take 10 mg by mouth once daily.       aspirin 325 MG tablet Take 162 mg by mouth once daily. 1/2 aspirin daily      atorvastatin (LIPITOR) 40 MG tablet Take 1 tablet (40 mg total) by mouth once daily.      benazepril (LOTENSIN) 40 MG tablet Take 40 mg by mouth once daily.       dextromethorphan-guaifenesin  mg (MUCINEX DM)  mg per 12 hr tablet Take 1 tablet by mouth every 12 (twelve) hours.      folic acid-vit B6-vit B12 2.5-25-2 mg (FOLBIC OR EQUIV) 2.5-25-2 mg Tab Take 1 tablet by mouth once daily. 30 tablet 0    furosemide (LASIX) 40 MG tablet Take 40 mg by mouth once daily.      metFORMIN (GLUCOPHAGE-XR) 500 MG 24 hr tablet Take 1,000 mg by mouth 2 (two) times daily with meals.       metoprolol succinate (TOPROL-XL) 25 MG 24 hr tablet Take 25 mg by mouth once daily.      montelukast (SINGULAIR) 10 mg tablet Take 10 mg by mouth every evening.      multivitamin-Ca-iron-minerals 18-0.4 mg Tab Take by mouth.      oxybutynin (DITROPAN-XL) 10 MG 24 hr tablet TK 1 T PO QD  4    psyllium 0.52 gram capsule Take by mouth.      tamsulosin (FLOMAX) 0.4 mg Cp24 Take 0.4 mg by mouth once daily.        No current facility-administered medications for this visit.        Review of Systems   Constitutional: Negative for chills, fatigue, fever and unexpected weight change.   HENT: Negative for hearing loss and trouble swallowing.    Eyes: Positive for photophobia and visual disturbance.   Respiratory: Negative for cough, shortness of breath and wheezing.    Cardiovascular: Negative for chest pain, palpitations and leg swelling.   Gastrointestinal: Negative for abdominal pain and  nausea.   Genitourinary: Positive for frequency. Negative for dysuria.   Musculoskeletal: Negative for arthralgias, back pain and joint swelling.   Skin: Negative for rash.   Neurological: Negative for tremors, seizures, weakness, numbness and headaches.   Hematological: Does not bruise/bleed easily.         Objective:        Physical Exam:   General    Constitutional: He is oriented to person, place, and time. He appears well-developed and well-nourished.   Neurological: He is alert and oriented to person, place, and time.   Psychiatric: He has a normal mood and affect.         Right Ankle/Foot Exam     Other   Sensation: normal    Left Ankle/Foot Exam     Other   Sensation: normal      Vascular Exam     Right Pulses  Dorsalis Pedis:      2+  Posterior Tibial:      2+        Left Pulses  Dorsalis Pedis:      2+  Posterior Tibial:      2+        Edema  Right Lower Leg: present  Left Lower Leg: present    Physical Exam   Constitutional: He is oriented to person, place, and time. He appears well-developed and well-nourished.   Cardiovascular:   Pulses:       Dorsalis pedis pulses are 2+ on the right side, and 2+ on the left side.        Posterior tibial pulses are 2+ on the right side, and 2+ on the left side.   Musculoskeletal:        Right lower leg: He exhibits edema.        Left lower leg: He exhibits edema.   Neurological: He is alert and oriented to person, place, and time.   Skin:   Dried blood is noted along the lateral border of the left great toe. No loosening of the nail noted. No pain on palpation. No erythema or drainage noted.    There is a bulla at the base of the lateral nail border of the right great toe. Following deroofing, a few drops of pus are seen. The area was inspected and deep wound was seen. No ingrowing of the nail seen. No surrounding erythema.    Psychiatric: He has a normal mood and affect.       Imaging: no imaging today          Assessment:       1. Type 2 diabetes mellitus with  "complication, without long-term current use of insulin    2. Abscess of toe of right foot    3. Pain of toe of right foot    4. Onychomycosis due to dermatophyte      Plan:   Type 2 diabetes mellitus with complication, without long-term current use of insulin  -     Incision and Drainage    Abscess of toe of right foot  Comments:  great toe   Orders:  -     Incision and Drainage    Pain of toe of right foot  -     Incision and Drainage    Onychomycosis due to dermatophyte      Follow-up if symptoms worsen or fail to improve.    Incision and Drainage  Date/Time: 1/23/2019 4:19 PM  Performed by: Jimi Tyler DPM  Authorized by: Jimi Tyler DPM     Time out: Immediately prior to procedure a "time out" was called to verify the correct patient, procedure, equipment, support staff and site/side marked as required.    Consent Done?:  Yes (Verbal)    Type:  Bulla  Body area:  Lower extremity  Location details:  Right big toe  Scalpel size: metal curette and forceps   Complexity:  Simple  Drainage:  Pus  Drainage amount:  Scant  Packing material:  None  Patient tolerance:  Patient tolerated the procedure well with no immediate complications    Proximal lateral nail border        Daily dressing changes with Neosporin  No barefoot  Check feet daily  Call if any signs of worsening    Counseling:     I provided patient education verbally regarding:   Patient diagnosis, treatment options, as well as alternatives, risks, and benefits.     Counseled patient on the aspects of diabetes and how it pertains to the feet.  I explained the importance of proper diabetic foot care and how it is essential for the health of their feet.      This note was created using Dragon voice recognition software that occasionally misinterpreted phrases or words.             "

## 2019-01-23 NOTE — PATIENT INSTRUCTIONS
Your Diabetes Foot Care Program    Every day you depend on your feet to keep you moving. But when you have diabetes, your feet need special care. Even a small foot problem can become very serious. So dont take your feet for granted. By working with your diabetes healthcare team, you can learn how to protect your feet and keep them healthy.  Evaluating your feet  An evaluation helps your healthcare provider check the condition of your feet. The evaluation includes a review of your diabetes history and overall health. It may also include a foot exam, X-rays, or other tests. These can help show problems beneath the skin that you cant see or feel.  Medical history  You will be asked about your overall health and any history of foot problems. Youll also discuss your diabetes history, such as whether your blood sugar level has changed over time. It also includes questions about sensations of pain, tingling, pins and needles, or numbness. Your healthcare provider will also want to know if you have high blood pressure and heart disease, or if you smoke. Be sure to mention any medicines (including over-the-counter), supplements, or herbal remedies you take.  Foot exam  A foot exam checks the condition of different parts of your foot. First, your skin and nails are examined for any signs of infection. Blood flow is checked by feeling for the pulses in each foot. You may also have tests to study the nerves in the foot. These include using a small filament (wire) to see how sensitive your feet are. In certain cases, you will be asked to walk a short distance to check for bone, joint, and muscle problems.  Diagnostic tests  If needed, your healthcare provider will suggest certain tests to learn more about your feet. These include:  · Doppler tests to measure blood flow in the feet and lower leg.  · X-rays, which can show bone or joint problems.  · Other imaging tests, such as an MRI (magnetic resonance imaging), bone scan,  and CT (computed tomography) scan. These can help show bone infections.  · Other tests, such as vascular tests, which study the blood flow in your feet and legs. You may also have nerve studies to learn how sensitive your feet are.  Creating a foot care program  Based on the evaluation, your healthcare provider will create a foot care program for you. Your program may be as simple as starting a daily self-care routine and changing the types of shoes your wear. It may also involve treating minor foot problems, such as a corn or blister. In some cases, surgery will be needed to treat an infection or mechanical problems, such as hammer toes.  Preventing problems  When you have diabetes, its easier to prevent problems than to treat them later on. So see your healthcare team for regular checkups and foot care. Your healthcare team can also help you learn more about caring for your feet at home. For example, you may be told to avoid walking barefoot. Or you may be told that special footwear is needed to protect your feet.  Have regular checkups  Foot problems can develop quickly. So be sure to follow your healthcare teams schedule for regular checkups. During office visits, take off your shoes and socks as soon as you get in the exam room. Ask your healthcare provider to examine your feet for problems. This will make it easier to find and treat small skin irritations before they get worse. Regular checkups can also help keep track of the blood flow and feeling in your feet. If you have neuropathy (lack of feeling in your feet), you will need to have checkups more often.  Learn about self-care  The more you know about diabetes and your feet, the easier it will be to prevent problems. Members of your healthcare team can teach you how to inspect your feet and teach you to look for warning signs. They can also give you other foot care tips. During office visits, be sure to ask any questions you have.  Date Last Reviewed:  7/1/2016 © 2000-2017 [employer]. [address] [place] [address], [place] [address]. All rights reserved. This information is not intended as a substitute for professional medical care. Always follow your healthcare professional's instructions.        Diabetic Foot Care  Diabetes can lead to a number of foot complications. Fortunately, you can prevent most of these with a little extra foot care. If diabetes is not well controlled, it can cause damage to blood vessels and result in poor circulation to the foot. When the skin does not get enough blood flow, it becomes prone to pressure sores and ulcers, which heal slowly.  Diabetes can also damage nerves, interfering with the ability to feel pain and pressure. When you cant feel your foot normally, it is easy to injure your skin, bones, and joints without knowing it. For these reasons diabetes increases the risk of fungal infections, bunions, and ulcers. An ulcer is a sore or break in the skin. With ulcers, often the skin seems to have worn away. Deep ulcers can lead to bone infection.  Gangrene is the most serious foot complication of diabetes. It usually occurs on the tips of the toes as blackened areas of skin. The black area is dead tissue. In severe cases, gangrene spreads to involve the entire toe, other toes, and the entire foot. Foot or toe amputation may be required. Good foot care and blood sugar control can prevent this.  Home care  · Wear comfortable, well-fitting shoes.  · Wash your feet daily with warm water and mild soap.  · After drying, apply a moisturizing cream or lotion to the top and bottom of your feet. Don't put lotion between toes.  · Check your feet daily for skin breaks, blisters, swelling, or redness. Look between your toes as well. If you cannot see the bottoms of your feet, ask someone to look or use a mirror.  · Wear cotton socks and change them every day.  · Trim toenails carefully, and do not cut your cuticles.  · Strive to keep  your blood sugar under control with a combination of medicines, diet, and activity.  · If you smoke and have diabetes, it is very important that you stop. Smoking reduces blood flow to your foot.  · Schedule foot exams at least every year, or more often if you have foot problems.  · Put your feet up when sitting, wiggle toes, and move ankles to help improve blood flow.  Avoid activities that increase your risk of foot injury:  · Do not walk barefoot.  · Do not use heating pads or hot water bottles on your feet.  · Do not put your foot in a hot tub without first checking the temperature with your hand.  Follow-up care  Follow up with your healthcare provider, or as advised. Be sure to take off your shoes and socks before your appointment starts so your healthcare provider will be sure to check your feet. Report any cut, puncture, scrape, blister, or other injury to your foot. Also report if you have a bunion, hammertoes, ingrown toenail, or ulcer on your foot.  When to seek medical advice  Call your healthcare provider right away if any of these occur:  · Black skin color anywhere on the foot  · Open ulcer with pus draining from the wound  · Increasing foot or leg pain  · New areas of redness or swelling or tender areas of the foot  · Fever of 100.4°F (38°C) or greater  Date Last Reviewed: 5/25/2016  © 7628-7682 [employer]. [address] [place] [address], [place] [address]. All rights reserved. This information is not intended as a substitute for professional medical care. Always follow your healthcare professional's instructions.

## 2019-03-14 ENCOUNTER — OFFICE VISIT (OUTPATIENT)
Dept: UROLOGY | Facility: CLINIC | Age: 74
End: 2019-03-14
Payer: MEDICARE

## 2019-03-14 ENCOUNTER — APPOINTMENT (OUTPATIENT)
Dept: LAB | Facility: HOSPITAL | Age: 74
End: 2019-03-14
Attending: UROLOGY
Payer: MEDICARE

## 2019-03-14 VITALS
BODY MASS INDEX: 35 KG/M2 | DIASTOLIC BLOOD PRESSURE: 57 MMHG | WEIGHT: 258.38 LBS | SYSTOLIC BLOOD PRESSURE: 137 MMHG | HEART RATE: 60 BPM | HEIGHT: 72 IN

## 2019-03-14 DIAGNOSIS — N31.9 NEUROGENIC BLADDER: ICD-10-CM

## 2019-03-14 DIAGNOSIS — R82.90 ABNORMAL FINDING IN URINE: ICD-10-CM

## 2019-03-14 DIAGNOSIS — N32.81 OAB (OVERACTIVE BLADDER): Primary | ICD-10-CM

## 2019-03-14 DIAGNOSIS — Z12.5 ENCOUNTER FOR SCREENING FOR MALIGNANT NEOPLASM OF PROSTATE: ICD-10-CM

## 2019-03-14 DIAGNOSIS — R35.1 NOCTURIA: ICD-10-CM

## 2019-03-14 LAB
BACTERIA #/AREA URNS HPF: ABNORMAL /HPF
BILIRUB UR QL STRIP: NEGATIVE
CLARITY UR: CLEAR
COLOR UR: YELLOW
GLUCOSE UR QL STRIP: ABNORMAL
HGB UR QL STRIP: NEGATIVE
HYALINE CASTS #/AREA URNS LPF: 0 /LPF
KETONES UR QL STRIP: NEGATIVE
LEUKOCYTE ESTERASE UR QL STRIP: ABNORMAL
MICROSCOPIC COMMENT: ABNORMAL
NITRITE UR QL STRIP: NEGATIVE
PH UR STRIP: 6 [PH] (ref 5–8)
PROT UR QL STRIP: ABNORMAL
RBC #/AREA URNS HPF: 0 /HPF (ref 0–4)
SP GR UR STRIP: >=1.03 (ref 1–1.03)
SQUAMOUS #/AREA URNS HPF: 2 /HPF
URN SPEC COLLECT METH UR: ABNORMAL
UROBILINOGEN UR STRIP-ACNC: NEGATIVE EU/DL
WBC #/AREA URNS HPF: 6 /HPF (ref 0–5)

## 2019-03-14 PROCEDURE — 87086 URINE CULTURE/COLONY COUNT: CPT

## 2019-03-14 PROCEDURE — 99205 PR OFFICE/OUTPT VISIT, NEW, LEVL V, 60-74 MIN: ICD-10-PCS | Mod: S$PBB,,, | Performed by: UROLOGY

## 2019-03-14 PROCEDURE — 81000 URINALYSIS NONAUTO W/SCOPE: CPT

## 2019-03-14 PROCEDURE — 99999 PR PBB SHADOW E&M-EST. PATIENT-LVL IV: CPT | Mod: PBBFAC,,, | Performed by: UROLOGY

## 2019-03-14 PROCEDURE — 99214 OFFICE O/P EST MOD 30 MIN: CPT | Mod: PBBFAC,PN | Performed by: UROLOGY

## 2019-03-14 PROCEDURE — 99999 PR PBB SHADOW E&M-EST. PATIENT-LVL IV: ICD-10-PCS | Mod: PBBFAC,,, | Performed by: UROLOGY

## 2019-03-14 PROCEDURE — 99205 OFFICE O/P NEW HI 60 MIN: CPT | Mod: S$PBB,,, | Performed by: UROLOGY

## 2019-03-14 RX ORDER — FUROSEMIDE 20 MG/1
TABLET ORAL
COMMUNITY
Start: 2019-03-12 | End: 2019-03-14 | Stop reason: ALTCHOICE

## 2019-03-14 NOTE — PATIENT INSTRUCTIONS
Let me know what psa is   Return for a urine flow study  Return for urodynamics on march 25th at 11am  Keep a voiding diary for 3 days

## 2019-03-14 NOTE — PROGRESS NOTES
[facility] - [place]  Staff: MD Tenisha    Referring provider and please cc: Michael Thornton  PCP: Ter[facility] Johnson [facility]    Chief Complaint: overactive bladder    Subjective:        HPI: Jun Stuart is a 73 y.o. male presents with     [phone], wife    He's seen  in the past and  most recently.  did a cystoscopy and uroflow in 2013. Capacity of 400cc and prostate without significant hypertropy. Uroflow showed peak flow 18. Since then and now he had no f/u. About a year ago (May 17 at [facility] for [facility] weeks, f/u with pulmonologist who repeated ct which is where a spinal lesion was seen. 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 He then went to [facility] for rehab and was there for 29 days)  Eventually he began having increasing sensation with BM and was able to have a BM in the toilet about 1.5months but he's not sure if he had urge. He also began urinating and having bowel movements that he could sense about 2-3 months later. He was also continuing to have incomplete emptying, urgency and frequency. Has not had uds yet. They put him on a bowel regimen and trained him to have a bm daily.     They state he is voiding every 2-3 hours during the day with the urge to go . He has urge to void 3-8x a night with large volume voids. Catheterizes prior to bedtime, maybe once in night and when he wakes up.  He states his most bothersome symptom is waking up at night to urinate or catheterize. He does not recall that he had this issue in 2013 and in those notes he was waking up to 10x a night. He says that he's been diagnosed with WANDA but could not do the CPAP for more than a year, however he hasn't tried this in over 5-10 years (prior to his nocturia diagnosis). 2 to 3  glasses of vodka before bedtime prior to bedtime. 3-4 cups of coffee in the morning. He urinates less during the day.     His wife says that he's also had a few uti's.   Not on flomax 0.4mg    ECOG Status: 0      REVIEW OF SYSTEMS:  General ROS: no fevers, no chills  Psychological ROS: no depression  Endocrine ROS: no heat or cold  Respiratory ROS: no SOB  Cardiovascular ROS: no CP  Gastrointestinal ROS: no abdominal pain, no constipation, no diarrhea, no BRBPR  Musculoskeletal ROS: no muscle pain  Neurological ROS: no headaches  Dermatological ROS: no rashes  HEENT: no glasses, no sinus   ROS: per HPI     PMHx:  Past Medical History:   Diagnosis Date    Atrial fibrillation     Colon polyp     Coronary artery disease     Diabetes mellitus     Diabetes mellitus, type 2     GERD (gastroesophageal reflux disease)     Hyperlipidemia     Hypertension      Kidney stones: No  Cataracts:none    PSHx:  Past Surgical History:   Procedure Laterality Date    BACK SURGERY      cardioversion      ECHOCARDIOGRAM,TRANSESOPHAGEAL N/A 2018    [facility]formed by [facility] at [facility] CHE    JOINT REPLACEMENT      TRANSESOPHAGEAL ECHOCARDIOGRAM WITH POSSIBLE CARDIOVERSION (EDUAR W/ POSS CARDIOVERSION) N/A 2018    Performed by Babita Montes MD at [facility] CATH LAB    VASECTOMY         Stents/Valves/Foreign Bodies: 2 cardiac stents, last one placed   Cardiac Evaluation:   Gastroenterologist:  did a colonsocopy years ago    Fam Hx:   malignancies: No  . Gyn malignancies: no. Mother  a 82 hear trouble and father  a 82 of asbestos/cardiac condition  kidney stones: No     Soc Hx:  +tobacco.  1pk per day x 30 year  2-3 vodkas a night, alcohol  Lives in [place]  :yes  Children: 3  Occupation: retired from construction    Allergies:  Daptomycin    Home Medications: reviewed   Urologic Medications: none  Anticoagulation: No    Objective:     Vitals:    19 0920   BP:  (!) 137/57   Pulse: 60         General:WDWN in NAD  Eyes: PERRLA, normal conjunctiva  Respiratory: No increased work on breathing.   Cardiovascular: No obvious extremity edema. Warm and well perfused.   GI: palpation of masses. No tenderness. No hepatosplenomegaly to palpation.  Musculoskeletal: normal range of motion of bilateral upper extremities. Normal muscle strength and tone.  Skin: no obvious rashes or lesions. No tightening of skin noted.  Neurologic: CN grossly normal. Normal sensation.   Psychiatric: awake, alert and oriented x 3. Mood and affect normal. Cooperative.    :  Inspection of anus normal  No scrotal rashes, cysts or lesions  Epididymis normal in size, no tenderness  Testes normal and size, equal size bilaterally, no masses  Urethral meatus normal without discharge  Penis is circumcised   BERNADETTE: 30g gland without masses, tenderness. SV not palpable. Normal sphincter tone. No hemhorroids.  No bilateral inguinal hernias noted     LABS REVIEW:  Urinalysis void: 2+protein/1+glucose/trace leuk- sent for culture  Urine history: none    Lab Results   Component Value Date    WBC 8.40 06/14/2018    HGB 9.0 (L) 06/14/2018    HCT 26.5 (L) 06/14/2018    MCV 95 06/14/2018     06/14/2018     BMP  Lab Results   Component Value Date     (L) 06/14/2018    K 4.3 06/14/2018    CL 94 (L) 06/14/2018    CO2 28 06/14/2018    BUN 21 06/14/2018    CREATININE 1.2 06/14/2018    CALCIUM 9.7 06/14/2018    ANIONGAP 11 06/14/2018    ESTGFRAFRICA >60 06/14/2018    EGFRNONAA >60 06/14/2018         PSA:     No results found for: PSA  Testosterone: No results found for: TESTOSTERONE    PATHOLOGY REVIEW:  Cytology 12/4/12: atypical    RADIOGRAPHIC REVIEW:  ctap 5/28/18  Normal kidneys, normal bladder, normal prostate     rbus 2012  Upper normal in size kidneys, which are otherwise normal in appearance.      Assessment:       1. OAB (overactive bladder)    2. Abnormal finding in urine     3. Neurogenic bladder    4.  Nocturia          Plan:     I think the patient has neurogenic bladder as a result of his spinal abscess. Initially he had retention and now it sounds like possible detrusor overactivity vs detrusor sphincter dysnergia. He also has diabetes that could affect his nerves but usually this causes retention and based on his residuals does not sound like he still has sensation with low residuals.    Today his main complaint and I reviewed with him that this was actually his complaint in 2013. He has untreated WANDA and I tried to explain how this could be a primary cause of nocturia and treatment WANDA could help resolve some component and decrease his nocturnal episodes.     I explained that if we did find the cause of his bladder issues and it led to decreased leakage and daytime urinary episodes I cannot guarantee that he would have any significant improvement in his nighttime episodes without treatment of his WANDA.  In addition he drinks 4 vodkas prior to bedtime and 4 cups of coffee in the morning. Can d/c flomax. Could consider trying an oab med understanding that without changing lifestyle or willingness to try cpap or get evaluated again will likely have no improvement    At this time I recommend urodynamics to better objectively evaluate his bladder as pt has a difficult understanding or explaining his condition. He will return next week for a uroflow and pvr and then again for urodynamics on the 25th. I did explain I do not think that he has bph based on BERNADETTE today and cysto in 2013, however uds will help with this as well.     His wife will get a copy of his psa, says it was obtained recently by  and was normal- no psa was done, will order     Marta Steven MD

## 2019-03-15 ENCOUNTER — TELEPHONE (OUTPATIENT)
Dept: UROLOGY | Facility: CLINIC | Age: 74
End: 2019-03-15

## 2019-03-15 NOTE — TELEPHONE ENCOUNTER
----- Message from Marta Steven MD sent at 3/15/2019  2:16 PM CDT -----  Please schedule a psa for this pt to be done the same day he comes in for a urinalysi

## 2019-03-16 LAB — BACTERIA UR CULT: NORMAL

## 2019-03-19 ENCOUNTER — CLINICAL SUPPORT (OUTPATIENT)
Dept: UROLOGY | Facility: CLINIC | Age: 74
End: 2019-03-19
Payer: MEDICARE

## 2019-03-19 DIAGNOSIS — R33.9 URINARY RETENTION: ICD-10-CM

## 2019-03-19 DIAGNOSIS — N17.9 AKI (ACUTE KIDNEY INJURY): Primary | ICD-10-CM

## 2019-03-19 LAB
BACTERIA #/AREA URNS HPF: ABNORMAL /HPF
BILIRUB UR QL STRIP: NEGATIVE
CLARITY UR: CLEAR
COLOR UR: YELLOW
GLUCOSE UR QL STRIP: ABNORMAL
GRAN CASTS #/AREA URNS LPF: 1 /LPF
HGB UR QL STRIP: NEGATIVE
HYALINE CASTS #/AREA URNS LPF: 0 /LPF
KETONES UR QL STRIP: NEGATIVE
LEUKOCYTE ESTERASE UR QL STRIP: NEGATIVE
MICROSCOPIC COMMENT: ABNORMAL
NITRITE UR QL STRIP: NEGATIVE
NON-SQ EPI CELLS #/AREA URNS HPF: 1 /HPF
PH UR STRIP: 7 [PH] (ref 5–8)
POC RESIDUAL URINE VOLUME: 293 ML (ref 0–100)
PROT UR QL STRIP: ABNORMAL
RBC #/AREA URNS HPF: 2 /HPF (ref 0–4)
SP GR UR STRIP: 1.01 (ref 1–1.03)
SQUAMOUS #/AREA URNS HPF: 2 /HPF
URN SPEC COLLECT METH UR: ABNORMAL
UROBILINOGEN UR STRIP-ACNC: NEGATIVE EU/DL
WBC #/AREA URNS HPF: 6 /HPF (ref 0–5)

## 2019-03-19 PROCEDURE — 51741 PR UROFLOWMETRY, COMPLEX: ICD-10-PCS | Mod: 26,S$PBB,, | Performed by: UROLOGY

## 2019-03-19 PROCEDURE — 81000 URINALYSIS NONAUTO W/SCOPE: CPT

## 2019-03-19 PROCEDURE — 99499 UNLISTED E&M SERVICE: CPT | Mod: S$PBB,,, | Performed by: UROLOGY

## 2019-03-19 PROCEDURE — 51798 US URINE CAPACITY MEASURE: CPT | Mod: PBBFAC,PN

## 2019-03-19 PROCEDURE — 51741 ELECTRO-UROFLOWMETRY FIRST: CPT | Mod: 26,S$PBB,, | Performed by: UROLOGY

## 2019-03-19 PROCEDURE — 99499 NO LOS: ICD-10-PCS | Mod: S$PBB,,, | Performed by: UROLOGY

## 2019-03-19 PROCEDURE — 87086 URINE CULTURE/COLONY COUNT: CPT

## 2019-03-19 NOTE — PROGRESS NOTES
Uroflow results (date: 03/18/2019) on  : Voiding time: 21.3s, Flow time: 21.2s, TTP flow: 3.8s, Peak flowrate: 17.4 mL/s, Average flowrate: 7.5 mL/s, Intervals: 1,  Voided volume: 159 mL,  Pvr by bladder scan: 293. Pattern of curve: to be determined by physician.

## 2019-03-20 LAB
BACTERIA UR CULT: NORMAL
BACTERIA UR CULT: NORMAL

## 2019-03-21 ENCOUNTER — TELEPHONE (OUTPATIENT)
Dept: UROLOGY | Facility: CLINIC | Age: 74
End: 2019-03-21

## 2019-03-25 ENCOUNTER — TELEPHONE (OUTPATIENT)
Dept: UROLOGY | Facility: CLINIC | Age: 74
End: 2019-03-25

## 2019-03-25 NOTE — TELEPHONE ENCOUNTER
Called pt ,spoke with wife. urodynamics procedure had to be cancelled due to machine not working. Advised pt as soon as  machine is up and running appt will be rescheduled. Understanding verbalized. No further action needed

## 2019-03-29 NOTE — TELEPHONE ENCOUNTER
Please let pt know we do not know when uds machine will be fixed. See if he is willing to go to main campus to have done by another urologist.    Let them know that his bladder scan showed he still had 300 left in his bladder after urinating. Ask him to void to completion (urinate as much as he can until he feels empty) and then immediately put a catheter in and record the amount that is left.     Ask him to call us with this number. If this is greater than 300 he will need earlier follow-up. He should continue flomax.

## 2019-04-01 ENCOUNTER — TELEPHONE (OUTPATIENT)
Dept: UROLOGY | Facility: CLINIC | Age: 74
End: 2019-04-01

## 2019-04-02 ENCOUNTER — TELEPHONE (OUTPATIENT)
Dept: UROLOGY | Facility: CLINIC | Age: 74
End: 2019-04-02

## 2019-04-02 NOTE — TELEPHONE ENCOUNTER
Post void residual amount was 150 mL. Will set up urodynamics appt and nurse visit 1 week prior for ua, ua micro and culture.

## 2019-04-02 NOTE — TELEPHONE ENCOUNTER
----- Message from Angel Luis Styles sent at 4/2/2019  9:28 AM CDT -----  Contact: pt's wife  Type:  Patient Returning Call    Who Called:  Pt's wife Isaura  Who Left Message for Patient:  Clara  Does the patient know what this is regarding?:  yes  Best Call Back Number:  [phone]  Additional Information:  He voided 100cc and the pt's wife catheterize the pt and got 150cc.

## 2019-04-08 ENCOUNTER — APPOINTMENT (OUTPATIENT)
Dept: LAB | Facility: HOSPITAL | Age: 74
End: 2019-04-08
Attending: UROLOGY
Payer: MEDICARE

## 2019-04-08 ENCOUNTER — CLINICAL SUPPORT (OUTPATIENT)
Dept: UROLOGY | Facility: CLINIC | Age: 74
End: 2019-04-08
Payer: MEDICARE

## 2019-04-08 DIAGNOSIS — R82.998 CELLS AND CASTS IN URINE: Primary | ICD-10-CM

## 2019-04-08 LAB
BACTERIA #/AREA URNS HPF: ABNORMAL /HPF
BILIRUB UR QL STRIP: NEGATIVE
CLARITY UR: CLEAR
COLOR UR: YELLOW
GLUCOSE UR QL STRIP: NEGATIVE
HGB UR QL STRIP: ABNORMAL
HYALINE CASTS #/AREA URNS LPF: 0 /LPF
KETONES UR QL STRIP: NEGATIVE
LEUKOCYTE ESTERASE UR QL STRIP: ABNORMAL
MICROSCOPIC COMMENT: ABNORMAL
NITRITE UR QL STRIP: NEGATIVE
PH UR STRIP: 6 [PH] (ref 5–8)
PROT UR QL STRIP: ABNORMAL
RBC #/AREA URNS HPF: 4 /HPF (ref 0–4)
SP GR UR STRIP: 1.02 (ref 1–1.03)
SQUAMOUS #/AREA URNS HPF: 2 /HPF
URN SPEC COLLECT METH UR: ABNORMAL
UROBILINOGEN UR STRIP-ACNC: NEGATIVE EU/DL
WBC #/AREA URNS HPF: 16 /HPF (ref 0–5)

## 2019-04-08 PROCEDURE — 87086 URINE CULTURE/COLONY COUNT: CPT

## 2019-04-08 PROCEDURE — 81000 URINALYSIS NONAUTO W/SCOPE: CPT

## 2019-04-08 PROCEDURE — 87088 URINE BACTERIA CULTURE: CPT

## 2019-04-08 NOTE — PROGRESS NOTES
Patient arrived to clinic to give urine sample, given clean catch, specimen prepared for lab pickup.

## 2019-04-10 LAB — BACTERIA UR CULT: NORMAL

## 2019-04-10 RX ORDER — AMOXICILLIN AND CLAVULANATE POTASSIUM 875; 125 MG/1; MG/1
1 TABLET, FILM COATED ORAL 2 TIMES DAILY
Qty: 10 TABLET | Refills: 0 | Status: SHIPPED | OUTPATIENT
Start: 2019-04-10 | End: 2019-04-15

## 2019-04-12 ENCOUNTER — TELEPHONE (OUTPATIENT)
Dept: UROLOGY | Facility: CLINIC | Age: 74
End: 2019-04-12

## 2019-04-12 NOTE — TELEPHONE ENCOUNTER
----- Message from Yarelis Gilman sent at 4/12/2019  9:17 AM CDT -----  Contact: wife Isaura Stuart  Patient spouse calling to speak to nurse regarding antibiotic that patient was prescribed for UTI but wife will like to know if the medication patient is already on (antibotic and steroid medication  For a cold ) will the medication  clear up the UTI or should she still get the antibiotic for UTI per wife       Please call to advice [phone] (home)

## 2019-04-12 NOTE — TELEPHONE ENCOUNTER
Returned call, informed the MD got the message and states to not take the augmentin and to take the doxy that he has. She verbally understood.

## 2019-04-15 ENCOUNTER — PROCEDURE VISIT (OUTPATIENT)
Dept: UROLOGY | Facility: CLINIC | Age: 74
End: 2019-04-15
Payer: MEDICARE

## 2019-04-15 DIAGNOSIS — R33.9 URINARY RETENTION: ICD-10-CM

## 2019-04-15 DIAGNOSIS — R82.998 CELLS AND CASTS IN URINE: ICD-10-CM

## 2019-04-15 DIAGNOSIS — Z12.5 ENCOUNTER FOR SCREENING FOR MALIGNANT NEOPLASM OF PROSTATE: ICD-10-CM

## 2019-04-15 DIAGNOSIS — N32.81 OAB (OVERACTIVE BLADDER): ICD-10-CM

## 2019-04-15 DIAGNOSIS — N31.9 NEUROGENIC BLADDER: Primary | ICD-10-CM

## 2019-04-15 PROCEDURE — 51784 PR ANAL/URINARY MUSCLE STUDY: ICD-10-PCS | Mod: 26,51,S$PBB, | Performed by: UROLOGY

## 2019-04-15 PROCEDURE — 99499 UNLISTED E&M SERVICE: CPT | Mod: S$PBB,,, | Performed by: UROLOGY

## 2019-04-15 PROCEDURE — 51728 PR COMPLEX CYSTOMETROGRAM VOIDING PRESSURE STUDIES: ICD-10-PCS | Mod: 26,S$PBB,, | Performed by: UROLOGY

## 2019-04-15 PROCEDURE — 51797 PR VOIDING PRESS STUDY INTRA-ABDOMINAL VOID: ICD-10-PCS | Mod: 26,S$PBB,, | Performed by: UROLOGY

## 2019-04-15 PROCEDURE — 51728 CYSTOMETROGRAM W/VP: CPT | Mod: 26,S$PBB,, | Performed by: UROLOGY

## 2019-04-15 PROCEDURE — 87086 URINE CULTURE/COLONY COUNT: CPT

## 2019-04-15 PROCEDURE — 51784 ANAL/URINARY MUSCLE STUDY: CPT | Mod: PBBFAC,PN | Performed by: UROLOGY

## 2019-04-15 PROCEDURE — 99499 NO LOS: ICD-10-PCS | Mod: S$PBB,,, | Performed by: UROLOGY

## 2019-04-15 PROCEDURE — 51797 INTRAABDOMINAL PRESSURE TEST: CPT | Mod: 26,S$PBB,, | Performed by: UROLOGY

## 2019-04-15 PROCEDURE — 51784 ANAL/URINARY MUSCLE STUDY: CPT | Mod: 26,51,S$PBB, | Performed by: UROLOGY

## 2019-04-15 PROCEDURE — 51728 CYSTOMETROGRAM W/VP: CPT | Mod: PBBFAC,PN | Performed by: UROLOGY

## 2019-04-15 PROCEDURE — 51741 ELECTRO-UROFLOWMETRY FIRST: CPT | Mod: PBBFAC,PN | Performed by: UROLOGY

## 2019-04-15 RX ORDER — TAMSULOSIN HYDROCHLORIDE 0.4 MG/1
0.4 CAPSULE ORAL NIGHTLY
Qty: 90 CAPSULE | Refills: 0 | Status: SHIPPED | OUTPATIENT
Start: 2019-04-15

## 2019-04-16 NOTE — PROCEDURES
Procedures     [facility]  Urodynamic Report    Indication: Jun Stuart is a 73 y.o. male  With voiding dysfunction and possible neurogenic bladder.     See associated progress note for details.     Procedure:   Patient was taken to the Urodynamic Suite with a comfortably full bladder and asked to perform a free uroflow.  Next, the patient was prepped and the urinary residual was drained with a 14 Fr catheter.  A 7 Fr dual lumen catheter was placed to measure intravesical pressures.  A 10 Fr balloon manometer was placed into the rectum for abdominal pressure measurements.  Patch EMG electrodes were placed on the perineum.  The patient was connected to the [facility] Urodynamic machine, using a multichannel technique, the data were interpreted.  The bladder was filled with sterile water at room temperature at a rate of 60 ml/min.  Patient is filled to 324.  Filling is performed with the patient in the seated position.  Abdominal leak point pressures are not checked.  The patient was then asked to void for a pressure flow study.    The following are the results of the study:  1.  Uroflow   Q max:  0.9   Voided volume: 2   Pattern of the curve: could  Not be assessed    2.  PVR by in and out cath: 300    3.  CMG   Sensation: delayed intially         First sensation: 180    First desire: 200    Strong desire:250         Capacity: 324   Abnormal Contractions: at 1st sensation and at 324 began to have rise in detrussor pressure with delayed sensation    Compliance: when he had a rise in pdet to 30s to 40s he was able to     4.  Abdominal Leak Point Pressure:  Did not check    5.  EMG:  normal    6.  Voiding phase   Qmax:  164.40   Average flow: 0.40   P det at Q max: 40   Pattern of the curve: delayed   Voided volume: 185     PVR by in and out cath: 91*    7.  Analysis:    He has delayed sensation with detrusor overactivity associated with leak/urge and empties mostly to completion. His dP  rises to 40 and up to 50 but this is associated with leak/void so he has a safe bladder. He leaks/voids mostly to completion with residuals of 70s to 90s.     8.  Recommendations/Plan:  See progress note

## 2019-04-16 NOTE — PROCEDURES
[facility] Note - [place]  Staff: MD Tenisha      Referring provider and please cc: Michael Thornton  PCP: [facility]    Chief Complaint: overactive bladder    Subjective:        HPI: Jun Stuart is a 73 y.o. male presents with     [phone], wife    He's been seen  and Dr.Raheem Dhillon did a cystoscopy (prostate without significant hypertropy) and uroflow in 2013 (capacity 400, peak flow 18). He had no f/u until about a year ago. 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  . He began urinating and having bowel movements that he could sense about 2-3 months later but was continuing to have incomplete emptying, urgency and frequency. No UDS.     He stated he was voiding q 2-3 hours during the day with the urge but more bothersome was  urge to void 3-8x a night with large volume voids. Catheterizes prior to bedtime, maybe once in night and when he wakes up. He does not recall that he had this issue in 2013 and in those notes he was waking up to 10x a night. He says that he's been diagnosed with WANDA but could not do the CPAP for more than a year, however he hasn't tried this in over 5-10 years (prior to his nocturia diagnosis). He also drinks 2 to 3 glasses of vodka before bedtime prior to bedtime. 3-4 cups of coffee in the morning. He urinates less during the day.     His wife says that he's also had a few uti's.   Not on flomax 0.4mg    I asked them to return today for urodynamics to better evaluate his situation. He's had no nocturia the last 3 nights and he says he's been having increasing urgency to void. He also says he stops drinking liquor 1 hour before bedtime    AUA ssx:(0 incomplete emptying, 3 frequency, 0 intermittency, 4 urgency, 0 weak stream, 5 straining, 5 sleeping). 17. QOL: terrible      ECOG Status: 0      REVIEW OF SYSTEMS:  General ROS: no fevers,  no chills  Psychological ROS: no depression  Endocrine ROS: no heat or cold  Respiratory ROS: no SOB  Cardiovascular ROS: no CP  Gastrointestinal ROS: no abdominal pain, no constipation, no diarrhea, no BRBPR  Musculoskeletal ROS: no muscle pain  Neurological ROS: no headaches  Dermatological ROS: no rashes  HEENT: no glasses, no sinus   ROS: per HPI     PMHx:  Past Medical History:   Diagnosis Date    Atrial fibrillation     Colon polyp     Coronary artery disease     Diabetes mellitus     Diabetes mellitus, type 2     GERD (gastroesophageal reflux disease)     Hyperlipidemia     Hypertension      Kidney stones: No  Cataracts:none    PSHx:  Past Surgical History:   Procedure Laterality Date    BACK SURGERY      cardioversion      ECHOCARDIOGRAM,TRANSESOPHAGEAL N/A 2018    Performed [facility] CHE    JOINT REPLACEMENT      TRANSESOPHAGEAL ECHOCARDIOGRAM WITH POSSIBLE CARDIOVERSION (EDUAR W/ POSS CARDIOVERSION) N/A 2018    Performed by Babita Montes MD at [facility] CATH LAB    VASECTOMY         Stents/Valves/Foreign Bodies: 2 cardiac stents, last one placed   Cardiac Evaluation:   Gastroenterologist:  did a colonsocopy years ago    Fam Hx:   malignancies: No  . Gyn malignancies: no. Mother  a 82 hear trouble and father  a 82 of asbestos/cardiac condition  kidney stones: No     Soc Hx:  +tobacco.  1pk per day x 30 year  2-3 vodkas a night, alcohol  Lives in [place]  :yes  Children: 3  Occupation: retired from construction    Allergies:  Daptomycin    Home Medications: reviewed   Urologic Medications: none  Anticoagulation: No    Objective:     Vitals reviewed     General:WDWN in NAD  Eyes: PERRLA, normal conjunctiva  Respiratory: No increased work on breathing.   Cardiovascular: No obvious extremity edema. Warm and well perfused.   GI: palpation of masses. No tenderness. No hepatosplenomegaly to palpation.  Musculoskeletal:  normal range of motion of bilateral upper extremities. Normal muscle strength and tone.  Skin: no obvious rashes or lesions. No tightening of skin noted.  Neurologic: CN grossly normal. Normal sensation.   Psychiatric: awake, alert and oriented x 3. Mood and affect normal. Cooperative.     3/15/19  Inspection of anus normal  No scrotal rashes, cysts or lesions  Epididymis normal in size, no tenderness  Testes normal and size, equal size bilaterally, no masses  Urethral meatus normal without discharge  Penis is circumcised   BERNADETTE: 30g gland without masses, tenderness. SV not palpable. Normal sphincter tone. No hemhorroids.  No bilateral inguinal hernias noted     LABS REVIEW:  Urinalysis void: tr leuk  Urine history:   4/8/2019       VIRIDANS STREPTOCOCCUS . . .   3/19/2019      10:19 AM clinically necessary.   3/19/2019      10:19 AM Multiple organisms isolated. None in predominance.  Repeat if   3/14/2019       No significant growth   5/26/2018       No growth   5/18/2018       No growth   12/4/2012       MULTIPLE ORGANISMS ISOLATED. NONE IN PREDOMINANCE. REPEAT IF CLINICALLY NECESSARY.         Lab Results   Component Value Date    WBC 8.40 06/14/2018    HGB 9.0 (L) 06/14/2018    HCT 26.5 (L) 06/14/2018    MCV 95 06/14/2018     06/14/2018     BMP  Lab Results   Component Value Date     (L) 06/14/2018    K 4.3 06/14/2018    CL 94 (L) 06/14/2018    CO2 28 06/14/2018    BUN 21 06/14/2018    CREATININE 1.2 06/14/2018    CALCIUM 9.7 06/14/2018    ANIONGAP 11 06/14/2018    ESTGFRAFRICA >60 06/14/2018    EGFRNONAA >60 06/14/2018         PSA:     No results found for: PSA  Testosterone: No results found for: TESTOSTERONE    PATHOLOGY REVIEW:  Cytology 12/4/12: atypical    RADIOGRAPHIC REVIEW:  ctap 5/28/18  Normal kidneys, normal bladder, normal prostate     rbus 2012  Upper normal in size kidneys, which are otherwise normal in appearance.      Assessment:       1. Neurogenic bladder    2. Cells and casts in  "urine    3. Urinary retention    4. OAB (overactive bladder)    5. Encounter for screening for malignant neoplasm of prostate           Plan:     At this point I recommend keeping a voiding diary for 3 days of voided amount with immediate post void amounts. Then restart flomax 0.4mg nightly. After 1 week again for 3days record voided amount with immediate post void residual. Will see if he has improvement with addition of flomax 0.4mg. (previous cysto showed small prostate, BERNADETTE showed small prostate). He also had no increased EMG on UDS.    If he has no difference in residuals on flomax he can discontinue unless he feels like his flow is better on flomax and he can start an OAB med. On the OAB med, again need to check voided amounts, note if he leaks, and check immediate residuals. If he has higher residuals may need to take OAB med and do CIC regularly (and d/c flomax if not voiding).    Did explain unclear if he will be back to "normal" again and expressly stated that drinking, especially alcohol within 4 hours of bedtime is going to result in nocturia (especially with h/o untreated WANDA) or nocturnal incontinence (if he takes a sleeping pill to sleep through the night).     He is already on abx and will complete this.  They are on my chart and his wife will forward me the charts as above.   F/u in 6 weeks to discuss further management in person and review psa  New script sent in for flomax  Urine sent for culture      Marta Steven MD  "

## 2019-04-17 LAB — BACTERIA UR CULT: NO GROWTH

## 2019-04-18 NOTE — PROCEDURES
[facility] Note - [place]  Staff: MD Tenisha      Referring provider and please cc: Michael Thornton  PCP: Terrence Johnson [facility]:active    Chief Complaint: overactive bladder    Subjective:        HPI: Jun Stuart is a 73 y.o. male presents with     [phone], wife    He's been seen  and Dr.Raheem Dhillon did a cystoscopy (prostate without significant hypertropy) and uroflow in 2013 (capacity 400, peak flow 18). He had no f/u until about a year ago. 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  . He began urinating and having bowel movements that he could sense about 2-3 months later but was continuing to have incomplete emptying, urgency and frequency. No UDS.     He stated he was voiding q 2-3 hours during the day with the urge but more bothersome was  urge to void 3-8x a night with large volume voids. Catheterizes prior to bedtime, maybe once in night and when he wakes up. He does not recall that he had this issue in 2013 and in those notes he was waking up to 10x a night. He says that he's been diagnosed with WANDA but could not do the CPAP for more than a year, however he hasn't tried this in over 5-10 years (prior to his nocturia diagnosis). He also drinks 2 to 3 glasses of vodka before bedtime prior to bedtime. 3-4 cups of coffee in the morning. He urinates less during the day.     His wife says that he's also had a few uti's.   Not on flomax 0.4mg    I asked them to return today for urodynamics to better evaluate his situation. He's had no nocturia the last 3 nights and he says he's been having increasing urgency to void. He also says he stops drinking liquor 1 hour before bedtime    AUA ssx:(0 incomplete emptying, 3 frequency, 0 intermittency, 4 urgency, 0 weak stream, 5 straining, 5 sleeping). 17. QOL: terrible      ECOG Status: 0      REVIEW OF SYSTEMS:  General ROS: no fevers,  no chills  Psychological ROS: no depression  Endocrine ROS: no heat or cold  Respiratory ROS: no SOB  Cardiovascular ROS: no CP  Gastrointestinal ROS: no abdominal pain, no constipation, no diarrhea, no BRBPR  Musculoskeletal ROS: no muscle pain  Neurological ROS: no headaches  Dermatological ROS: no rashes  HEENT: no glasses, no sinus   ROS: per HPI     PMHx:  Past Medical History:   Diagnosis Date    Atrial fibrillation     Colon polyp     Coronary artery disease     Diabetes mellitus     Diabetes mellitus, type 2     GERD (gastroesophageal reflux disease)     Hyperlipidemia     Hypertension      Kidney stones: No  Cataracts:none    PSHx:  Past Surgical History:   Procedure Laterality Date    BACK SURGERY      cardioversion      ECHOCARDIOGRAM,TRANSESOPHAGEAL N/A 2018    [facility]formed by [facility] CHE    JOINT REPLACEMENT      TRANSESOPHAGEAL ECHOCARDIOGRAM WITH POSSIBLE CARDIOVERSION (EDUAR W/ POSS CARDIOVERSION) N/A 2018    Performed by [facility] MD at [facility] CATH LAB    VASECTOMY         Stents/Valves/Foreign Bodies: 2 cardiac stents, last one placed   Cardiac Evaluation:   Gastroenterologist:  did a colonsocopy years ago    Fam Hx:   malignancies: No  . Gyn malignancies: no. Mother  a 82 hear trouble and father  a 82 of asbestos/cardiac condition  kidney stones: No     Soc Hx:  +tobacco.  1pk per day x 30 year  2-3 vodkas a night, alcohol  Lives in [place]  :yes  Children: 3  Occupation: retired from construction    Allergies:  Daptomycin    Home Medications: reviewed   Urologic Medications: none  Anticoagulation: No    Objective:     Vitals reviewed     General:WDWN in NAD  Eyes: PERRLA, normal conjunctiva  Respiratory: No increased work on breathing.   Cardiovascular: No obvious extremity edema. Warm and well perfused.   GI: palpation of masses. No tenderness. No hepatosplenomegaly to palpation.  Musculoskeletal:  normal range of motion of bilateral upper extremities. Normal muscle strength and tone.  Skin: no obvious rashes or lesions. No tightening of skin noted.  Neurologic: CN grossly normal. Normal sensation.   Psychiatric: awake, alert and oriented x 3. Mood and affect normal. Cooperative.     3/15/19  Inspection of anus normal  No scrotal rashes, cysts or lesions  Epididymis normal in size, no tenderness  Testes normal and size, equal size bilaterally, no masses  Urethral meatus normal without discharge  Penis is circumcised   BERNADETTE: 30g gland without masses, tenderness. SV not palpable. Normal sphincter tone. No hemhorroids.  No bilateral inguinal hernias noted     LABS REVIEW:  Urinalysis void: tr leuk  Urine history:   4/8/2019       VIRIDANS STREPTOCOCCUS . . .   3/19/2019      10:19 AM clinically necessary.   3/19/2019      10:19 AM Multiple organisms isolated. None in predominance.  Repeat if   3/14/2019       No significant growth   5/26/2018       No growth   5/18/2018       No growth   12/4/2012       MULTIPLE ORGANISMS ISOLATED. NONE IN PREDOMINANCE. REPEAT IF CLINICALLY NECESSARY.         Lab Results   Component Value Date    WBC 8.40 06/14/2018    HGB 9.0 (L) 06/14/2018    HCT 26.5 (L) 06/14/2018    MCV 95 06/14/2018     06/14/2018     BMP  Lab Results   Component Value Date     (L) 06/14/2018    K 4.3 06/14/2018    CL 94 (L) 06/14/2018    CO2 28 06/14/2018    BUN 21 06/14/2018    CREATININE 1.2 06/14/2018    CALCIUM 9.7 06/14/2018    ANIONGAP 11 06/14/2018    ESTGFRAFRICA >60 06/14/2018    EGFRNONAA >60 06/14/2018         PSA:     No results found for: PSA  Testosterone: No results found for: TESTOSTERONE    PATHOLOGY REVIEW:  Cytology 12/4/12: atypical    RADIOGRAPHIC REVIEW:  ctap 5/28/18  Normal kidneys, normal bladder, normal prostate     rbus 2012  Upper normal in size kidneys, which are otherwise normal in appearance.      Assessment:       1. Neurogenic bladder    2. Cells and casts in  "urine    3. Urinary retention    4. OAB (overactive bladder)    5. Encounter for screening for malignant neoplasm of prostate           Plan:     At this point I recommend keeping a voiding diary for 3 days of voided amount with immediate post void amounts. Then restart flomax 0.4mg nightly. After 1 week again for 3days record voided amount with immediate post void residual. Will see if he has improvement with addition of flomax 0.4mg. (previous cysto showed small prostate, BERNADETTE showed small prostate). He also had no increased EMG on UDS.    If he has no difference in residuals on flomax he can discontinue unless he feels like his flow is better on flomax and he can start an OAB med. On the OAB med, again need to check voided amounts, note if he leaks, and check immediate residuals. If he has higher residuals may need to take OAB med and do CIC regularly (and d/c flomax if not voiding).    Did explain unclear if he will be back to "normal" again and expressly stated that drinking, especially alcohol within 4 hours of bedtime is going to result in nocturia (especially with h/o untreated WANDA) or nocturnal incontinence (if he takes a sleeping pill to sleep through the night).     He is already on abx and will complete this.  They are on my chart and his wife will forward me the charts as above.   F/u in 6 weeks to discuss further management in person and review psa  New script sent in for flomax  Urine sent for culture      Marta Steven MD    "

## 2019-04-18 NOTE — PROCEDURES
Procedures     [facility]  Urodynamic Report    Indication: Jun Stuart is a 73 y.o. male  With voiding dysfunction and possible neurogenic bladder.     See associated progress note for details.     Procedure:   Patient was taken to the Urodynamic Suite with a comfortably full bladder and asked to perform a free uroflow.  Next, the patient was prepped and the urinary residual was drained with a 14 Fr catheter.  A 7 Fr dual lumen catheter was placed to measure intravesical pressures.  A 10 Fr balloon manometer was placed into the rectum for abdominal pressure measurements.  Patch EMG electrodes were placed on the perineum.  The patient was connected to the [employer] Urodynamic machine, using a multichannel technique, the data were interpreted.  The bladder was filled with sterile water at room temperature at a rate of 60 ml/min.  Patient is filled to 324.  Filling is performed with the patient in the seated position.  Abdominal leak point pressures are not checked.  The patient was then asked to void for a pressure flow study.    The following are the results of the study:  1.  Uroflow   Q max:  0.9   Voided volume: 2   Pattern of the curve: could  Not be assessed    2.  PVR by in and out cath: 300    3.  CMG   Sensation: delayed intially         First sensation: 180    First desire: 200    Strong desire:250         Capacity: 324   Abnormal Contractions: at 1st sensation and at 324 began to have rise in detrussor pressure with delayed sensation    Compliance: when he had a rise in pdet to 30s to 40s he was able to     4.  Abdominal Leak Point Pressure:  Did not check    5.  EMG:  normal    6.  Voiding phase   Qmax:  164.40   Average flow: 0.40   P det at Q max: 40   Pattern of the curve: delayed   Voided volume: 185     PVR by in and out cath: 91*    7.  Analysis:    He has delayed sensation with detrusor overactivity associated with leak/urge and empties mostly to completion. His dP  rises to 40 and up to 50 but this is associated with leak/void so he has a safe bladder. He leaks/voids mostly to completion with residuals of 70s to 90s.     8.  Recommendations/Plan:  See progress note

## 2019-07-11 ENCOUNTER — TELEPHONE (OUTPATIENT)
Dept: UROLOGY | Facility: CLINIC | Age: 74
End: 2019-07-11

## 2019-07-11 NOTE — TELEPHONE ENCOUNTER
----- Message from Yarelis Gilman sent at 7/11/2019 11:10 AM CDT -----  Contact: wife Isaura Stuart   Patient wife need to speak to nurse regarding patient need copy of all medical notes on patient per wife       Please call to advice [phone] (home) [phone] (work),patient wife requesting a call once ready please       Wife will like to  today if possible

## 2021-01-15 ENCOUNTER — IMMUNIZATION (OUTPATIENT)
Dept: FAMILY MEDICINE | Facility: CLINIC | Age: 76
End: 2021-01-15
Payer: MEDICARE

## 2021-01-15 DIAGNOSIS — Z23 NEED FOR VACCINATION: Primary | ICD-10-CM

## 2021-01-15 PROCEDURE — 0001A COVID-19, MRNA, LNP-S, PF, 30 MCG/0.3 ML DOSE VACCINE: CPT | Mod: CV19,,, | Performed by: FAMILY MEDICINE

## 2021-01-15 PROCEDURE — 91300 COVID-19, MRNA, LNP-S, PF, 30 MCG/0.3 ML DOSE VACCINE: ICD-10-PCS | Mod: ,,, | Performed by: FAMILY MEDICINE

## 2021-01-15 PROCEDURE — 0001A COVID-19, MRNA, LNP-S, PF, 30 MCG/0.3 ML DOSE VACCINE: ICD-10-PCS | Mod: CV19,,, | Performed by: FAMILY MEDICINE

## 2021-01-15 PROCEDURE — 91300 COVID-19, MRNA, LNP-S, PF, 30 MCG/0.3 ML DOSE VACCINE: CPT | Mod: ,,, | Performed by: FAMILY MEDICINE

## 2021-02-05 ENCOUNTER — IMMUNIZATION (OUTPATIENT)
Dept: FAMILY MEDICINE | Facility: CLINIC | Age: 76
End: 2021-02-05
Payer: MEDICARE

## 2021-02-05 DIAGNOSIS — Z23 NEED FOR VACCINATION: Primary | ICD-10-CM

## 2021-02-05 PROCEDURE — 0002A COVID-19, MRNA, LNP-S, PF, 30 MCG/0.3 ML DOSE VACCINE: ICD-10-PCS | Mod: CV19,,, | Performed by: FAMILY MEDICINE

## 2021-02-05 PROCEDURE — 91300 COVID-19, MRNA, LNP-S, PF, 30 MCG/0.3 ML DOSE VACCINE: CPT | Mod: ,,, | Performed by: FAMILY MEDICINE

## 2021-02-05 PROCEDURE — 91300 COVID-19, MRNA, LNP-S, PF, 30 MCG/0.3 ML DOSE VACCINE: ICD-10-PCS | Mod: ,,, | Performed by: FAMILY MEDICINE

## 2021-02-05 PROCEDURE — 0002A COVID-19, MRNA, LNP-S, PF, 30 MCG/0.3 ML DOSE VACCINE: CPT | Mod: CV19,,, | Performed by: FAMILY MEDICINE

## 2021-02-14 ENCOUNTER — OFFICE VISIT (OUTPATIENT)
Dept: URGENT CARE | Facility: CLINIC | Age: 76
End: 2021-02-14
Payer: MEDICARE

## 2021-02-14 VITALS
RESPIRATION RATE: 18 BRPM | DIASTOLIC BLOOD PRESSURE: 70 MMHG | HEIGHT: 72 IN | TEMPERATURE: 98 F | BODY MASS INDEX: 38.47 KG/M2 | OXYGEN SATURATION: 95 % | HEART RATE: 60 BPM | WEIGHT: 284 LBS | SYSTOLIC BLOOD PRESSURE: 180 MMHG

## 2021-02-14 DIAGNOSIS — I10 ESSENTIAL HYPERTENSION: ICD-10-CM

## 2021-02-14 DIAGNOSIS — R21 RASH AND NONSPECIFIC SKIN ERUPTION: Primary | ICD-10-CM

## 2021-02-14 PROCEDURE — 99214 OFFICE O/P EST MOD 30 MIN: CPT | Mod: 25,S$GLB,, | Performed by: NURSE PRACTITIONER

## 2021-02-14 PROCEDURE — 96372 THER/PROPH/DIAG INJ SC/IM: CPT | Mod: S$GLB,,, | Performed by: NURSE PRACTITIONER

## 2021-02-14 PROCEDURE — 99214 PR OFFICE/OUTPT VISIT, EST, LEVL IV, 30-39 MIN: ICD-10-PCS | Mod: 25,S$GLB,, | Performed by: NURSE PRACTITIONER

## 2021-02-14 PROCEDURE — 96372 PR INJECTION,THERAP/PROPH/DIAG2ST, IM OR SUBCUT: ICD-10-PCS | Mod: S$GLB,,, | Performed by: NURSE PRACTITIONER

## 2021-02-14 RX ORDER — BETAMETHASONE DIPROPIONATE 0.5 MG/G
CREAM TOPICAL 2 TIMES DAILY
Qty: 45 G | Refills: 0 | Status: SHIPPED | OUTPATIENT
Start: 2021-02-14

## 2021-02-14 RX ORDER — ASPIRIN 81 MG/1
81 TABLET ORAL
COMMUNITY
Start: 2020-09-02 | End: 2021-09-02

## 2021-02-14 RX ORDER — DEXAMETHASONE SODIUM PHOSPHATE 4 MG/ML
8 INJECTION, SOLUTION INTRA-ARTICULAR; INTRALESIONAL; INTRAMUSCULAR; INTRAVENOUS; SOFT TISSUE
Status: COMPLETED | OUTPATIENT
Start: 2021-02-14 | End: 2021-02-14

## 2021-02-14 RX ADMIN — DEXAMETHASONE SODIUM PHOSPHATE 8 MG: 4 INJECTION, SOLUTION INTRA-ARTICULAR; INTRALESIONAL; INTRAMUSCULAR; INTRAVENOUS; SOFT TISSUE at 10:02

## 2021-08-18 ENCOUNTER — IMMUNIZATION (OUTPATIENT)
Dept: PRIMARY CARE CLINIC | Facility: CLINIC | Age: 76
End: 2021-08-18
Payer: MEDICARE

## 2021-08-18 DIAGNOSIS — Z23 NEED FOR VACCINATION: Primary | ICD-10-CM

## 2021-08-18 PROCEDURE — 91300 COVID-19, MRNA, LNP-S, PF, 30 MCG/0.3 ML DOSE VACCINE: ICD-10-PCS | Mod: S$GLB,,, | Performed by: FAMILY MEDICINE

## 2021-08-18 PROCEDURE — 0003A COVID-19, MRNA, LNP-S, PF, 30 MCG/0.3 ML DOSE VACCINE: ICD-10-PCS | Mod: CV19,S$GLB,, | Performed by: FAMILY MEDICINE

## 2021-08-18 PROCEDURE — 91300 COVID-19, MRNA, LNP-S, PF, 30 MCG/0.3 ML DOSE VACCINE: CPT | Mod: S$GLB,,, | Performed by: FAMILY MEDICINE

## 2021-08-18 PROCEDURE — 0003A COVID-19, MRNA, LNP-S, PF, 30 MCG/0.3 ML DOSE VACCINE: CPT | Mod: CV19,S$GLB,, | Performed by: FAMILY MEDICINE

## 2023-09-17 NOTE — H&P
"[facility] Medicine  History & Physical    Patient Name: Jun Stuart  MRN: [id number]  Admission Date: 5/17/2018  Attending Physician: Erik Granados MD   Primary Care Provider: Juanjo Park MD         Patient information was obtained from patient, spouse/SO, past medical records and ER records.     Subjective:     Principal Problem:Acute hypoxemic respiratory failure    Chief Complaint:   Chief Complaint   Patient presents with    Altered Mental Status     confusion with fever and SOB; 3 drinks of Vodka        HPI: Jun Stuart is a 72 y.o. Male with PMHx significant for afib, CAD, GERD, HTN, DM and HLD.  He is admitted to the service of hospital medicine with severe sepsis and acute hypoxemic respiratory failure. He presented to the ED via EMS for further evaluation of confusion.  His wife stated that  this am the patient awoke, he began complaining of upper back pain. He was brought to [facility], where the wife states that patient had an "x-ray and EKG." She states that the patient was then given pain medication and discharged. The patient took the pain medication and then drank alcohol.  He became nausous and had 1 episode of emesis.  Wife stated he went to bed and when he tried to get up to go to the bathroom, he couldn't, so she called EMS.  She reported that he had been confused after the pain medication and alcohol.  She stated that he had chills while at [facility], but did not take his temperature.  She endorsed the patient has had and increase in cough that is nonproductive. EMS states that the patient had an CBG of 193 and was 91% on RA on arrival.    Past Medical History:   Diagnosis Date    Atrial fibrillation     Colon polyp     Coronary artery disease     Diabetes mellitus     GERD (gastroesophageal reflux disease)     Hyperlipidemia     Hypertension        Past Surgical History:   Procedure Laterality Date    cardioversion  2012    " "Subjective     Mini Howe is a 40 y.o. female who presents with Toe Injury (Left middle toe, \" I hit my middle toe on my bed this morning. Concerned about a brake.\" )            Toe Injury  This is a new problem. The current episode started yesterday (left second toe swelling and pain after stubbing it on bed last night). The problem occurs constantly. The problem has been unchanged. Associated symptoms include arthralgias (left second toe) and joint swelling. Pertinent negatives include no chills, fever, nausea, numbness, rash, vomiting or weakness. Exacerbated by: palpation, bearing weight. She has tried nothing for the symptoms.         History reviewed. No pertinent past medical history.    Past Surgical History:   Procedure Laterality Date    FOOT SURGERY Left        Family History   Problem Relation Age of Onset    No Known Problems Mother     Cancer Father 64        brain cancer    No Known Problems Brother     Breast Cancer Maternal Aunt 42    Diabetes Maternal Grandmother     Alzheimer's Disease Paternal Grandfather        Allergies:  Gluten meal      Medications, Allergies, and current problem list reviewed today in Epic      Review of Systems   Constitutional:  Negative for chills, fever and malaise/fatigue.   Gastrointestinal:  Negative for nausea and vomiting.   Musculoskeletal:  Positive for arthralgias (left second toe), joint pain (left second toe pain, swelling) and joint swelling.   Skin:  Negative for rash.   Neurological:  Negative for tingling, sensory change, focal weakness, weakness and numbness.     All other systems reviewed and are negative.            Objective     /60 (BP Location: Left arm, Patient Position: Sitting, BP Cuff Size: Adult)   Pulse 74   Temp 36.6 °C (97.9 °F) (Temporal)   Resp 12   Ht 1.753 m (5' 9\")   Wt 76.4 kg (168 lb 6.4 oz)   LMP  (LMP Unknown)   SpO2 96%   Breastfeeding Yes   BMI 24.87 kg/m²      Physical Exam  Constitutional:       General: " JOINT REPLACEMENT      VASECTOMY         Review of patient's allergies indicates:  No Known Allergies    No current facility-administered medications on file prior to encounter.      Current Outpatient Prescriptions on File Prior to Encounter   Medication Sig    amiodarone (PACERONE) 200 MG Tab Take 200 mg by mouth once daily.     amlodipine (NORVASC) 10 MG tablet Take 10 mg by mouth once daily.     benazepril (LOTENSIN) 40 MG tablet Take 40 mg by mouth once daily.     metFORMIN (GLUCOPHAGE-XR) 500 MG 24 hr tablet Take 1,000 mg by mouth 2 (two) times daily with meals.     tamsulosin (FLOMAX) 0.4 mg Cp24 Take 0.4 mg by mouth once daily.      Family History     None        Social History Main Topics    Smoking status: Current Every Day Smoker     Packs/day: 1.00     Years: 50.00     Types: Cigarettes    Smokeless tobacco: Never Used    Alcohol use Yes    Drug use: No    Sexual activity: Not on file     Review of Systems   Constitutional: Positive for chills and fever. Negative for activity change, appetite change and fatigue.   HENT: Negative for congestion, hearing loss, sore throat and trouble swallowing.    Eyes: Negative for photophobia and visual disturbance.   Respiratory: Positive for cough. Negative for shortness of breath and wheezing.    Cardiovascular: Negative for chest pain, palpitations and leg swelling.   Gastrointestinal: Positive for nausea and vomiting. Negative for abdominal pain and diarrhea.   Endocrine: Negative for polydipsia, polyphagia and polyuria.   Genitourinary: Negative for difficulty urinating, dysuria, frequency and urgency.   Musculoskeletal: Positive for back pain. Negative for neck pain and neck stiffness.   Skin: Negative for rash and wound.   Neurological: Negative for dizziness, syncope, weakness, numbness and headaches.   Psychiatric/Behavioral: Positive for confusion. The patient is not nervous/anxious.      Objective:     Vital Signs (Most Recent):  Temp: 98.9 °F  She is not in acute distress.     Appearance: She is not ill-appearing.   HENT:      Head: Normocephalic and atraumatic.   Eyes:      Conjunctiva/sclera: Conjunctivae normal.   Cardiovascular:      Rate and Rhythm: Normal rate and regular rhythm.   Pulmonary:      Effort: Pulmonary effort is normal. No respiratory distress.      Breath sounds: Normal breath sounds. No wheezing, rhonchi or rales.   Musculoskeletal:        Feet:    Skin:     General: Skin is warm and dry.   Neurological:      General: No focal deficit present.      Mental Status: She is alert and oriented to person, place, and time.   Psychiatric:         Mood and Affect: Mood normal.         Behavior: Behavior normal.         Thought Content: Thought content normal.         Judgment: Judgment normal.           9/17/2023 3:53 PM     HISTORY/REASON FOR EXAM:  Pain in second digit of left foot after injury to second digit.        TECHNIQUE/EXAM DESCRIPTION AND NUMBER OF VIEWS:  3 views of the LEFT toes.     COMPARISON: None     FINDINGS:  Displaced and mildly impacted fracture near the distal end of the proximal phalanx of the second digit is identified. Soft tissue swelling is noted. No other fracture is identified.     IMPRESSION:     Fracture near the distal end of the proximal phalanx of the second digit is identified.                   Assessment & Plan        1. Toe injury, left, initial encounter  DX-TOE(S) 2+ LEFT    Referral to Podiatry      2. Closed fracture of phalanx of left second toe, initial encounter            - DX-TOE(S) 2+ LEFT; Future        X-ray reviewed. Agree with RAD above.   Short boot.   RICE  OTC analgesics.  Referral placed to follow-up with Podiatry.     Differential diagnoses, Supportive care, and indications for immediate follow-up discussed with patient.   Pathogenesis of diagnosis discussed including typical length and natural progression.   Instructed to return to clinic or nearest emergency department for any change  (37.2 °C) (05/17/18 2310)  Pulse: 60 (05/17/18 2310)  Resp: 18 (05/17/18 2310)  BP: 123/61 (05/17/18 2310)  SpO2: 95 % (05/17/18 2310) Vital Signs (24h Range):  Temp:  [98.8 °F (37.1 °C)-103.2 °F (39.6 °C)] 98.9 °F (37.2 °C)  Pulse:  [60-92] 60  Resp:  [18-24] 18  SpO2:  [92 %-96 %] 95 %  BP: (120-189)/(56-95) 123/61     Weight: 124.7 kg (275 lb)  Body mass index is 37.3 kg/m².    Physical Exam   Constitutional: He is oriented to person, place, and time. He appears well-developed and well-nourished. No distress.   Obese   HENT:   Head: Normocephalic and atraumatic.   Mouth/Throat: Oropharynx is clear and moist.   Eyes: Conjunctivae and EOM are normal. Pupils are equal, round, and reactive to light. No scleral icterus.   Neck: Normal range of motion. Neck supple. No JVD present. No tracheal deviation present. No thyromegaly present.   Cardiovascular: Normal rate, regular rhythm, normal heart sounds and intact distal pulses.  Exam reveals no gallop and no friction rub.    No murmur heard.  Pulses:       Dorsalis pedis pulses are 2+ on the right side, and 2+ on the left side.   Pulmonary/Chest: No accessory muscle usage. No respiratory distress. He has wheezes. He has rhonchi. He has no rales.   Abdominal: Soft. Bowel sounds are normal. He exhibits no distension and no mass. There is no tenderness. There is no guarding.   Pendulous   Musculoskeletal: Normal range of motion. He exhibits no edema, tenderness or deformity.   Neurological: He is alert and oriented to person, place, and time. He has normal strength. He exhibits normal muscle tone. Coordination normal.   Skin: Skin is warm and intact. Capillary refill takes less than 2 seconds. No rash noted. He is diaphoretic. No erythema.   Psychiatric: He has a normal mood and affect. His speech is normal and behavior is normal. Judgment and thought content normal.   Vitals reviewed.        CRANIAL NERVES     CN III, IV, VI   Pupils are equal, round, and reactive to  in condition, further concerns, or worsening of symptoms.      The patient demonstrated a good understanding and agreed with the treatment plan.      Abby Alvarado P.A.-C.             light.  Extraocular motions are normal.        Significant Labs:   ABGs:   Recent Labs  Lab 05/17/18 1941   PH 7.417   PCO2 38.8   HCO3 25.0   POCSATURATED 56*   BE 0     CBC:   Recent Labs  Lab 05/17/18 1941   WBC 7.80   HGB 12.2*   HCT 35.8*   *     CMP:   Recent Labs  Lab 05/17/18 1941   *   K 4.0   CL 97   CO2 23   *   BUN 16   CREATININE 1.4   CALCIUM 9.4   PROT 7.6   ALBUMIN 3.9   BILITOT 0.4   ALKPHOS 42*   AST 11   ALT 14   ANIONGAP 12   EGFRNONAA 51*     Lactic Acid:   Recent Labs  Lab 05/17/18 1941 05/17/18  2327   LACTATE 2.4* 2.1  2.1     Urine Studies:   Recent Labs  Lab 05/17/18 1920   COLORU Yellow   APPEARANCEUA Clear   PHUR 6.0   SPECGRAV 1.020   PROTEINUA 3+*   GLUCUA 3+*   KETONESU Negative   BILIRUBINUA Negative   OCCULTUA 2+*   NITRITE Negative   UROBILINOGEN Negative   LEUKOCYTESUR Negative   RBCUA 8*   WBCUA 4   BACTERIA Occasional   SQUAMEPITHEL 1   HYALINECASTS 0       Significant Imaging: I have reviewed all pertinent imaging results/findings within the past 24 hours.  CXR: IMPRESSION:  Cardiomegaly with mild vascular congestion. No focal infiltrate, pulmonary edema or pleural  Effusion.    Assessment/Plan:     * Acute hypoxemic respiratory failure  Community acquired pneumonia of left lower lobe of lung    IV Azithromycin and Rocephin  Bronchodilator treatments q4h  Antipyretics prn  Supplemental O2 via nasal cannula        Severe sepsis    This patient does have evidence of infective focus  My overall impression is severe sepsis.  Source - pneumonia  Antibiotics given-   Antibiotics     Start     Stop Route Frequency Ordered    05/18/18 2200  azithromycin 500 mg in dextrose 5 % 250 mL IVPB (ready to mix system)      05/19 0959 IV ED 1 Time 05/17/18 2248    05/18/18 2100  cefTRIAXone 2 gram/50 mL IVPB 2 g      05/19 0859 IV ED 1 Time 05/17/18 2248          Severe Sepsis only-  Latest labs reviewed, they are-    Recent Labs  Lab 05/17/18 1941   BILITOT 0.4        Recent Labs  Lab 05/17/18 1941   LACTATE 2.4*       Recent Labs  Lab 05/17/18 1941   PH 7.417   PO2 29*   PCO2 38.8   HCO3 25.0   BE 0       Organ dysfunction indicated by Encephalopathy and Acute respiratory failure and ZACHARY      Trend lactate until normalized.  Follow blood, urine and sputum cultures.  Check procalcitonin level.        Type 2 diabetes mellitus with hyperglycemia    Chronic problem.  Uncontrolled hyperglycemia upon admission labs.    Check blood glucose level q AC/HS.  Use Novolog Insulin Sliding Scale as needed.   Continue American Diabetic Association 1800 Kcal diet.                    ZACHARY (acute kidney injury)    Review of prior lab work at [facility] on 5/8/18 - Cr was 1.07, now 1.4 on admission.  Likely 2/2 to sepsis.  Aggressive IVF hydration.  Follow renal panel and electrolytes closely.  Adjust renal dose medications for Estimated Creatinine Clearance: 66.9 mL/min (based on SCr of 1.4 mg/dL).  Avoid nonessential nephrotoxic agents.                    Tobacco abuse    Dangers of cigarette smoking were reviewed with patient in detail for 3 minutes and patient was encouraged to quit. Nicotine replacement options were discussed - option to use Nicoderm.          Alcohol use    Reportedly drinks etoh daily without history of DT's  Nursing to perform CIWA assessment, monitoring closesly for DT's (tachycardia, diaphoresis, hypertension, and aggitation)  Will utilize multivitamins  Educated on the deleterious effects of alcohol use  Seizure precautions with prn ativan   Fall precautions        HTN (hypertension)    Chronic problem.  Labile.  Continue home antihypertensive regimen and monitor b/p closely.  Adjust medications as necessary.          CAD (coronary artery disease)    Historical diagnosis with stent placement.  Did not present with s/s of ACS.  Will continue home BB, ASA, and Statin therapy.  Cardiac monitoring  Monitor closely for s/s of ACS.          Paroxysmal atrial fibrillation     S/P cardioversion.  Has been in NSR since.  Not on anticoagulant therapy.  Continue home BB, Amiodarone.  Cardiac monitoring.            VTE Risk Mitigation         Ordered     enoxaparin injection 40 mg  Daily      05/17/18 2248     IP VTE LOW RISK PATIENT  Once      05/17/18 2248             Aretha Farmer NP  Department of Hospital Medicine   [facility]

## 2024-12-13 NOTE — TELEPHONE ENCOUNTER
Please let pt know the urodynamics machine has been fixed. We can reschedule his urodynamics on a Monday.   -schedule him for urodynamics and a urine sample 1 week prior      Let them know that his bladder scan showed he still had 300 left in his bladder after urinating. Ask him to void to completion (urinate as much as he can until he feels empty) and then immediately put a catheter in and record the amount that is left.      Ask him to call us with this number. If this is greater than 300 he will need earlier follow-up. He should continue flomax.          (4) rarely moist